# Patient Record
Sex: FEMALE | Race: WHITE | NOT HISPANIC OR LATINO | Employment: UNEMPLOYED | URBAN - METROPOLITAN AREA
[De-identification: names, ages, dates, MRNs, and addresses within clinical notes are randomized per-mention and may not be internally consistent; named-entity substitution may affect disease eponyms.]

---

## 2023-03-09 LAB
ALANINE AMINOTRANSFERASE (SGPT) (U/L) IN SER/PLAS: 6 U/L (ref 7–45)
ALBUMIN (G/DL) IN SER/PLAS: 3.2 G/DL (ref 3.4–5)
ALKALINE PHOSPHATASE (U/L) IN SER/PLAS: 89 U/L (ref 33–110)
AMYLASE (U/L) IN SER/PLAS: 34 U/L (ref 29–103)
ASPARTATE AMINOTRANSFERASE (SGOT) (U/L) IN SER/PLAS: 9 U/L (ref 9–39)
BILIRUBIN DIRECT (MG/DL) IN SER/PLAS: 0 MG/DL (ref 0–0.3)
BILIRUBIN TOTAL (MG/DL) IN SER/PLAS: 0.2 MG/DL (ref 0–1.2)
LIPASE (U/L) IN SER/PLAS: 26 U/L (ref 9–82)
PROTEIN TOTAL: 5.3 G/DL (ref 6.4–8.2)

## 2023-04-14 ENCOUNTER — OFFICE VISIT (OUTPATIENT)
Dept: PRIMARY CARE | Facility: CLINIC | Age: 53
End: 2023-04-14
Payer: MEDICAID

## 2023-04-14 VITALS
DIASTOLIC BLOOD PRESSURE: 86 MMHG | BODY MASS INDEX: 33.77 KG/M2 | SYSTOLIC BLOOD PRESSURE: 130 MMHG | OXYGEN SATURATION: 99 % | WEIGHT: 172 LBS | HEIGHT: 60 IN | HEART RATE: 93 BPM

## 2023-04-14 DIAGNOSIS — M54.16 LUMBAR RADICULOPATHY: ICD-10-CM

## 2023-04-14 DIAGNOSIS — Z12.31 ENCOUNTER FOR SCREENING MAMMOGRAM FOR MALIGNANT NEOPLASM OF BREAST: Primary | ICD-10-CM

## 2023-04-14 DIAGNOSIS — I10 HYPERTENSION, UNSPECIFIED TYPE: ICD-10-CM

## 2023-04-14 DIAGNOSIS — J30.9 ALLERGIC RHINITIS, UNSPECIFIED SEASONALITY, UNSPECIFIED TRIGGER: ICD-10-CM
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
## 2023-04-14 PROBLEM — G43.909 MIGRAINE, UNSPECIFIED, NOT INTRACTABLE, WITHOUT STATUS MIGRAINOSUS: Status: ACTIVE | Noted: 2023-04-14

## 2023-04-14 PROBLEM — M47.816 LUMBAR SPONDYLOSIS: Status: ACTIVE | Noted: 2023-04-14

## 2023-04-14 PROBLEM — R10.811 ABDOMINAL RIGHT UPPER QUADRANT TENDERNESS: Status: ACTIVE | Noted: 2023-04-14

## 2023-04-14 PROBLEM — R29.898 COMPLAINTS OF LEG WEAKNESS: Status: ACTIVE | Noted: 2023-04-14

## 2023-04-14 PROBLEM — M17.11 RIGHT KNEE DJD: Status: ACTIVE | Noted: 2023-04-14
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
## 2023-04-14 PROBLEM — G89.29 CHRONIC BACK PAIN: Status: ACTIVE | Noted: 2023-04-14

## 2023-04-14 PROBLEM — J30.2 SEASONAL ALLERGIC RHINITIS: Status: ACTIVE | Noted: 2023-04-14

## 2023-04-14 PROBLEM — L03.90 CELLULITIS: Status: ACTIVE | Noted: 2023-04-14

## 2023-04-14 PROBLEM — M17.12 LEFT KNEE DJD: Status: ACTIVE | Noted: 2023-04-14

## 2023-04-14 PROBLEM — F41.9 ANXIETY: Status: ACTIVE | Noted: 2023-04-14

## 2023-04-14 PROBLEM — R11.0 NAUSEA: Status: ACTIVE | Noted: 2023-04-14

## 2023-04-14 PROBLEM — G89.4 CHRONIC PAIN SYNDROME: Status: ACTIVE | Noted: 2023-04-14

## 2023-04-14 PROBLEM — E87.6 DIURETIC-INDUCED HYPOKALEMIA: Status: ACTIVE | Noted: 2023-04-14

## 2023-04-14 PROBLEM — R74.8 ELEVATED ALKALINE PHOSPHATASE LEVEL: Status: ACTIVE | Noted: 2023-04-14

## 2023-04-14 PROBLEM — G12.29: Status: ACTIVE | Noted: 2023-04-14

## 2023-04-14 PROBLEM — R60.0 LEG EDEMA: Status: ACTIVE | Noted: 2023-04-14

## 2023-04-14 PROBLEM — D68.61 ANTI-PHOSPHOLIPID SYNDROME (MULTI): Status: ACTIVE | Noted: 2023-04-14

## 2023-04-14 PROBLEM — M47.814 THORACIC SPONDYLOSIS: Status: ACTIVE | Noted: 2023-04-14

## 2023-04-14 PROBLEM — D50.9 MICROCYTIC ANEMIA: Status: ACTIVE | Noted: 2023-04-14

## 2023-04-14 PROBLEM — D64.9 ANEMIA: Status: ACTIVE | Noted: 2023-04-14

## 2023-04-14 PROBLEM — R10.10 PAIN OF UPPER ABDOMEN: Status: ACTIVE | Noted: 2023-04-14

## 2023-04-14 PROBLEM — R93.89 ABNORMAL MRI: Status: ACTIVE | Noted: 2023-04-14

## 2023-04-14 PROBLEM — I83.893 VARICOSE VEINS OF BILATERAL LOWER EXTREMITIES WITH OTHER COMPLICATIONS: Status: ACTIVE | Noted: 2023-04-14

## 2023-04-14 PROBLEM — S91.051A DOG BITE OF RIGHT ANKLE: Status: ACTIVE | Noted: 2023-04-14

## 2023-04-14 PROBLEM — G44.209 HEADACHE, TENSION-TYPE: Status: ACTIVE | Noted: 2023-04-14

## 2023-04-14 PROBLEM — M21.372 BILATERAL FOOT-DROP: Status: ACTIVE | Noted: 2023-04-14

## 2023-04-14 PROBLEM — N39.0 URINARY TRACT INFECTION: Status: ACTIVE | Noted: 2023-04-14

## 2023-04-14 PROBLEM — R16.0 HEPATOMEGALY: Status: ACTIVE | Noted: 2023-04-14

## 2023-04-14 PROBLEM — S60.00XA CONTUSION OF FINGER, LEFT: Status: ACTIVE | Noted: 2023-04-14

## 2023-04-14 PROBLEM — M54.9 CHRONIC BACK PAIN: Status: ACTIVE | Noted: 2023-04-14

## 2023-04-14 PROBLEM — E66.9 OBESITY: Status: ACTIVE | Noted: 2023-04-14

## 2023-04-14 PROBLEM — R30.0 DYSURIA: Status: ACTIVE | Noted: 2023-04-14

## 2023-04-14 PROBLEM — M21.371 BILATERAL FOOT-DROP: Status: ACTIVE | Noted: 2023-04-14

## 2023-04-14 PROBLEM — E78.5 HYPERLIPIDEMIA: Status: ACTIVE | Noted: 2023-04-14

## 2023-04-14 PROBLEM — R05.9 COUGH: Status: ACTIVE | Noted: 2023-04-14

## 2023-04-14 PROBLEM — K29.70 GASTRITIS: Status: ACTIVE | Noted: 2023-04-14

## 2023-04-14 PROBLEM — G44.221 CHRONIC TENSION-TYPE HEADACHE, INTRACTABLE: Status: ACTIVE | Noted: 2023-04-14

## 2023-04-14 PROBLEM — R40.0 DAYTIME SOMNOLENCE: Status: ACTIVE | Noted: 2023-04-14

## 2023-04-14 PROBLEM — D75.839 THROMBOCYTOSIS: Status: ACTIVE | Noted: 2023-04-14

## 2023-04-14 PROBLEM — M79.18 MYOFASCIAL PAIN: Status: ACTIVE | Noted: 2023-04-14

## 2023-04-14 PROBLEM — E55.9 VITAMIN D DEFICIENCY: Status: ACTIVE | Noted: 2023-04-14

## 2023-04-14 PROBLEM — L08.9 INFECTED DOG BITE: Status: ACTIVE | Noted: 2023-04-14

## 2023-04-14 PROBLEM — M79.89 SWELLING OF BOTH LOWER EXTREMITIES: Status: ACTIVE | Noted: 2023-04-14

## 2023-04-14 PROBLEM — E79.0 ELEVATED URIC ACID IN BLOOD: Status: ACTIVE | Noted: 2023-04-14

## 2023-04-14 PROBLEM — M25.562 LEFT KNEE PAIN: Status: ACTIVE | Noted: 2023-04-14

## 2023-04-14 PROBLEM — K76.0 FATTY LIVER: Status: ACTIVE | Noted: 2023-04-14

## 2023-04-14 PROBLEM — K85.90 PANCREATITIS, ACUTE (HHS-HCC): Status: ACTIVE | Noted: 2023-04-14

## 2023-04-14 PROBLEM — N64.89 BREAST ASYMMETRY: Status: ACTIVE | Noted: 2023-04-14

## 2023-04-14 PROBLEM — R80.9 PROTEINURIA: Status: ACTIVE | Noted: 2023-04-14

## 2023-04-14 PROBLEM — E03.9 HYPOTHYROIDISM: Status: ACTIVE | Noted: 2023-04-14

## 2023-04-14 PROBLEM — R19.7 DIARRHEA: Status: ACTIVE | Noted: 2023-04-14

## 2023-04-14 PROBLEM — G62.9 NEUROPATHY: Status: ACTIVE | Noted: 2023-04-14

## 2023-04-14 PROBLEM — I82.90 THROMBOSIS: Status: ACTIVE | Noted: 2023-04-14

## 2023-04-14 PROBLEM — T14.8XXA HEMATOMA: Status: ACTIVE | Noted: 2023-04-14

## 2023-04-14 PROBLEM — S01.81XA FACIAL LACERATION: Status: ACTIVE | Noted: 2023-04-14

## 2023-04-14 PROBLEM — M25.562 BILATERAL KNEE PAIN: Status: ACTIVE | Noted: 2023-04-14

## 2023-04-14 PROBLEM — D80.1 HYPOGAMMAGLOBULINEMIA (MULTI): Status: ACTIVE | Noted: 2023-04-14

## 2023-04-14 PROBLEM — N05.2 MEMBRANOUS GLOMERULONEPHRITIS: Status: ACTIVE | Noted: 2023-04-14

## 2023-04-14 PROBLEM — M25.561 KNEE PAIN, RIGHT: Status: ACTIVE | Noted: 2023-04-14

## 2023-04-14 PROBLEM — M25.561 BILATERAL KNEE PAIN: Status: ACTIVE | Noted: 2023-04-14

## 2023-04-14 PROBLEM — K21.9 GERD (GASTROESOPHAGEAL REFLUX DISEASE): Status: ACTIVE | Noted: 2023-04-14

## 2023-04-14 PROBLEM — T50.2X5A DIURETIC-INDUCED HYPOKALEMIA: Status: ACTIVE | Noted: 2023-04-14

## 2023-04-14 PROBLEM — R73.09 INCREASED GLUCOSE LEVEL WITHOUT DIABETES MELLITUS: Status: ACTIVE | Noted: 2023-04-14

## 2023-04-14 PROBLEM — N39.0 ACUTE UTI: Status: ACTIVE | Noted: 2023-04-14

## 2023-04-14 PROBLEM — R23.3 EASY BRUISING: Status: ACTIVE | Noted: 2023-04-14

## 2023-04-14 PROBLEM — I89.0 LYMPHEDEMA: Status: ACTIVE | Noted: 2023-04-14

## 2023-04-14 PROBLEM — J45.909 ASTHMA (HHS-HCC): Status: ACTIVE | Noted: 2023-04-14

## 2023-04-14 PROBLEM — R68.81 EARLY SATIETY: Status: ACTIVE | Noted: 2023-04-14

## 2023-04-14 PROCEDURE — 3079F DIAST BP 80-89 MM HG: CPT | Performed by: INTERNAL MEDICINE

## 2023-04-14 PROCEDURE — 3075F SYST BP GE 130 - 139MM HG: CPT | Performed by: INTERNAL MEDICINE

## 2023-04-14 PROCEDURE — 99214 OFFICE O/P EST MOD 30 MIN: CPT | Performed by: INTERNAL MEDICINE

## 2023-04-14 PROCEDURE — 1036F TOBACCO NON-USER: CPT | Performed by: INTERNAL MEDICINE

## 2023-04-14 RX ORDER — POTASSIUM CHLORIDE 20 MEQ/1
TABLET, EXTENDED RELEASE ORAL
COMMUNITY
End: 2023-05-01

## 2023-04-14 RX ORDER — ALBUTEROL SULFATE 90 UG/1
AEROSOL, METERED RESPIRATORY (INHALATION) EVERY 6 HOURS
COMMUNITY
End: 2023-08-17 | Stop reason: SDUPTHER

## 2023-04-14 RX ORDER — IMMUNE GLOBULIN INFUSION (HUMAN) 100 MG/ML
INJECTION, SOLUTION INTRAVENOUS; SUBCUTANEOUS
COMMUNITY
Start: 2022-12-30 | End: 2023-10-18 | Stop reason: ENTERED-IN-ERROR

## 2023-04-14 RX ORDER — TOPIRAMATE 100 MG/1
100 TABLET, FILM COATED ORAL 2 TIMES DAILY
COMMUNITY
End: 2023-06-15 | Stop reason: SDUPTHER

## 2023-04-14 RX ORDER — CHOLECALCIFEROL (VITAMIN D3) 25 MCG
1 TABLET ORAL DAILY
COMMUNITY
Start: 2021-04-19 | End: 2023-12-15 | Stop reason: SDUPTHER

## 2023-04-14 RX ORDER — HYDROCHLOROTHIAZIDE 25 MG/1
1 TABLET ORAL DAILY
COMMUNITY
Start: 2021-05-14

## 2023-04-14 RX ORDER — FLUTICASONE PROPIONATE 50 MCG
1 SPRAY, SUSPENSION (ML) NASAL DAILY
Qty: 16 G | Refills: 5 | Status: SHIPPED | OUTPATIENT
Start: 2023-04-14 | End: 2024-04-13

## 2023-04-14 RX ORDER — BUDESONIDE AND FORMOTEROL FUMARATE DIHYDRATE 160; 4.5 UG/1; UG/1
2 AEROSOL RESPIRATORY (INHALATION) 2 TIMES DAILY
COMMUNITY
Start: 2018-10-26 | End: 2023-05-01

## 2023-04-14 RX ORDER — SIMVASTATIN 40 MG/1
TABLET, FILM COATED ORAL
COMMUNITY
End: 2023-10-19 | Stop reason: ALTCHOICE

## 2023-04-14 RX ORDER — VITAMIN E MIXED 400 UNIT
CAPSULE ORAL
COMMUNITY
Start: 2023-01-27 | End: 2023-06-15 | Stop reason: SDUPTHER

## 2023-04-14 RX ORDER — ONDANSETRON 4 MG/1
TABLET, ORALLY DISINTEGRATING ORAL
COMMUNITY
Start: 2021-07-16

## 2023-04-14 RX ORDER — TOPIRAMATE 25 MG/1
TABLET ORAL
COMMUNITY
Start: 2016-02-26 | End: 2023-06-15 | Stop reason: SDUPTHER

## 2023-04-14 RX ORDER — FOLIC ACID 1 MG/1
1 TABLET ORAL DAILY
COMMUNITY
Start: 2021-11-26

## 2023-04-14 RX ORDER — METFORMIN HYDROCHLORIDE 500 MG/1
TABLET ORAL
COMMUNITY
Start: 2022-10-28 | End: 2023-08-17 | Stop reason: SDUPTHER

## 2023-04-14 RX ORDER — LEVOTHYROXINE SODIUM 25 UG/1
25 TABLET ORAL DAILY
COMMUNITY

## 2023-04-14 RX ORDER — DARBEPOETIN ALFA 10 UG/.4ML
10 INJECTION, SOLUTION INTRAVENOUS; SUBCUTANEOUS ONCE
COMMUNITY

## 2023-04-14 RX ORDER — MELATONIN 3 MG
CAPSULE ORAL
COMMUNITY
Start: 2021-11-11

## 2023-04-14 RX ORDER — GABAPENTIN 100 MG/1
100 CAPSULE ORAL NIGHTLY
COMMUNITY
End: 2023-05-01

## 2023-04-14 RX ORDER — WARFARIN SODIUM 2.5 MG/1
5 TABLET ORAL NIGHTLY
COMMUNITY
Start: 2021-11-11

## 2023-04-14 RX ORDER — LISINOPRIL 10 MG/1
10 TABLET ORAL DAILY
COMMUNITY
End: 2023-06-15 | Stop reason: SDUPTHER

## 2023-04-14 RX ORDER — HYDROCODONE BITARTRATE AND ACETAMINOPHEN 5; 325 MG/1; MG/1
TABLET ORAL
COMMUNITY
Start: 2022-04-28 | End: 2023-04-14 | Stop reason: SDUPTHER

## 2023-04-14 RX ORDER — PANTOPRAZOLE SODIUM 40 MG/1
TABLET, DELAYED RELEASE ORAL
COMMUNITY
Start: 2021-11-23

## 2023-04-14 RX ORDER — FUROSEMIDE 40 MG/1
1 TABLET ORAL DAILY
COMMUNITY
Start: 2021-08-30 | End: 2023-06-15 | Stop reason: SDUPTHER

## 2023-04-14 RX ORDER — FERROUS SULFATE 325(65) MG
1 TABLET ORAL DAILY
COMMUNITY
Start: 2016-04-28

## 2023-04-14 RX ORDER — AMITRIPTYLINE HYDROCHLORIDE 50 MG/1
TABLET, FILM COATED ORAL
COMMUNITY
Start: 2014-09-25 | End: 2023-06-15 | Stop reason: SDUPTHER

## 2023-04-14 RX ORDER — HYDROCODONE BITARTRATE AND ACETAMINOPHEN 5; 325 MG/1; MG/1
1 TABLET ORAL EVERY EVENING
Qty: 60 TABLET | Refills: 0 | Status: SHIPPED | OUTPATIENT
Start: 2023-04-14 | End: 2023-06-15 | Stop reason: SDUPTHER

## 2023-04-14 RX ORDER — PRAVASTATIN SODIUM 20 MG/1
1 TABLET ORAL NIGHTLY
COMMUNITY
Start: 2022-06-07 | End: 2023-06-15 | Stop reason: SDUPTHER

## 2023-04-14 RX ORDER — ALLOPURINOL 100 MG/1
1 TABLET ORAL DAILY
COMMUNITY
Start: 2020-01-15 | End: 2023-05-22

## 2023-04-14 NOTE — PROGRESS NOTES
Subjective   Patient ID: Janene Pinedo is a 53 y.o. female who presents for Follow-up and Med Refill.  HPIChief complaint       Glucose ranging 1 29-1 30 overall feels well no issues 6 pounds of intentional weight loss    Vitals:    04/14/23 1306   BP: 130/86   Pulse: 93   SpO2: 99%                 Health Maintenance:      Colonoscopy:      Mammogram:      Pelvic/Pap:      Low dose chest CT:      Aorta duplex:      Optho:      Podiatry:        Vaccines:      Prevnar 20:      Prevnar 13:      Pneumovax 23:      Tdap:      Shingrix:      COVID:      Influenza:        ROS:      General: 6 pounds intentional weight loss denies fever/chills/weight loss      Head: denies HA/trauma/masses/dizziness      Eyes: denies vision change/loss of vision/blurry vision/diplopia/eye pain      Ears: denies hearing loss/tinnitus/otalgia/otorrhea      Nose: denies nasal drainage/anosmia      Throat: denies dysphagia/odynophagia      Lymphatics: denies lymph node swelling      Cardiac: denies CP/palpitations/orthopnea/PND      Pulmonary: denies dyspnea/cough/wheezing      GI: denies abd pain/n/v/diarrhea/melena/hematochezia/hematemesis      : denies dysuria/hematuria/change frequency      Genital: denies genital discharge/lesions      Skin: denies rashes/lesions/masses      MSK: denies weakness/swelling/edema/gait imbalance/pain      Neuro: denies paresthesias/seizures/dysarthria      Psych: denies depression/anxiety/suicidal or homicidal ideations            Objective   Pulse 93   Ht 1.524 m (5')   Wt 78 kg (172 lb)   SpO2 99%   BMI 33.59 kg/m²      Physical Exam:     General: AO3, NAD     Head: atraumatic/NC     Eyes: EOMI/PERRLA. Negative APD     Ears: TM pearly gray, EAC clear. No lesions or erythema     Nose: symmetric nares, no discharge     Throat: trachea midline, uvula midline pink mucosa. No thyromegaly     Lymphatics: no cervical/supraclavicular/ant or posterior cervical adenopathy/axillary/inguinal adenopathy      Breast: not examined     Chest: no deformity or tenderness to palpation     Pulm: CTA b/l, no wheeze/rhonchi/rales. nonlabored     Cardiac: RRR +s1s2, no m/r/g.      GI: soft, NT/ND. Normoactive Bsx4. No rebound/guarding.     Rectal: no examined     MSK: 5/5 strength UE LE. No edema/clubbing/cyanosis     Skin: no rashes/lesions     Vascular: 2+ palp DP PT radials b/l. Negative carotid bruit     Neuro: CNII-XII intact. No focal deficits. Reflexes 2/4 brachioradialis bicep tricep patellar achilles. Finger to nose intact.     Psych: appropriate mood/affect                    No results found for: BMPR1A, CBCDIF      Assessment/Plan   Diagnoses and all orders for this visit:  Encounter for screening mammogram for malignant neoplasm of breast  -     BI mammo bilateral screening tomosynthesis; Future  Lumbar radiculopathy  -     HYDROcodone-acetaminophen (Norco) 5-325 mg tablet; Take 1 tablet by mouth once daily in the evening.  Allergic rhinitis, unspecified seasonality, unspecified trigger  -     fluticasone (Flonase) 50 mcg/actuation nasal spray; Administer 1 spray into each nostril once daily. Shake gently. Before first use, prime pump. After use, clean tip and replace cap.  Hypertension, unspecified type    Screening blood work due October 2023    Thank you for making appointment today Janene    Please follow-up in 2 months       Abigail Valle MA

## 2023-04-18 ENCOUNTER — TELEPHONE (OUTPATIENT)
Dept: PRIMARY CARE | Facility: CLINIC | Age: 53
End: 2023-04-18
Payer: MEDICAID

## 2023-04-29 DIAGNOSIS — E87.6 HYPOKALEMIA: Primary | ICD-10-CM

## 2023-04-29 DIAGNOSIS — G62.9 NEUROPATHY: ICD-10-CM

## 2023-04-29 DIAGNOSIS — J98.01 BRONCHOSPASM: ICD-10-CM

## 2023-05-01 RX ORDER — GABAPENTIN 100 MG/1
CAPSULE ORAL
Qty: 30 CAPSULE | Refills: 0 | Status: SHIPPED | OUTPATIENT
Start: 2023-05-01 | End: 2023-05-30

## 2023-05-01 RX ORDER — POTASSIUM CHLORIDE 20 MEQ/1
TABLET, EXTENDED RELEASE ORAL
Qty: 60 TABLET | Refills: 0 | Status: SHIPPED | OUTPATIENT
Start: 2023-05-01 | End: 2023-06-15 | Stop reason: SDUPTHER

## 2023-05-01 RX ORDER — BUDESONIDE AND FORMOTEROL FUMARATE DIHYDRATE 160; 4.5 UG/1; UG/1
AEROSOL RESPIRATORY (INHALATION)
Qty: 10.2 G | Refills: 0 | Status: SHIPPED | OUTPATIENT
Start: 2023-05-01 | End: 2023-05-22

## 2023-05-05 LAB
ALBUMIN (G/DL) IN SER/PLAS: 3.1 G/DL (ref 3.4–5)
ALBUMIN (MG/L) IN URINE: >2250 MG/L
ALBUMIN/CREATININE (UG/MG) IN URINE: NORMAL UG/MG CRT (ref 0–30)
ANION GAP IN SER/PLAS: 12 MMOL/L (ref 10–20)
APPEARANCE, URINE: CLEAR
BACTERIA, URINE: ABNORMAL /HPF
BILIRUBIN, URINE: NEGATIVE
BLOOD, URINE: NEGATIVE
CALCIDIOL (25 OH VITAMIN D3) (NG/ML) IN SER/PLAS: 21 NG/ML
CALCIUM (MG/DL) IN SER/PLAS: 8.7 MG/DL (ref 8.6–10.3)
CARBON DIOXIDE, TOTAL (MMOL/L) IN SER/PLAS: 18 MMOL/L (ref 21–32)
CHLORIDE (MMOL/L) IN SER/PLAS: 116 MMOL/L (ref 98–107)
COLOR, URINE: ABNORMAL
CREATININE (MG/DL) IN SER/PLAS: 2.96 MG/DL (ref 0.5–1.05)
CREATININE (MG/DL) IN URINE: 38.8 MG/DL (ref 20–320)
CREATININE (MG/DL) IN URINE: 38.8 MG/DL (ref 20–320)
ERYTHROCYTE DISTRIBUTION WIDTH (RATIO) BY AUTOMATED COUNT: 14.5 % (ref 11.5–14.5)
ERYTHROCYTE MEAN CORPUSCULAR HEMOGLOBIN CONCENTRATION (G/DL) BY AUTOMATED: 29.3 G/DL (ref 32–36)
ERYTHROCYTE MEAN CORPUSCULAR VOLUME (FL) BY AUTOMATED COUNT: 97 FL (ref 80–100)
ERYTHROCYTES (10*6/UL) IN BLOOD BY AUTOMATED COUNT: 3.45 X10E12/L (ref 4–5.2)
GFR FEMALE: 18 ML/MIN/1.73M2
GLUCOSE (MG/DL) IN SER/PLAS: 135 MG/DL (ref 74–99)
GLUCOSE, URINE: ABNORMAL MG/DL
HEMATOCRIT (%) IN BLOOD BY AUTOMATED COUNT: 33.4 % (ref 36–46)
HEMOGLOBIN (G/DL) IN BLOOD: 9.8 G/DL (ref 12–16)
IGA (MG/DL) IN SER/PLAS: 112 MG/DL (ref 70–400)
IGG (MG/DL) IN SER/PLAS: 331 MG/DL (ref 700–1600)
IGM (MG/DL) IN SER/PLAS: 53 MG/DL (ref 40–230)
KETONES, URINE: NEGATIVE MG/DL
LEUKOCYTE ESTERASE, URINE: NEGATIVE
LEUKOCYTES (10*3/UL) IN BLOOD BY AUTOMATED COUNT: 8.1 X10E9/L (ref 4.4–11.3)
NITRITE, URINE: NEGATIVE
NRBC (PER 100 WBCS) BY AUTOMATED COUNT: 0 /100 WBC (ref 0–0)
PARATHYRIN INTACT (PG/ML) IN SER/PLAS: 18.6 PG/ML (ref 18.5–88)
PH, URINE: 6 (ref 5–8)
PHOSPHATE (MG/DL) IN SER/PLAS: 5.3 MG/DL (ref 2.5–4.9)
PLATELETS (10*3/UL) IN BLOOD AUTOMATED COUNT: 181 X10E9/L (ref 150–450)
POTASSIUM (MMOL/L) IN SER/PLAS: 4.5 MMOL/L (ref 3.5–5.3)
PROTEIN (MG/DL) IN URINE: 478 MG/DL (ref 5–24)
PROTEIN, URINE: ABNORMAL MG/DL
PROTEIN/CREATININE (MG/MG) IN URINE: 12.32 MG/MG CREAT (ref 0–0.17)
RBC, URINE: 2 /HPF (ref 0–5)
SODIUM (MMOL/L) IN SER/PLAS: 141 MMOL/L (ref 136–145)
SPECIFIC GRAVITY, URINE: 1.01 (ref 1–1.03)
SQUAMOUS EPITHELIAL CELLS, URINE: 2 /HPF
TRANSITIONAL EPITHELIAL CELLS, URINE: <1 /HPF
UREA NITROGEN (MG/DL) IN SER/PLAS: 36 MG/DL (ref 6–23)
UROBILINOGEN, URINE: <2 MG/DL (ref 0–1.9)
WBC, URINE: 6 /HPF (ref 0–5)

## 2023-05-09 LAB
PHOSPHOLIPASE A2 RECEPTOR,IGG TITER: ABNORMAL
PHOSPHOLIPASE A2 RECEPTOR,IGG: DETECTED

## 2023-05-13 DIAGNOSIS — J98.01 BRONCHOSPASM: ICD-10-CM

## 2023-05-13 DIAGNOSIS — E79.0 ELEVATED URIC ACID IN BLOOD: Primary | ICD-10-CM

## 2023-05-22 RX ORDER — ALLOPURINOL 100 MG/1
TABLET ORAL
Qty: 30 TABLET | Refills: 0 | Status: SHIPPED | OUTPATIENT
Start: 2023-05-22 | End: 2023-05-30

## 2023-05-22 RX ORDER — BUDESONIDE AND FORMOTEROL FUMARATE DIHYDRATE 160; 4.5 UG/1; UG/1
AEROSOL RESPIRATORY (INHALATION)
Qty: 10.2 G | Refills: 0 | Status: SHIPPED | OUTPATIENT
Start: 2023-05-22 | End: 2023-05-30

## 2023-05-27 DIAGNOSIS — E79.0 ELEVATED URIC ACID IN BLOOD: ICD-10-CM

## 2023-05-27 DIAGNOSIS — J98.01 BRONCHOSPASM: ICD-10-CM

## 2023-05-27 DIAGNOSIS — G62.9 NEUROPATHY: ICD-10-CM

## 2023-05-30 RX ORDER — GABAPENTIN 100 MG/1
CAPSULE ORAL
Qty: 30 CAPSULE | Refills: 0 | Status: SHIPPED | OUTPATIENT
Start: 2023-05-30 | End: 2023-06-15 | Stop reason: SDUPTHER

## 2023-05-30 RX ORDER — BUDESONIDE AND FORMOTEROL FUMARATE DIHYDRATE 160; 4.5 UG/1; UG/1
AEROSOL RESPIRATORY (INHALATION)
Qty: 10.2 G | Refills: 0 | Status: SHIPPED | OUTPATIENT
Start: 2023-05-30 | End: 2023-07-03

## 2023-05-30 RX ORDER — ALLOPURINOL 100 MG/1
TABLET ORAL
Qty: 30 TABLET | Refills: 0 | Status: SHIPPED | OUTPATIENT
Start: 2023-05-30 | End: 2023-07-03

## 2023-06-14 PROBLEM — M25.569 CHRONIC KNEE PAIN: Status: ACTIVE | Noted: 2023-04-14

## 2023-06-14 PROBLEM — N13.30 BILATERAL HYDRONEPHROSIS: Status: ACTIVE | Noted: 2023-06-14

## 2023-06-14 PROBLEM — N18.4 STAGE 4 CHRONIC KIDNEY DISEASE (MULTI): Status: ACTIVE | Noted: 2023-06-14

## 2023-06-14 PROBLEM — G89.29 CHRONIC KNEE PAIN: Status: ACTIVE | Noted: 2023-04-14

## 2023-06-14 PROBLEM — K68.3 RETROPERITONEAL HEMATOMA: Status: ACTIVE | Noted: 2023-06-14

## 2023-06-14 PROBLEM — N26.1 RENAL ATROPHY, LEFT: Status: ACTIVE | Noted: 2023-06-14

## 2023-06-14 RX ORDER — SODIUM BICARBONATE 650 MG/1
1 TABLET ORAL 2 TIMES DAILY
COMMUNITY
Start: 2023-05-17 | End: 2023-10-18 | Stop reason: SDUPTHER

## 2023-06-15 ENCOUNTER — OFFICE VISIT (OUTPATIENT)
Dept: PRIMARY CARE | Facility: CLINIC | Age: 53
End: 2023-06-15
Payer: MEDICAID

## 2023-06-15 VITALS
HEIGHT: 60 IN | SYSTOLIC BLOOD PRESSURE: 136 MMHG | DIASTOLIC BLOOD PRESSURE: 90 MMHG | HEART RATE: 85 BPM | BODY MASS INDEX: 34.75 KG/M2 | WEIGHT: 177 LBS | OXYGEN SATURATION: 95 %

## 2023-06-15 DIAGNOSIS — Z00.00 HEALTHCARE MAINTENANCE: ICD-10-CM

## 2023-06-15 DIAGNOSIS — R60.0 LEG EDEMA: ICD-10-CM

## 2023-06-15 DIAGNOSIS — G89.29 CHRONIC PAIN OF LEFT KNEE: ICD-10-CM

## 2023-06-15 DIAGNOSIS — G44.229 CHRONIC TENSION-TYPE HEADACHE, NOT INTRACTABLE: ICD-10-CM

## 2023-06-15 DIAGNOSIS — M54.16 LUMBAR RADICULOPATHY: ICD-10-CM

## 2023-06-15 DIAGNOSIS — E87.6 HYPOKALEMIA: ICD-10-CM

## 2023-06-15 DIAGNOSIS — M25.562 CHRONIC PAIN OF LEFT KNEE: ICD-10-CM

## 2023-06-15 DIAGNOSIS — I10 HYPERTENSION, UNSPECIFIED TYPE: ICD-10-CM

## 2023-06-15 DIAGNOSIS — G62.9 NEUROPATHY: ICD-10-CM

## 2023-06-15 DIAGNOSIS — N18.5 CKD (CHRONIC KIDNEY DISEASE), STAGE V (MULTI): Primary | ICD-10-CM

## 2023-06-15 DIAGNOSIS — E78.5 HYPERLIPIDEMIA, UNSPECIFIED HYPERLIPIDEMIA TYPE: ICD-10-CM

## 2023-06-15 PROCEDURE — 99214 OFFICE O/P EST MOD 30 MIN: CPT | Performed by: INTERNAL MEDICINE

## 2023-06-15 PROCEDURE — 3075F SYST BP GE 130 - 139MM HG: CPT | Performed by: INTERNAL MEDICINE

## 2023-06-15 PROCEDURE — 3080F DIAST BP >= 90 MM HG: CPT | Performed by: INTERNAL MEDICINE

## 2023-06-15 PROCEDURE — 1036F TOBACCO NON-USER: CPT | Performed by: INTERNAL MEDICINE

## 2023-06-15 RX ORDER — AMITRIPTYLINE HYDROCHLORIDE 50 MG/1
50 TABLET, FILM COATED ORAL NIGHTLY
Qty: 90 TABLET | Refills: 1 | Status: SHIPPED | OUTPATIENT
Start: 2023-06-15

## 2023-06-15 RX ORDER — POTASSIUM CHLORIDE 20 MEQ/1
TABLET, EXTENDED RELEASE ORAL
Qty: 180 TABLET | Refills: 1 | Status: SHIPPED | OUTPATIENT
Start: 2023-06-15

## 2023-06-15 RX ORDER — LISINOPRIL 10 MG/1
10 TABLET ORAL DAILY
Qty: 90 TABLET | Refills: 1 | Status: SHIPPED | OUTPATIENT
Start: 2023-06-15 | End: 2023-10-18 | Stop reason: ENTERED-IN-ERROR

## 2023-06-15 RX ORDER — VITAMIN E MIXED 400 UNIT
CAPSULE ORAL
Qty: 90 CAPSULE | Refills: 1 | Status: SHIPPED | OUTPATIENT
Start: 2023-06-15

## 2023-06-15 RX ORDER — GABAPENTIN 100 MG/1
100 CAPSULE ORAL NIGHTLY
Qty: 90 CAPSULE | Refills: 1 | Status: SHIPPED | OUTPATIENT
Start: 2023-06-15 | End: 2023-08-17 | Stop reason: SDUPTHER

## 2023-06-15 RX ORDER — PRAVASTATIN SODIUM 20 MG/1
20 TABLET ORAL NIGHTLY
Qty: 90 TABLET | Refills: 1 | Status: SHIPPED | OUTPATIENT
Start: 2023-06-15 | End: 2023-12-15 | Stop reason: ALTCHOICE

## 2023-06-15 RX ORDER — HYDROCODONE BITARTRATE AND ACETAMINOPHEN 5; 325 MG/1; MG/1
1 TABLET ORAL EVERY EVENING
Qty: 60 TABLET | Refills: 0 | Status: SHIPPED | OUTPATIENT
Start: 2023-06-15 | End: 2023-08-17 | Stop reason: SDUPTHER

## 2023-06-15 RX ORDER — TOPIRAMATE 25 MG/1
25 TABLET ORAL DAILY
Qty: 90 TABLET | Refills: 1 | Status: SHIPPED | OUTPATIENT
Start: 2023-06-15

## 2023-06-15 RX ORDER — FUROSEMIDE 40 MG/1
40 TABLET ORAL DAILY
Qty: 90 TABLET | Refills: 1 | Status: SHIPPED | OUTPATIENT
Start: 2023-06-15

## 2023-06-15 RX ORDER — TOPIRAMATE 100 MG/1
100 TABLET, FILM COATED ORAL 2 TIMES DAILY
Qty: 180 TABLET | Refills: 1 | Status: SHIPPED | OUTPATIENT
Start: 2023-06-15

## 2023-06-15 NOTE — PROGRESS NOTES
Subjective   Patient ID: Janene Pinedo is a 53 y.o. female who presents for Follow-up.  HPI          Patient presents for follow-up overall doing okay the main issue seems to be she making preparations to go into assisted living facility history due to her advancing CKD now in stage V she went to be by her father and family that live in the facility as well the facility does have in-house dialysis she is following with the kidney doctor next month      Health Maintenance:      Colonoscopy:      Mammogram: 2023      Pelvic/Pap:      Low dose chest CT:      Aorta duplex:      Optho:      Podiatry:        Vaccines:      Prevnar 20:      Prevnar 13:      Pneumovax 23:      Tdap:      Shingrix:      COVID:      Influenza:        ROS:      General: denies fever/chills/weight loss      Head: denies HA/trauma/masses/dizziness      Eyes: denies vision change/loss of vision/blurry vision/diplopia/eye pain      Ears: denies hearing loss/tinnitus/otalgia/otorrhea      Nose: denies nasal drainage/anosmia      Throat: denies dysphagia/odynophagia      Lymphatics: denies lymph node swelling      Cardiac: denies CP/palpitations/orthopnea/PND      Pulmonary: denies dyspnea/cough/wheezing      GI: denies abd pain/n/v/diarrhea/melena/hematochezia/hematemesis      : denies dysuria/hematuria/change frequency      Genital: denies genital discharge/lesions      Skin: denies rashes/lesions/masses      MSK: Had a fall the other week she was not using her walker the left knee gave out which it does recurrently over the years sometimes has achy pain no residual pain from the fall or trauma denies weakness/swelling/edema/gait imbalance/pain      Neuro: denies paresthesias/seizures/dysarthria      Psych: denies depression/anxiety/suicidal or homicidal ideations            Objective   /90   Pulse 85   Ht 1.524 m (5')   Wt 80.3 kg (177 lb)   SpO2 95%   BMI 34.57 kg/m²      Physical Exam:     General: AO3, NAD     Head:  "atraumatic/NC     Eyes: EOMI/PERRLA. Negative APD     Ears: TM pearly gray, EAC clear. No lesions or erythema     Nose: symmetric nares, no discharge     Throat: trachea midline, uvula midline pink mucosa. No thyromegaly     Lymphatics: no cervical/supraclavicular/ant or posterior cervical adenopathy/axillary/inguinal adenopathy     Breast: not examined     Chest: no deformity or tenderness to palpation     Pulm: CTA b/l, no wheeze/rhonchi/rales. nonlabored     Cardiac: RRR +s1s2, no m/r/g.      GI: soft, NT/ND. Normoactive Bsx4. No rebound/guarding.     Rectal: no examined     MSK: antalgic gait with walker5/5 strength UE LE. No edema/clubbing/cyanosis     Skin: Multiple tattoos no rashes/lesions     Vascular: 2+ palp DP PT radials b/l. Negative carotid bruit     Neuro: CNII-XII intact. No focal deficits. Reflexes 2/4 brachioradialis bicep tricep patellar achilles. Finger to nose intact.     Psych: appropriate mood/affect                    No results found for: \"BMPR1A\", \"CBCDIF\"      Assessment/Plan   Diagnoses and all orders for this visit:  CKD (chronic kidney disease), stage V (CMS/[facility])  Lumbar radiculopathy  -     HYDROcodone-acetaminophen (Norco) 5-325 mg tablet; Take 1 tablet by mouth once daily in the evening.  Neuropathy  -     gabapentin (Neurontin) 100 mg capsule; Take 1 capsule (100 mg) by mouth once daily at bedtime.  Hypokalemia  -     potassium chloride CR (Klor-Con M20) 20 mEq ER tablet; TAKE ONE TABLET BY MOUTH TWO TIMES A DAY or as directed  Chronic tension-type headache, not intractable  -     topiramate (Topamax) 100 mg tablet; Take 1 tablet (100 mg) by mouth 2 times a day.  -     topiramate (Topamax) 25 mg tablet; Take 1 tablet (25 mg) by mouth once daily.  -     amitriptyline (Elavil) 50 mg tablet; Take 1 tablet (50 mg) by mouth once daily at bedtime.  Healthcare maintenance  -     vitamin E 180 mg (400 unit) capsule; 1 po every day.  Hyperlipidemia, unspecified hyperlipidemia type  -     " pravastatin (Pravachol) 20 mg tablet; Take 1 tablet (20 mg) by mouth once daily at bedtime.  Hypertension, unspecified type  -     lisinopril 10 mg tablet; Take 1 tablet (10 mg) by mouth once daily.  Leg edema  -     furosemide (Lasix) 40 mg tablet; Take 1 tablet (40 mg) by mouth once daily.  Chronic pain of left knee  -     XR knee left 1-2 views; Future  As we discussed please call follow-up with orthopedics as ordered  Ensure you are safe walking use the walker at all times  Printed out last progress note to assist with your admission to assisted living     Screening blood work due October 2023    Thank you for making a point today Janene    Please follow-up in 2 months    Abigail Valle MA

## 2023-07-01 DIAGNOSIS — J98.01 BRONCHOSPASM: ICD-10-CM

## 2023-07-01 DIAGNOSIS — E79.0 ELEVATED URIC ACID IN BLOOD: ICD-10-CM

## 2023-07-03 RX ORDER — ALLOPURINOL 100 MG/1
TABLET ORAL
Qty: 30 TABLET | Refills: 0 | Status: SHIPPED | OUTPATIENT
Start: 2023-07-03 | End: 2023-08-01

## 2023-07-03 RX ORDER — BUDESONIDE AND FORMOTEROL FUMARATE DIHYDRATE 160; 4.5 UG/1; UG/1
AEROSOL RESPIRATORY (INHALATION)
Qty: 10.2 G | Refills: 0 | Status: SHIPPED | OUTPATIENT
Start: 2023-07-03 | End: 2023-08-01

## 2023-07-20 LAB
ALBUMIN (G/DL) IN SER/PLAS: 3.1 G/DL (ref 3.4–5)
ALBUMIN (MG/L) IN URINE: 1775.3 MG/L
ALBUMIN/CREATININE (UG/MG) IN URINE: 7820.7 UG/MG CRT (ref 0–30)
ANION GAP IN SER/PLAS: 11 MMOL/L (ref 10–20)
APPEARANCE, URINE: CLEAR
BACTERIA, URINE: ABNORMAL /HPF
BILIRUBIN, URINE: NEGATIVE
BLOOD, URINE: NEGATIVE
CALCIUM (MG/DL) IN SER/PLAS: 8.9 MG/DL (ref 8.6–10.3)
CARBON DIOXIDE, TOTAL (MMOL/L) IN SER/PLAS: 21 MMOL/L (ref 21–32)
CHLORIDE (MMOL/L) IN SER/PLAS: 115 MMOL/L (ref 98–107)
COLOR, URINE: ABNORMAL
CREATININE (MG/DL) IN SER/PLAS: 2.79 MG/DL (ref 0.5–1.05)
CREATININE (MG/DL) IN URINE: 22.7 MG/DL (ref 20–320)
CREATININE (MG/DL) IN URINE: 22.7 MG/DL (ref 20–320)
ERYTHROCYTE DISTRIBUTION WIDTH (RATIO) BY AUTOMATED COUNT: 15 % (ref 11.5–14.5)
ERYTHROCYTE MEAN CORPUSCULAR HEMOGLOBIN CONCENTRATION (G/DL) BY AUTOMATED: 30.1 G/DL (ref 32–36)
ERYTHROCYTE MEAN CORPUSCULAR VOLUME (FL) BY AUTOMATED COUNT: 96 FL (ref 80–100)
ERYTHROCYTES (10*6/UL) IN BLOOD BY AUTOMATED COUNT: 3.5 X10E12/L (ref 4–5.2)
GFR FEMALE: 20 ML/MIN/1.73M2
GLUCOSE (MG/DL) IN SER/PLAS: 92 MG/DL (ref 74–99)
GLUCOSE, URINE: ABNORMAL MG/DL
HEMATOCRIT (%) IN BLOOD BY AUTOMATED COUNT: 33.5 % (ref 36–46)
HEMOGLOBIN (G/DL) IN BLOOD: 10.1 G/DL (ref 12–16)
HYALINE CASTS, URINE: ABNORMAL /LPF
KETONES, URINE: NEGATIVE MG/DL
LEUKOCYTE ESTERASE, URINE: ABNORMAL
LEUKOCYTES (10*3/UL) IN BLOOD BY AUTOMATED COUNT: 7.7 X10E9/L (ref 4.4–11.3)
MUCUS, URINE: ABNORMAL /LPF
NITRITE, URINE: NEGATIVE
NRBC (PER 100 WBCS) BY AUTOMATED COUNT: 0 /100 WBC (ref 0–0)
PH, URINE: 7 (ref 5–8)
PHOSPHATE (MG/DL) IN SER/PLAS: 4.3 MG/DL (ref 2.5–4.9)
PLATELETS (10*3/UL) IN BLOOD AUTOMATED COUNT: 172 X10E9/L (ref 150–450)
POCT INTERNATIONAL NORMALIZATION RATIO: 1.5 (ref 0.9–1.1)
POTASSIUM (MMOL/L) IN SER/PLAS: 4.8 MMOL/L (ref 3.5–5.3)
PROTEIN (MG/DL) IN URINE: 318 MG/DL (ref 5–24)
PROTEIN, URINE: ABNORMAL MG/DL
PROTEIN/CREATININE (MG/MG) IN URINE: 14.01 MG/MG CREAT (ref 0–0.17)
RBC, URINE: <1 /HPF (ref 0–5)
SODIUM (MMOL/L) IN SER/PLAS: 142 MMOL/L (ref 136–145)
SPECIFIC GRAVITY, URINE: 1.01 (ref 1–1.03)
SQUAMOUS EPITHELIAL CELLS, URINE: 1 /HPF
TRANSITIONAL EPITHELIAL CELLS, URINE: <1 /HPF
UREA NITROGEN (MG/DL) IN SER/PLAS: 34 MG/DL (ref 6–23)
UROBILINOGEN, URINE: <2 MG/DL (ref 0–1.9)
WBC, URINE: 5 /HPF (ref 0–5)

## 2023-08-01 DIAGNOSIS — J98.01 BRONCHOSPASM: ICD-10-CM

## 2023-08-01 DIAGNOSIS — E79.0 ELEVATED URIC ACID IN BLOOD: ICD-10-CM

## 2023-08-01 RX ORDER — ALLOPURINOL 100 MG/1
TABLET ORAL
Qty: 30 TABLET | Refills: 0 | Status: SHIPPED | OUTPATIENT
Start: 2023-08-01

## 2023-08-01 RX ORDER — BUDESONIDE AND FORMOTEROL FUMARATE DIHYDRATE 160; 4.5 UG/1; UG/1
AEROSOL RESPIRATORY (INHALATION)
Qty: 10.2 G | Refills: 0 | Status: SHIPPED | OUTPATIENT
Start: 2023-08-01

## 2023-08-10 LAB
ALBUMIN (G/DL) IN SER/PLAS: 2.9 G/DL (ref 3.4–5)
ANION GAP IN SER/PLAS: 10 MMOL/L (ref 10–20)
CALCIUM (MG/DL) IN SER/PLAS: 8.4 MG/DL (ref 8.6–10.3)
CARBON DIOXIDE, TOTAL (MMOL/L) IN SER/PLAS: 22 MMOL/L (ref 21–32)
CHLORIDE (MMOL/L) IN SER/PLAS: 112 MMOL/L (ref 98–107)
CREATININE (MG/DL) IN SER/PLAS: 2.54 MG/DL (ref 0.5–1.05)
GFR FEMALE: 22 ML/MIN/1.73M2
GLUCOSE (MG/DL) IN SER/PLAS: 125 MG/DL (ref 74–99)
PHOSPHATE (MG/DL) IN SER/PLAS: 4.7 MG/DL (ref 2.5–4.9)
POTASSIUM (MMOL/L) IN SER/PLAS: 4.2 MMOL/L (ref 3.5–5.3)
SODIUM (MMOL/L) IN SER/PLAS: 140 MMOL/L (ref 136–145)
UREA NITROGEN (MG/DL) IN SER/PLAS: 28 MG/DL (ref 6–23)

## 2023-08-16 RX ORDER — LISINOPRIL 20 MG/1
20 TABLET ORAL DAILY
COMMUNITY
Start: 2023-07-26

## 2023-08-17 ENCOUNTER — LAB (OUTPATIENT)
Dept: LAB | Facility: LAB | Age: 53
End: 2023-08-17
Payer: MEDICAID

## 2023-08-17 ENCOUNTER — TELEPHONE (OUTPATIENT)
Dept: PRIMARY CARE | Facility: CLINIC | Age: 53
End: 2023-08-17

## 2023-08-17 ENCOUNTER — OFFICE VISIT (OUTPATIENT)
Dept: PRIMARY CARE | Facility: CLINIC | Age: 53
End: 2023-08-17
Payer: MEDICAID

## 2023-08-17 VITALS
DIASTOLIC BLOOD PRESSURE: 86 MMHG | HEART RATE: 98 BPM | OXYGEN SATURATION: 97 % | BODY MASS INDEX: 33.18 KG/M2 | WEIGHT: 169 LBS | HEIGHT: 60 IN | SYSTOLIC BLOOD PRESSURE: 126 MMHG

## 2023-08-17 DIAGNOSIS — J98.01 BRONCHOSPASM: Primary | ICD-10-CM

## 2023-08-17 DIAGNOSIS — M54.16 LUMBAR RADICULOPATHY: ICD-10-CM

## 2023-08-17 DIAGNOSIS — M17.12 PRIMARY OSTEOARTHRITIS OF LEFT KNEE: ICD-10-CM

## 2023-08-17 DIAGNOSIS — Z79.899 LONG TERM USE OF DRUG: ICD-10-CM

## 2023-08-17 DIAGNOSIS — G62.9 NEUROPATHY: ICD-10-CM

## 2023-08-17 DIAGNOSIS — E11.9 TYPE 2 DIABETES MELLITUS WITHOUT COMPLICATION, UNSPECIFIED WHETHER LONG TERM INSULIN USE (MULTI): ICD-10-CM

## 2023-08-17 LAB
ESTIMATED AVERAGE GLUCOSE FOR HBA1C: 80 MG/DL
HEMOGLOBIN A1C/HEMOGLOBIN TOTAL IN BLOOD: 4.4 %

## 2023-08-17 PROCEDURE — 99214 OFFICE O/P EST MOD 30 MIN: CPT | Performed by: INTERNAL MEDICINE

## 2023-08-17 PROCEDURE — 80354 DRUG SCREENING FENTANYL: CPT

## 2023-08-17 PROCEDURE — 80365 DRUG SCREENING OXYCODONE: CPT

## 2023-08-17 PROCEDURE — 36415 COLL VENOUS BLD VENIPUNCTURE: CPT

## 2023-08-17 PROCEDURE — 80373 DRUG SCREENING TRAMADOL: CPT

## 2023-08-17 PROCEDURE — 3079F DIAST BP 80-89 MM HG: CPT | Performed by: INTERNAL MEDICINE

## 2023-08-17 PROCEDURE — 83036 HEMOGLOBIN GLYCOSYLATED A1C: CPT

## 2023-08-17 PROCEDURE — 3074F SYST BP LT 130 MM HG: CPT | Performed by: INTERNAL MEDICINE

## 2023-08-17 PROCEDURE — 80346 BENZODIAZEPINES1-12: CPT

## 2023-08-17 PROCEDURE — 80358 DRUG SCREENING METHADONE: CPT

## 2023-08-17 PROCEDURE — 1036F TOBACCO NON-USER: CPT | Performed by: INTERNAL MEDICINE

## 2023-08-17 PROCEDURE — 80361 OPIATES 1 OR MORE: CPT

## 2023-08-17 PROCEDURE — 80368 SEDATIVE HYPNOTICS: CPT

## 2023-08-17 PROCEDURE — 80307 DRUG TEST PRSMV CHEM ANLYZR: CPT

## 2023-08-17 PROCEDURE — 4010F ACE/ARB THERAPY RXD/TAKEN: CPT | Performed by: INTERNAL MEDICINE

## 2023-08-17 RX ORDER — HYDROCODONE BITARTRATE AND ACETAMINOPHEN 5; 325 MG/1; MG/1
1 TABLET ORAL EVERY EVENING
Qty: 30 TABLET | Refills: 0 | Status: SHIPPED | OUTPATIENT
Start: 2023-08-17 | End: 2023-09-16

## 2023-08-17 RX ORDER — METFORMIN HYDROCHLORIDE 500 MG/1
500 TABLET ORAL
Qty: 90 TABLET | Refills: 1 | Status: SHIPPED | OUTPATIENT
Start: 2023-08-17 | End: 2023-10-20 | Stop reason: ALTCHOICE

## 2023-08-17 RX ORDER — GABAPENTIN 100 MG/1
100 CAPSULE ORAL NIGHTLY
Qty: 90 CAPSULE | Refills: 1 | Status: SHIPPED | OUTPATIENT
Start: 2023-08-17

## 2023-08-17 RX ORDER — ALBUTEROL SULFATE 90 UG/1
2 AEROSOL, METERED RESPIRATORY (INHALATION) EVERY 6 HOURS PRN
Qty: 18 G | Refills: 5 | Status: SHIPPED | OUTPATIENT
Start: 2023-08-17

## 2023-08-17 RX ORDER — HYDROCODONE BITARTRATE AND ACETAMINOPHEN 5; 325 MG/1; MG/1
1 TABLET ORAL EVERY EVENING
Qty: 60 TABLET | Refills: 0 | Status: SHIPPED | OUTPATIENT
Start: 2023-08-17 | End: 2023-08-17 | Stop reason: SDUPTHER

## 2023-08-17 NOTE — PROGRESS NOTES
Subjective   Patient ID: Janene Pinedo is a 53 y.o. female who presents for Follow-up and Med Refill.  HPI        Patient follows up she is having chronic gait instability from left knee giving out for months to years grade 6 out of 10 states she had cortisone injection in the past with orthopedic did not really help her stability  Occasional left knee arthralgia with it  Denies joint swelling fever chills paralysis paresthesias        Health Maintenance:      Colonoscopy:      Mammogram: 2023      Pelvic/Pap:      Low dose chest CT:      Aorta duplex:      Optho:      Podiatry:        Vaccines:      Prevnar 20:      Prevnar 13:      Pneumovax 23:      Tdap:      Shingrix:      COVID:      Influenza:        ROS:      General: denies fever/chills/weight loss      Head: denies HA/trauma/masses/dizziness      Eyes: denies vision change/loss of vision/blurry vision/diplopia/eye pain      Ears: denies hearing loss/tinnitus/otalgia/otorrhea      Nose: denies nasal drainage/anosmia      Throat: denies dysphagia/odynophagia      Lymphatics: denies lymph node swelling      Cardiac: denies CP/palpitations/orthopnea/PND      Pulmonary: denies dyspnea/cough/wheezing      GI: denies abd pain/n/v/diarrhea/melena/hematochezia/hematemesis      : denies dysuria/hematuria/change frequency      Genital: denies genital discharge/lesions      Skin: denies rashes/lesions/masses      MSK: Gait instability following left knee giving out ache denies weakness/swelling/edema/gait imbalance/pain      Neuro: denies paresthesias/seizures/dysarthria      Psych: denies depression/anxiety/suicidal or homicidal ideations            Objective   /90   Pulse 98   Ht 1.524 m (5')   Wt 76.7 kg (169 lb)   SpO2 97%   BMI 33.01 kg/m²      Physical Exam:     General: AO3, NAD     Head: atraumatic/NC     Eyes: EOMI/PERRLA. Negative APD     Ears: TM pearly gray, EAC clear. No lesions or erythema     Nose: symmetric nares, no discharge      "Throat: trachea midline, uvula midline pink mucosa. No thyromegaly     Lymphatics: no cervical/supraclavicular/ant or posterior cervical adenopathy/axillary/inguinal adenopathy     Breast: not examined     Chest: no deformity or tenderness to palpation     Pulm: CTA b/l, no wheeze/rhonchi/rales. nonlabored     Cardiac: RRR +s1s2, no m/r/g.      GI: soft, NT/ND. Normoactive Bsx4. No rebound/guarding.     Rectal: no examined     MSK: Crepitance bilateral patella left greater than right unsteady slow gait with walker 5/5 strength UE LE. No edema/clubbing/cyanosis     Skin: no rashes/lesions     Vascular: 2+ palp DP PT radials b/l. Negative carotid bruit     Neuro: CNII-XII intact. No focal deficits. Reflexes 2/4 brachioradialis bicep tricep patellar achilles. Finger to nose intact.     Psych: appropriate mood/affect                    No results found for: \"BMPR1A\", \"CBCDIF\"      Assessment/Plan   Diagnoses and all orders for this visit:  Bronchospasm  -     albuterol 90 mcg/actuation inhaler; Inhale 2 puffs every 6 hours if needed for wheezing.  Neuropathy  -     gabapentin (Neurontin) 100 mg capsule; Take 1 capsule (100 mg) by mouth once daily at bedtime.  -     Hemoglobin A1C; Future  Lumbar radiculopathy  -     HYDROcodone-acetaminophen (Norco) 5-325 mg tablet; Take 1 tablet by mouth once daily in the evening.  Long term use of drug  -     Opiate/Opioid/Benzo Extended Prescription Compliance; Future  Type 2 diabetes mellitus without complication, unspecified whether long term insulin use (CMS/[facility])  -     metFORMIN (Glucophage) 500 mg tablet; Take 1 tablet (500 mg) by mouth once daily with a meal.  Primary osteoarthritis of left knee  Comments:  With secondary gait instability    Letter written for recommendation for [facility] services skilled nursing as he stated he had trouble with ambulation cooking and managing her pills      Recommend completing x-ray of the left knee as ordered and calling and " following up with orthopedics    Follow-up with nephrology as planned    Screening blood work due October 2023    Thank you for making appointment today Janene    Please follow-up in 2 months       Abigail Valle MA

## 2023-08-17 NOTE — LETTER
August 17, 2023     Patient: Janene Pinedo   YOB: 1970   Date of Visit: 8/17/2023       To Whom It May Concern:    Janene Pinedo was seen in my clinic on 8/17/2023 at 11:15 am. I recommend patient for skilled rehab placement at [facility] for medical reasons, chronic back pain leg weakness, CKD IV advancing.    If you have any questions or concerns, please don't hesitate to call.         Sincerely,         Scott Ramos DO        CC: No Recipients

## 2023-08-23 LAB
6-ACETYLMORPHINE: <25 NG/ML
7-AMINOCLONAZEPAM: <25 NG/ML
ALPHA-HYDROXYALPRAZOLAM: <25 NG/ML
ALPHA-HYDROXYMIDAZOLAM: <25 NG/ML
ALPRAZOLAM: <25 NG/ML
AMPHETAMINE (PRESENCE) IN URINE BY SCREEN METHOD: ABNORMAL
BARBITURATES PRESENCE IN URINE BY SCREEN METHOD: ABNORMAL
CANNABINOIDS IN URINE BY SCREEN METHOD: ABNORMAL
CHLORDIAZEPOXIDE: <25 NG/ML
CLONAZEPAM: <25 NG/ML
COCAINE (PRESENCE) IN URINE BY SCREEN METHOD: ABNORMAL
CODEINE: <50 NG/ML
CREATINE, URINE FOR DRUG: 21 MG/DL
DIAZEPAM: <25 NG/ML
DRUG SCREEN COMMENT URINE: ABNORMAL
EDDP: <25 NG/ML
FENTANYL CONFIRMATION, URINE: <2.5 NG/ML
HYDROCODONE: <25 NG/ML
HYDROMORPHONE: 27 NG/ML
LORAZEPAM: <25 NG/ML
METHADONE CONFIRMATION,URINE: <25 NG/ML
MIDAZOLAM: <25 NG/ML
MORPHINE URINE: <50 NG/ML
NORDIAZEPAM: <25 NG/ML
NORFENTANYL: <2.5 NG/ML
NORHYDROCODONE: 42 NG/ML
NOROXYCODONE: <25 NG/ML
O-DESMETHYLTRAMADOL: <50 NG/ML
OXAZEPAM: <25 NG/ML
OXYCODONE: <25 NG/ML
OXYMORPHONE: <25 NG/ML
PHENCYCLIDINE (PRESENCE) IN URINE BY SCREEN METHOD: ABNORMAL
TEMAZEPAM: <25 NG/ML
TRAMADOL: <50 NG/ML
ZOLPIDEM METABOLITE (ZCA): <25 NG/ML
ZOLPIDEM: <25 NG/ML

## 2023-08-31 LAB
ALANINE AMINOTRANSFERASE (SGPT) (U/L) IN SER/PLAS: 7 U/L (ref 7–45)
ALBUMIN (G/DL) IN SER/PLAS: 2.3 G/DL (ref 3.4–5)
ALKALINE PHOSPHATASE (U/L) IN SER/PLAS: 80 U/L (ref 33–110)
ANION GAP IN SER/PLAS: 14 MMOL/L (ref 10–20)
ASPARTATE AMINOTRANSFERASE (SGOT) (U/L) IN SER/PLAS: 13 U/L (ref 9–39)
BASOPHILS (10*3/UL) IN BLOOD BY AUTOMATED COUNT: 0.02 X10E9/L (ref 0–0.1)
BASOPHILS/100 LEUKOCYTES IN BLOOD BY AUTOMATED COUNT: 0.2 % (ref 0–2)
BILIRUBIN TOTAL (MG/DL) IN SER/PLAS: 0.2 MG/DL (ref 0–1.2)
CALCIDIOL (25 OH VITAMIN D3) (NG/ML) IN SER/PLAS: 34 NG/ML
CALCIUM (MG/DL) IN SER/PLAS: 7.5 MG/DL (ref 8.6–10.3)
CARBON DIOXIDE, TOTAL (MMOL/L) IN SER/PLAS: 17 MMOL/L (ref 21–32)
CHLORIDE (MMOL/L) IN SER/PLAS: 115 MMOL/L (ref 98–107)
COBALAMIN (VITAMIN B12) (PG/ML) IN SER/PLAS: 1765 PG/ML (ref 211–911)
CREATININE (MG/DL) IN SER/PLAS: 2.65 MG/DL (ref 0.5–1.05)
EOSINOPHILS (10*3/UL) IN BLOOD BY AUTOMATED COUNT: 0.17 X10E9/L (ref 0–0.7)
EOSINOPHILS/100 LEUKOCYTES IN BLOOD BY AUTOMATED COUNT: 1.8 % (ref 0–6)
ERYTHROCYTE DISTRIBUTION WIDTH (RATIO) BY AUTOMATED COUNT: 15.7 % (ref 11.5–14.5)
ERYTHROCYTE MEAN CORPUSCULAR HEMOGLOBIN CONCENTRATION (G/DL) BY AUTOMATED: 30.7 G/DL (ref 32–36)
ERYTHROCYTE MEAN CORPUSCULAR VOLUME (FL) BY AUTOMATED COUNT: 98 FL (ref 80–100)
ERYTHROCYTES (10*6/UL) IN BLOOD BY AUTOMATED COUNT: 2.96 X10E12/L (ref 4–5.2)
FOLATE (NG/ML) IN SER/PLAS: 22.2 NG/ML
GFR FEMALE: 21 ML/MIN/1.73M2
GLUCOSE (MG/DL) IN SER/PLAS: 99 MG/DL (ref 74–99)
HEMATOCRIT (%) IN BLOOD BY AUTOMATED COUNT: 29 % (ref 36–46)
HEMOGLOBIN (G/DL) IN BLOOD: 8.9 G/DL (ref 12–16)
IMMATURE GRANULOCYTES/100 LEUKOCYTES IN BLOOD BY AUTOMATED COUNT: 1 % (ref 0–0.9)
INR IN PPP BY COAGULATION ASSAY: 4.7 (ref 0.9–1.1)
LEUKOCYTES (10*3/UL) IN BLOOD BY AUTOMATED COUNT: 9.7 X10E9/L (ref 4.4–11.3)
LYMPHOCYTES (10*3/UL) IN BLOOD BY AUTOMATED COUNT: 1.33 X10E9/L (ref 1.2–4.8)
LYMPHOCYTES/100 LEUKOCYTES IN BLOOD BY AUTOMATED COUNT: 13.8 % (ref 13–44)
MAGNESIUM (MG/DL) IN SER/PLAS: 2.01 MG/DL (ref 1.6–2.4)
MONOCYTES (10*3/UL) IN BLOOD BY AUTOMATED COUNT: 1 X10E9/L (ref 0.1–1)
MONOCYTES/100 LEUKOCYTES IN BLOOD BY AUTOMATED COUNT: 10.3 % (ref 2–10)
NEUTROPHILS (10*3/UL) IN BLOOD BY AUTOMATED COUNT: 7.05 X10E9/L (ref 1.2–7.7)
NEUTROPHILS/100 LEUKOCYTES IN BLOOD BY AUTOMATED COUNT: 72.9 % (ref 40–80)
NRBC (PER 100 WBCS) BY AUTOMATED COUNT: 0.2 /100 WBC (ref 0–0)
PLATELETS (10*3/UL) IN BLOOD AUTOMATED COUNT: 198 X10E9/L (ref 150–450)
POTASSIUM (MMOL/L) IN SER/PLAS: 4.1 MMOL/L (ref 3.5–5.3)
PROTEIN TOTAL: 4 G/DL (ref 6.4–8.2)
PROTHROMBIN TIME (PT) IN PPP BY COAGULATION ASSAY: 54.4 SEC (ref 9.8–12.8)
SODIUM (MMOL/L) IN SER/PLAS: 142 MMOL/L (ref 136–145)
THYROTROPIN (MIU/L) IN SER/PLAS BY DETECTION LIMIT <= 0.05 MIU/L: 0.47 MIU/L (ref 0.44–3.98)
THYROXINE (T4) (UG/DL) IN SER/PLAS: 6.8 UG/DL (ref 4.5–11.1)
UREA NITROGEN (MG/DL) IN SER/PLAS: 36 MG/DL (ref 6–23)

## 2023-09-03 LAB
INR IN PPP BY COAGULATION ASSAY: 1.7 (ref 0.9–1.1)
PROTHROMBIN TIME (PT) IN PPP BY COAGULATION ASSAY: 18.9 SEC (ref 9.8–12.8)

## 2023-09-05 LAB
INR IN PPP BY COAGULATION ASSAY: 1.1 (ref 0.9–1.1)
PROTHROMBIN TIME (PT) IN PPP BY COAGULATION ASSAY: 12.2 SEC (ref 9.8–12.8)

## 2023-09-07 LAB
INR IN PPP BY COAGULATION ASSAY: 1 (ref 0.9–1.1)
PROTHROMBIN TIME (PT) IN PPP BY COAGULATION ASSAY: 11.8 SEC (ref 9.8–12.8)

## 2023-09-09 LAB
INR IN PPP BY COAGULATION ASSAY: 1 (ref 0.9–1.1)
PROTHROMBIN TIME (PT) IN PPP BY COAGULATION ASSAY: 11.5 SEC (ref 9.8–12.8)

## 2023-09-11 ENCOUNTER — NURSING HOME VISIT (OUTPATIENT)
Dept: POST ACUTE CARE | Facility: EXTERNAL LOCATION | Age: 53
End: 2023-09-11
Payer: MEDICAID

## 2023-09-11 DIAGNOSIS — M25.569 CHRONIC KNEE PAIN, UNSPECIFIED LATERALITY: ICD-10-CM

## 2023-09-11 DIAGNOSIS — J45.40 MODERATE PERSISTENT ASTHMA, UNSPECIFIED WHETHER COMPLICATED (HHS-HCC): ICD-10-CM

## 2023-09-11 DIAGNOSIS — M21.371 BILATERAL FOOT-DROP: ICD-10-CM

## 2023-09-11 DIAGNOSIS — D68.61 ANTI-PHOSPHOLIPID SYNDROME (MULTI): Primary | ICD-10-CM

## 2023-09-11 DIAGNOSIS — G89.29 CHRONIC KNEE PAIN, UNSPECIFIED LATERALITY: ICD-10-CM

## 2023-09-11 DIAGNOSIS — M21.372 BILATERAL FOOT-DROP: ICD-10-CM

## 2023-09-11 DIAGNOSIS — D50.8 OTHER IRON DEFICIENCY ANEMIA: ICD-10-CM

## 2023-09-11 LAB
ACTIVATED PARTIAL THROMBOPLASTIN TIME IN PPP BY COAGULATION ASSAY: 35 SEC (ref 27–38)
ALANINE AMINOTRANSFERASE (SGPT) (U/L) IN SER/PLAS: 5 U/L (ref 7–45)
ALBUMIN (G/DL) IN SER/PLAS: 2.9 G/DL (ref 3.4–5)
ALKALINE PHOSPHATASE (U/L) IN SER/PLAS: 76 U/L (ref 33–110)
ANION GAP IN SER/PLAS: 10 MMOL/L (ref 10–20)
ASPARTATE AMINOTRANSFERASE (SGOT) (U/L) IN SER/PLAS: 7 U/L (ref 9–39)
BILIRUBIN TOTAL (MG/DL) IN SER/PLAS: 0.2 MG/DL (ref 0–1.2)
CALCIDIOL (25 OH VITAMIN D3) (NG/ML) IN SER/PLAS: 24 NG/ML
CALCIUM (MG/DL) IN SER/PLAS: 8.5 MG/DL (ref 8.6–10.3)
CARBON DIOXIDE, TOTAL (MMOL/L) IN SER/PLAS: 20 MMOL/L (ref 21–32)
CHLORIDE (MMOL/L) IN SER/PLAS: 118 MMOL/L (ref 98–107)
COBALAMIN (VITAMIN B12) (PG/ML) IN SER/PLAS: 461 PG/ML (ref 211–911)
CREATININE (MG/DL) IN SER/PLAS: 2.42 MG/DL (ref 0.5–1.05)
ERYTHROCYTE DISTRIBUTION WIDTH (RATIO) BY AUTOMATED COUNT: 14.7 % (ref 11.5–14.5)
ERYTHROCYTE MEAN CORPUSCULAR HEMOGLOBIN CONCENTRATION (G/DL) BY AUTOMATED: 29.1 G/DL (ref 32–36)
ERYTHROCYTE MEAN CORPUSCULAR VOLUME (FL) BY AUTOMATED COUNT: 98 FL (ref 80–100)
ERYTHROCYTES (10*6/UL) IN BLOOD BY AUTOMATED COUNT: 3.05 X10E12/L (ref 4–5.2)
ESTIMATED AVERAGE GLUCOSE FOR HBA1C: 97 MG/DL
FOLATE (NG/ML) IN SER/PLAS: 12.9 NG/ML
GFR FEMALE: 23 ML/MIN/1.73M2
GLUCOSE (MG/DL) IN SER/PLAS: 99 MG/DL (ref 74–99)
HEMATOCRIT (%) IN BLOOD BY AUTOMATED COUNT: 29.9 % (ref 36–46)
HEMOGLOBIN (G/DL) IN BLOOD: 8.7 G/DL (ref 12–16)
HEMOGLOBIN A1C/HEMOGLOBIN TOTAL IN BLOOD: 5 %
INR IN PPP BY COAGULATION ASSAY: 1 (ref 0.9–1.1)
LEUKOCYTES (10*3/UL) IN BLOOD BY AUTOMATED COUNT: 7.2 X10E9/L (ref 4.4–11.3)
NRBC (PER 100 WBCS) BY AUTOMATED COUNT: 0 /100 WBC (ref 0–0)
PLATELETS (10*3/UL) IN BLOOD AUTOMATED COUNT: 225 X10E9/L (ref 150–450)
POTASSIUM (MMOL/L) IN SER/PLAS: 4.2 MMOL/L (ref 3.5–5.3)
PROTEIN TOTAL: 4.6 G/DL (ref 6.4–8.2)
PROTHROMBIN TIME (PT) IN PPP BY COAGULATION ASSAY: 11.4 SEC (ref 9.8–12.8)
SODIUM (MMOL/L) IN SER/PLAS: 144 MMOL/L (ref 136–145)
THYROTROPIN (MIU/L) IN SER/PLAS BY DETECTION LIMIT <= 0.05 MIU/L: 0.93 MIU/L (ref 0.44–3.98)
UREA NITROGEN (MG/DL) IN SER/PLAS: 37 MG/DL (ref 6–23)

## 2023-09-11 PROCEDURE — 99308 SBSQ NF CARE LOW MDM 20: CPT | Performed by: FAMILY MEDICINE

## 2023-09-11 NOTE — PROGRESS NOTES
Subjective   Patient ID: Janene Pinedo is a 53 y.o. female who is long term resident being seen and evaluated for multiple medical problems.    Chronic knee djd, anti phospholipid syndrome. Foot drop, anemia, asthma.         Review of Systems   Reason unable to perform ROS: debility.       Objective   There were no vitals taken for this visit.    Physical Exam  Vitals and nursing note reviewed.   Constitutional:       Appearance: She is ill-appearing.   HENT:      Head: Normocephalic and atraumatic.      Nose: Nose normal.      Mouth/Throat:      Mouth: Mucous membranes are moist.      Pharynx: Oropharynx is clear.   Eyes:      Extraocular Movements: Extraocular movements intact.      Conjunctiva/sclera: Conjunctivae normal.      Pupils: Pupils are equal, round, and reactive to light.   Cardiovascular:      Rate and Rhythm: Normal rate and regular rhythm.      Pulses: Normal pulses.   Pulmonary:      Effort: Pulmonary effort is normal.      Breath sounds: Normal breath sounds.   Abdominal:      General: Bowel sounds are normal.      Palpations: Abdomen is soft.   Musculoskeletal:         General: Tenderness present. Normal range of motion.      Cervical back: Normal range of motion and neck supple.   Skin:     General: Skin is warm and dry.      Capillary Refill: Capillary refill takes less than 2 seconds.      Coloration: Skin is pale.   Neurological:      Mental Status: She is alert. Mental status is at baseline.      Comments: Bilateral foot drop         Assessment/Plan   Problem List Items Addressed This Visit       Anemia    Anti-phospholipid syndrome (CMS/HCC) - Primary    Asthma    Bilateral foot-drop    Chronic knee pain        Goals    None

## 2023-09-11 NOTE — LETTER
Patient: Janene Pinedo  : 1970    Encounter Date: 2023    Subjective  Patient ID: Janene Pinedo is a 53 y.o. female who is long term resident being seen and evaluated for multiple medical problems.    Chronic knee djd, anti phospholipid syndrome. Foot drop, anemia, asthma.         Review of Systems   Reason unable to perform ROS: debility.       Objective  There were no vitals taken for this visit.    Physical Exam  Vitals and nursing note reviewed.   Constitutional:       Appearance: She is ill-appearing.   HENT:      Head: Normocephalic and atraumatic.      Nose: Nose normal.      Mouth/Throat:      Mouth: Mucous membranes are moist.      Pharynx: Oropharynx is clear.   Eyes:      Extraocular Movements: Extraocular movements intact.      Conjunctiva/sclera: Conjunctivae normal.      Pupils: Pupils are equal, round, and reactive to light.   Cardiovascular:      Rate and Rhythm: Normal rate and regular rhythm.      Pulses: Normal pulses.   Pulmonary:      Effort: Pulmonary effort is normal.      Breath sounds: Normal breath sounds.   Abdominal:      General: Bowel sounds are normal.      Palpations: Abdomen is soft.   Musculoskeletal:         General: Tenderness present. Normal range of motion.      Cervical back: Normal range of motion and neck supple.   Skin:     General: Skin is warm and dry.      Capillary Refill: Capillary refill takes less than 2 seconds.      Coloration: Skin is pale.   Neurological:      Mental Status: She is alert. Mental status is at baseline.      Comments: Bilateral foot drop         Assessment/Plan  Problem List Items Addressed This Visit       Anemia    Anti-phospholipid syndrome (CMS/HCC) - Primary    Asthma    Bilateral foot-drop    Chronic knee pain        Goals    None           Electronically Signed By: Kyra Rivera MD   23  2:46 PM

## 2023-09-14 LAB
INR IN PPP BY COAGULATION ASSAY: 1.1 (ref 0.9–1.1)
PROTHROMBIN TIME (PT) IN PPP BY COAGULATION ASSAY: 12.6 SEC (ref 9.8–12.8)

## 2023-09-17 PROBLEM — N39.0 ACUTE UTI: Status: RESOLVED | Noted: 2023-04-14 | Resolved: 2023-09-17

## 2023-09-17 PROBLEM — L08.9 INFECTED DOG BITE: Status: RESOLVED | Noted: 2023-04-14 | Resolved: 2023-09-17

## 2023-09-17 PROBLEM — R11.0 NAUSEA: Status: RESOLVED | Noted: 2023-04-14 | Resolved: 2023-09-17
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
## 2023-09-17 PROBLEM — M25.561 KNEE PAIN, RIGHT: Status: RESOLVED | Noted: 2023-04-14 | Resolved: 2023-09-17

## 2023-09-17 PROBLEM — K85.90 PANCREATITIS, ACUTE (HHS-HCC): Status: RESOLVED | Noted: 2023-04-14 | Resolved: 2023-09-17

## 2023-09-17 PROBLEM — Z79.899 LONG TERM USE OF DRUG: Status: ACTIVE | Noted: 2023-09-17

## 2023-09-17 PROBLEM — M25.562 LEFT KNEE PAIN: Status: RESOLVED | Noted: 2023-04-14 | Resolved: 2023-09-17

## 2023-09-17 PROBLEM — Z79.01 ANTICOAGULATION ADEQUATE: Status: ACTIVE | Noted: 2023-09-17

## 2023-09-17 PROBLEM — R19.7 DIARRHEA: Status: RESOLVED | Noted: 2023-04-14 | Resolved: 2023-09-17

## 2023-09-17 PROBLEM — N39.0 URINARY TRACT INFECTION: Status: RESOLVED | Noted: 2023-04-14 | Resolved: 2023-09-17

## 2023-09-17 RX ORDER — LEG BRACE
EACH MISCELLANEOUS
COMMUNITY

## 2023-09-17 RX ORDER — LUTEIN 6 MG
TABLET ORAL
COMMUNITY
End: 2023-10-18 | Stop reason: SDUPTHER

## 2023-09-17 RX ORDER — ALBUTEROL SULFATE 0.83 MG/ML
2.5 SOLUTION RESPIRATORY (INHALATION) EVERY 6 HOURS PRN
COMMUNITY

## 2023-09-18 LAB
INR IN PPP BY COAGULATION ASSAY: 1.4 (ref 0.9–1.1)
PROTHROMBIN TIME (PT) IN PPP BY COAGULATION ASSAY: 16.2 SEC (ref 9.8–12.8)

## 2023-09-21 LAB
INR IN PPP BY COAGULATION ASSAY: 1.5 (ref 0.9–1.1)
PROTHROMBIN TIME (PT) IN PPP BY COAGULATION ASSAY: 16.5 SEC (ref 9.8–12.8)

## 2023-09-28 LAB
INR IN PPP BY COAGULATION ASSAY: 1.3 (ref 0.9–1.1)
PROTHROMBIN TIME (PT) IN PPP BY COAGULATION ASSAY: 14.1 SEC (ref 9.8–12.8)

## 2023-09-29 ENCOUNTER — NURSING HOME VISIT (OUTPATIENT)
Dept: POST ACUTE CARE | Facility: EXTERNAL LOCATION | Age: 53
End: 2023-09-29
Payer: MEDICAID

## 2023-09-29 DIAGNOSIS — J45.40 MODERATE PERSISTENT ASTHMA, UNSPECIFIED WHETHER COMPLICATED (HHS-HCC): ICD-10-CM

## 2023-09-29 DIAGNOSIS — D50.8 OTHER IRON DEFICIENCY ANEMIA: ICD-10-CM

## 2023-09-29 DIAGNOSIS — D68.61 ANTI-PHOSPHOLIPID SYNDROME (MULTI): Primary | ICD-10-CM

## 2023-09-29 DIAGNOSIS — M21.371 BILATERAL FOOT-DROP: ICD-10-CM

## 2023-09-29 DIAGNOSIS — M21.372 BILATERAL FOOT-DROP: ICD-10-CM

## 2023-09-29 PROCEDURE — 99308 SBSQ NF CARE LOW MDM 20: CPT | Performed by: FAMILY MEDICINE

## 2023-09-29 NOTE — PROGRESS NOTES
Subjective   Patient ID: Janene Pinedo is a 53 y.o. female who is long term resident being seen and evaluated for multiple medical problems.    Debility, foot drop bilateral, anemia, anti phospholipid syndrome, asthma         Review of Systems   Reason unable to perform ROS: debility.       Objective   There were no vitals taken for this visit.    Physical Exam  Vitals and nursing note reviewed.   Constitutional:       Appearance: She is ill-appearing.   HENT:      Head: Normocephalic and atraumatic.      Nose: Nose normal.      Mouth/Throat:      Mouth: Mucous membranes are moist.      Pharynx: Oropharynx is clear.   Eyes:      Extraocular Movements: Extraocular movements intact.      Conjunctiva/sclera: Conjunctivae normal.      Pupils: Pupils are equal, round, and reactive to light.   Cardiovascular:      Rate and Rhythm: Normal rate and regular rhythm.      Pulses: Normal pulses.   Pulmonary:      Effort: Pulmonary effort is normal.      Breath sounds: Wheezing present.   Abdominal:      General: Bowel sounds are normal.      Palpations: Abdomen is soft.   Musculoskeletal:         General: Tenderness present. Normal range of motion.      Cervical back: Normal range of motion and neck supple.   Skin:     General: Skin is warm and dry.      Capillary Refill: Capillary refill takes less than 2 seconds.      Coloration: Skin is pale.   Neurological:      Mental Status: She is alert. Mental status is at baseline.      Motor: Weakness present.      Gait: Gait abnormal.      Comments: Foot drop, bilateral   Psychiatric:         Mood and Affect: Mood normal. Flat affect: flat.         Speech: Speech normal.         Behavior: Behavior normal.         Thought Content: Thought content normal.         Judgment: Judgment normal.         Assessment/Plan   Problem List Items Addressed This Visit             ICD-10-CM    Anemia D64.9    Anti-phospholipid syndrome (CMS/HCC) - Primary D68.61    Asthma J45.909    Bilateral  foot-drop M21.371, M21.372        Goals    None

## 2023-09-29 NOTE — LETTER
Patient: Janene Pinedo  : 1970    Encounter Date: 2023    Subjective  Patient ID: Janene Pinedo is a 53 y.o. female who is long term resident being seen and evaluated for multiple medical problems.    Debility, foot drop bilateral, anemia, anti phospholipid syndrome, asthma         Review of Systems   Reason unable to perform ROS: debility.       Objective  There were no vitals taken for this visit.    Physical Exam  Vitals and nursing note reviewed.   Constitutional:       Appearance: She is ill-appearing.   HENT:      Head: Normocephalic and atraumatic.      Nose: Nose normal.      Mouth/Throat:      Mouth: Mucous membranes are moist.      Pharynx: Oropharynx is clear.   Eyes:      Extraocular Movements: Extraocular movements intact.      Conjunctiva/sclera: Conjunctivae normal.      Pupils: Pupils are equal, round, and reactive to light.   Cardiovascular:      Rate and Rhythm: Normal rate and regular rhythm.      Pulses: Normal pulses.   Pulmonary:      Effort: Pulmonary effort is normal.      Breath sounds: Wheezing present.   Abdominal:      General: Bowel sounds are normal.      Palpations: Abdomen is soft.   Musculoskeletal:         General: Tenderness present. Normal range of motion.      Cervical back: Normal range of motion and neck supple.   Skin:     General: Skin is warm and dry.      Capillary Refill: Capillary refill takes less than 2 seconds.      Coloration: Skin is pale.   Neurological:      Mental Status: She is alert. Mental status is at baseline.      Motor: Weakness present.      Gait: Gait abnormal.   Psychiatric:         Mood and Affect: Mood normal. Flat affect: flat.         Speech: Speech normal.         Behavior: Behavior normal.         Thought Content: Thought content normal.         Judgment: Judgment normal.         Assessment/Plan  Problem List Items Addressed This Visit             ICD-10-CM    Anemia D64.9    Anti-phospholipid syndrome (CMS/HCC) - Primary D68.61     Asthma J45.909    Bilateral foot-drop M21.371, M21.372        Goals    None           Electronically Signed By: Kyra Rivera MD   9/29/23  8:40 AM

## 2023-10-05 ENCOUNTER — LAB (OUTPATIENT)
Dept: LAB | Facility: LAB | Age: 53
End: 2023-10-05
Payer: MEDICAID

## 2023-10-05 DIAGNOSIS — N18.4 CHRONIC KIDNEY DISEASE, STAGE 4 (SEVERE) (MULTI): Primary | ICD-10-CM

## 2023-10-05 LAB
ALBUMIN SERPL BCP-MCNC: 3.4 G/DL (ref 3.4–5)
ANION GAP SERPL CALC-SCNC: 13 MMOL/L (ref 10–20)
APPEARANCE UR: CLEAR
BILIRUB UR STRIP.AUTO-MCNC: NEGATIVE MG/DL
BUN SERPL-MCNC: 30 MG/DL (ref 6–23)
CALCIUM SERPL-MCNC: 8.3 MG/DL (ref 8.6–10.3)
CHLORIDE SERPL-SCNC: 115 MMOL/L (ref 98–107)
CO2 SERPL-SCNC: 17 MMOL/L (ref 21–32)
COLOR UR: YELLOW
CREAT SERPL-MCNC: 2.26 MG/DL (ref 0.5–1.05)
CREAT UR-MCNC: 64.7 MG/DL (ref 20–320)
CREAT UR-MCNC: 64.7 MG/DL (ref 20–320)
ERYTHROCYTE [DISTWIDTH] IN BLOOD BY AUTOMATED COUNT: 15.1 % (ref 11.5–14.5)
GFR SERPL CREATININE-BSD FRML MDRD: 26 ML/MIN/1.73M*2
GLUCOSE SERPL-MCNC: 106 MG/DL (ref 74–99)
GLUCOSE UR STRIP.AUTO-MCNC: ABNORMAL MG/DL
HCT VFR BLD AUTO: 25.9 % (ref 36–46)
HGB BLD-MCNC: 7.7 G/DL (ref 12–16)
KETONES UR STRIP.AUTO-MCNC: NEGATIVE MG/DL
LEUKOCYTE ESTERASE UR QL STRIP.AUTO: ABNORMAL
MCH RBC QN AUTO: 29.6 PG (ref 26–34)
MCHC RBC AUTO-ENTMCNC: 29.7 G/DL (ref 32–36)
MCV RBC AUTO: 100 FL (ref 80–100)
MICROALBUMIN UR-MCNC: 1061.6 MG/L
MICROALBUMIN/CREAT UR: 1640.8 UG/MG CREAT
MUCOUS THREADS #/AREA URNS AUTO: ABNORMAL /LPF
NITRITE UR QL STRIP.AUTO: NEGATIVE
NRBC BLD-RTO: 0 /100 WBCS (ref 0–0)
PH UR STRIP.AUTO: 6 [PH]
PHOSPHATE SERPL-MCNC: 4 MG/DL (ref 2.5–4.9)
PLATELET # BLD AUTO: 197 X10*3/UL (ref 150–450)
PMV BLD AUTO: 11.4 FL (ref 7.5–11.5)
POTASSIUM SERPL-SCNC: 5 MMOL/L (ref 3.5–5.3)
PROT UR STRIP.AUTO-MCNC: ABNORMAL MG/DL
PROT UR-ACNC: 198 MG/DL (ref 5–24)
PROT/CREAT UR: 3.06 MG/MG CREAT (ref 0–0.17)
RBC # BLD AUTO: 2.6 X10*6/UL (ref 4–5.2)
RBC # UR STRIP.AUTO: NEGATIVE /UL
RBC #/AREA URNS AUTO: ABNORMAL /HPF
SODIUM SERPL-SCNC: 140 MMOL/L (ref 136–145)
SP GR UR STRIP.AUTO: 1.01
SQUAMOUS #/AREA URNS AUTO: ABNORMAL /HPF
UROBILINOGEN UR STRIP.AUTO-MCNC: <2 MG/DL
WBC # BLD AUTO: 7.1 X10*3/UL (ref 4.4–11.3)
WBC #/AREA URNS AUTO: ABNORMAL /HPF

## 2023-10-05 PROCEDURE — 85027 COMPLETE CBC AUTOMATED: CPT

## 2023-10-05 PROCEDURE — 82570 ASSAY OF URINE CREATININE: CPT

## 2023-10-05 PROCEDURE — 81001 URINALYSIS AUTO W/SCOPE: CPT

## 2023-10-05 PROCEDURE — 84156 ASSAY OF PROTEIN URINE: CPT

## 2023-10-05 PROCEDURE — 80069 RENAL FUNCTION PANEL: CPT

## 2023-10-05 PROCEDURE — 82043 UR ALBUMIN QUANTITATIVE: CPT

## 2023-10-05 PROCEDURE — 82306 VITAMIN D 25 HYDROXY: CPT

## 2023-10-05 PROCEDURE — 36415 COLL VENOUS BLD VENIPUNCTURE: CPT

## 2023-10-05 PROCEDURE — 83970 ASSAY OF PARATHORMONE: CPT

## 2023-10-06 LAB
25(OH)D3 SERPL-MCNC: 30 NG/ML (ref 30–100)
PTH-INTACT SERPL-MCNC: 62 PG/ML (ref 18.5–88)

## 2023-10-15 DIAGNOSIS — N18.4 CHRONIC KIDNEY DISEASE, STAGE 4 (SEVERE) (MULTI): Primary | ICD-10-CM

## 2023-10-15 DIAGNOSIS — D63.1 ANEMIA DUE TO STAGE 4 CHRONIC KIDNEY DISEASE (MULTI): ICD-10-CM

## 2023-10-15 DIAGNOSIS — N18.4 ANEMIA DUE TO STAGE 4 CHRONIC KIDNEY DISEASE (MULTI): ICD-10-CM

## 2023-10-15 RX ORDER — FAMOTIDINE 10 MG/ML
20 INJECTION INTRAVENOUS ONCE AS NEEDED
Status: CANCELLED | OUTPATIENT
Start: 2023-10-15

## 2023-10-15 RX ORDER — ALBUTEROL SULFATE 0.83 MG/ML
3 SOLUTION RESPIRATORY (INHALATION) AS NEEDED
Status: CANCELLED | OUTPATIENT
Start: 2023-10-15

## 2023-10-15 RX ORDER — EPINEPHRINE 0.3 MG/.3ML
0.3 INJECTION SUBCUTANEOUS EVERY 5 MIN PRN
Status: CANCELLED | OUTPATIENT
Start: 2023-10-15

## 2023-10-18 ENCOUNTER — OFFICE VISIT (OUTPATIENT)
Dept: NEPHROLOGY | Facility: CLINIC | Age: 53
End: 2023-10-18
Payer: MEDICAID

## 2023-10-18 VITALS
RESPIRATION RATE: 18 BRPM | DIASTOLIC BLOOD PRESSURE: 74 MMHG | SYSTOLIC BLOOD PRESSURE: 120 MMHG | HEART RATE: 103 BPM | BODY MASS INDEX: 32.44 KG/M2 | TEMPERATURE: 97.7 F | WEIGHT: 171.8 LBS | HEIGHT: 61 IN

## 2023-10-18 DIAGNOSIS — D84.9 IMMUNOSUPPRESSED STATUS (MULTI): ICD-10-CM

## 2023-10-18 DIAGNOSIS — I12.9 HYPERTENSIVE KIDNEY DISEASE WITH STAGE 4 CHRONIC KIDNEY DISEASE (MULTI): Primary | ICD-10-CM

## 2023-10-18 DIAGNOSIS — D63.1 ANEMIA DUE TO STAGE 4 CHRONIC KIDNEY DISEASE (MULTI): ICD-10-CM

## 2023-10-18 DIAGNOSIS — N18.4 ANEMIA DUE TO STAGE 4 CHRONIC KIDNEY DISEASE (MULTI): ICD-10-CM

## 2023-10-18 DIAGNOSIS — N18.4 HYPERTENSIVE KIDNEY DISEASE WITH STAGE 4 CHRONIC KIDNEY DISEASE (MULTI): Primary | ICD-10-CM

## 2023-10-18 DIAGNOSIS — E11.22 TYPE 2 DIABETES MELLITUS WITH DIABETIC CHRONIC KIDNEY DISEASE, UNSPECIFIED CKD STAGE, UNSPECIFIED WHETHER LONG TERM INSULIN USE (MULTI): ICD-10-CM

## 2023-10-18 DIAGNOSIS — N05.2 MEMBRANOUS GLOMERULONEPHRITIS: ICD-10-CM

## 2023-10-18 PROCEDURE — 3044F HG A1C LEVEL LT 7.0%: CPT | Performed by: INTERNAL MEDICINE

## 2023-10-18 PROCEDURE — 4010F ACE/ARB THERAPY RXD/TAKEN: CPT | Performed by: INTERNAL MEDICINE

## 2023-10-18 PROCEDURE — 3078F DIAST BP <80 MM HG: CPT | Performed by: INTERNAL MEDICINE

## 2023-10-18 PROCEDURE — 1036F TOBACCO NON-USER: CPT | Performed by: INTERNAL MEDICINE

## 2023-10-18 PROCEDURE — 3062F POS MACROALBUMINURIA REV: CPT | Performed by: INTERNAL MEDICINE

## 2023-10-18 PROCEDURE — 3066F NEPHROPATHY DOC TX: CPT | Performed by: INTERNAL MEDICINE

## 2023-10-18 PROCEDURE — 3074F SYST BP LT 130 MM HG: CPT | Performed by: INTERNAL MEDICINE

## 2023-10-18 PROCEDURE — 99213 OFFICE O/P EST LOW 20 MIN: CPT | Performed by: INTERNAL MEDICINE

## 2023-10-18 RX ORDER — SODIUM BICARBONATE 650 MG/1
1300 TABLET ORAL 2 TIMES DAILY
Qty: 360 TABLET | Refills: 3 | Status: SHIPPED | OUTPATIENT
Start: 2023-10-18 | End: 2024-10-17

## 2023-10-18 RX ORDER — SODIUM BICARBONATE 650 MG/1
1300 TABLET ORAL 2 TIMES DAILY
Qty: 360 TABLET | Refills: 3 | Status: SHIPPED | OUTPATIENT
Start: 2023-10-18 | End: 2023-10-18 | Stop reason: SDUPTHER

## 2023-10-18 NOTE — PROGRESS NOTES
Notes pain in B kidneys x 1.5 months,  No hematuria/fever  Eating well  Denies nausea, vomiting, chest pain, dyspnea, diarrhea  No urinary symptoms    NAD  Sclera AI s inj  MMM, no sores  Deferred secondary to COVID  Chronic LE edema, wearing chan hose  No tremor  No rash, ecchymosis on belly with subcutaneous heparin injections    CKD stage 4 stable  HTN well controlled  Proteinuria improved  Acidemia  Unclear etiology of flank pain, ? Recurrent hydro  Anemia req ROB    Discuss metformin with PCP; patient reluctant to stop given c/f hyperglycemia  Renal US  Increase bicarb to 1300 mg twice daily  Labs and follow up next month  RTC for ROB   Follow up with me in 3 months  Labs next month

## 2023-10-18 NOTE — LETTER
October 18, 2023     Scott Ramos DO  [address]  [facility], [address]  [facility] [address]    Patient: Janene Pinedo   YOB: 1970   Date of Visit: 10/18/2023       Dear Dr. Scott Ramos DO:    Thank you for referring Janene Pinedo to me for evaluation. Below are my notes for this consultation.  If you have questions, please do not hesitate to call me. I look forward to following your patient along with you.       Sincerely,     Kay Sanders MD      CC: No Recipients  ______________________________________________________________________________________    Notes pain in B kidneys x 1.5 months,  No hematuria/fever  Eating well  Denies nausea, vomiting, chest pain, dyspnea, diarrhea  No urinary symptoms    NAD  Sclera AI s inj  MMM, no sores  Deferred secondary to COVID  Chronic LE edema, wearing chan hose  No tremor  No rash, ecchymosis on belly with subcutaneous heparin injections    CKD stage 4 stable  HTN well controlled  Proteinuria improved  Acidemia  Unclear etiology of flank pain, ? Recurrent hydro  Anemia req ROB    Discuss metformin with PCP; patient reluctant to stop given c/f hyperglycemia  Renal US  Increase bicarb to 1300 mg twice daily  Labs and follow up next month  RTC for ROB   Follow up with me in 3 months  Labs next month

## 2023-10-18 NOTE — LETTER
October 18, 2023     Scott Ramos DO  [address]  [facility], [address]  [facility] [address]    Patient: Janene Pinedo   YOB: 1970   Date of Visit: 10/18/2023       Dear Dr. Scott Ramos DO:    Thank you for referring Janene Pinedo to me for evaluation. Below are my notes for this consultation.  If you have questions, please do not hesitate to call me. I look forward to following your patient along with you.       Sincerely,     Kay Sanders MD      CC: No Recipients  ______________________________________________________________________________________    Notes pain in B kidneys x 1.5 months,  No hematuria/fever  Eating well  Denies nausea, vomiting, chest pain, dyspnea, diarrhea  No urinary symptoms    NAD  Sclera AI s inj  MMM, no sores  Deferred secondary to COVID  Chronic LE edema, wearing chan hose  No tremor  No rash, ecchymosis on belly with subcutaneous heparin injections    CKD stage 4 stable  HTN well controlled  Proteinuria improved  Acidemia  Unclear etiology of flank pain, ? Recurrent hydro  Anemia req ROB    Discuss metformin with PCP; patient reluctant to stop given c/f hyperglycemia  Renal US  Increase bicarb to 1300 mg twice daily  Labs and follow up next month  RTC for ROB   Follow up with me in 3 months  Labs next month

## 2023-10-19 ENCOUNTER — APPOINTMENT (OUTPATIENT)
Dept: RADIOLOGY | Facility: HOSPITAL | Age: 53
End: 2023-10-19
Payer: MEDICAID

## 2023-10-19 ENCOUNTER — APPOINTMENT (OUTPATIENT)
Dept: PRIMARY CARE | Facility: CLINIC | Age: 53
End: 2023-10-19
Payer: MEDICAID

## 2023-10-19 ENCOUNTER — HOSPITAL ENCOUNTER (EMERGENCY)
Facility: HOSPITAL | Age: 53
Discharge: HOME | End: 2023-10-20
Attending: EMERGENCY MEDICINE
Payer: MEDICAID

## 2023-10-19 ENCOUNTER — HOSPITAL ENCOUNTER (OUTPATIENT)
Dept: DIALYSIS | Facility: HOSPITAL | Age: 53
Setting detail: DIALYSIS SERIES
Discharge: STILL ADMITTED OR ADMINISTRATIVE | End: 2023-10-19
Payer: MEDICAID

## 2023-10-19 VITALS
HEART RATE: 95 BPM | SYSTOLIC BLOOD PRESSURE: 103 MMHG | WEIGHT: 172.84 LBS | DIASTOLIC BLOOD PRESSURE: 70 MMHG | BODY MASS INDEX: 32.66 KG/M2

## 2023-10-19 DIAGNOSIS — N18.4 CHRONIC KIDNEY DISEASE, STAGE 4 (SEVERE) (MULTI): Primary | ICD-10-CM

## 2023-10-19 DIAGNOSIS — D63.1 ANEMIA DUE TO STAGE 4 CHRONIC KIDNEY DISEASE (MULTI): ICD-10-CM

## 2023-10-19 DIAGNOSIS — D64.9 ANEMIA, UNSPECIFIED TYPE: Primary | ICD-10-CM

## 2023-10-19 DIAGNOSIS — N18.4 ANEMIA DUE TO STAGE 4 CHRONIC KIDNEY DISEASE (MULTI): ICD-10-CM

## 2023-10-19 LAB
ABO GROUP (TYPE) IN BLOOD: NORMAL
ALBUMIN SERPL BCP-MCNC: 3.7 G/DL (ref 3.4–5)
ALBUMIN SERPL BCP-MCNC: 3.9 G/DL (ref 3.4–5)
ALP SERPL-CCNC: 92 U/L (ref 33–110)
ALT SERPL W P-5'-P-CCNC: 9 U/L (ref 7–45)
ANION GAP SERPL CALC-SCNC: 13 MMOL/L (ref 10–20)
ANION GAP SERPL CALC-SCNC: 14 MMOL/L (ref 10–20)
ANTIBODY SCREEN: NORMAL
APTT PPP: 52 SECONDS (ref 27–38)
AST SERPL W P-5'-P-CCNC: 11 U/L (ref 9–39)
BASOPHILS # BLD AUTO: 0.03 X10*3/UL (ref 0–0.1)
BASOPHILS NFR BLD AUTO: 0.5 %
BILIRUB SERPL-MCNC: 0.2 MG/DL (ref 0–1.2)
BUN SERPL-MCNC: 32 MG/DL (ref 6–23)
BUN SERPL-MCNC: 33 MG/DL (ref 6–23)
CALCIUM SERPL-MCNC: 8.7 MG/DL (ref 8.6–10.6)
CALCIUM SERPL-MCNC: 8.9 MG/DL (ref 8.6–10.3)
CHLORIDE SERPL-SCNC: 110 MMOL/L (ref 98–107)
CHLORIDE SERPL-SCNC: 113 MMOL/L (ref 98–107)
CO2 SERPL-SCNC: 19 MMOL/L (ref 21–32)
CO2 SERPL-SCNC: 21 MMOL/L (ref 21–32)
CREAT SERPL-MCNC: 2.3 MG/DL (ref 0.5–1.05)
CREAT SERPL-MCNC: 2.43 MG/DL (ref 0.5–1.05)
EOSINOPHIL # BLD AUTO: 0.31 X10*3/UL (ref 0–0.7)
EOSINOPHIL NFR BLD AUTO: 5.2 %
ERYTHROCYTE [DISTWIDTH] IN BLOOD BY AUTOMATED COUNT: 15.9 % (ref 11.5–14.5)
FERRITIN SERPL-MCNC: 449 NG/ML (ref 8–150)
GFR SERPL CREATININE-BSD FRML MDRD: 23 ML/MIN/1.73M*2
GFR SERPL CREATININE-BSD FRML MDRD: 25 ML/MIN/1.73M*2
GLUCOSE SERPL-MCNC: 103 MG/DL (ref 74–99)
GLUCOSE SERPL-MCNC: 79 MG/DL (ref 74–99)
HCT VFR BLD AUTO: 24 % (ref 36–46)
HGB BLD-MCNC: 7.2 G/DL (ref 12–16)
HOLD SPECIMEN: NORMAL
IMM GRANULOCYTES # BLD AUTO: 0.19 X10*3/UL (ref 0–0.7)
IMM GRANULOCYTES NFR BLD AUTO: 3.2 % (ref 0–0.9)
INR PPP: 1.6 (ref 0.9–1.1)
IRON SATN MFR SERPL: 28 % (ref 25–45)
IRON SERPL-MCNC: 71 UG/DL (ref 35–150)
LYMPHOCYTES # BLD AUTO: 1.33 X10*3/UL (ref 1.2–4.8)
LYMPHOCYTES NFR BLD AUTO: 22.5 %
MAGNESIUM SERPL-MCNC: 2.25 MG/DL (ref 1.6–2.4)
MCH RBC QN AUTO: 30.4 PG (ref 26–34)
MCHC RBC AUTO-ENTMCNC: 30 G/DL (ref 32–36)
MCV RBC AUTO: 101 FL (ref 80–100)
MONOCYTES # BLD AUTO: 0.35 X10*3/UL (ref 0.1–1)
MONOCYTES NFR BLD AUTO: 5.9 %
NEUTROPHILS # BLD AUTO: 3.71 X10*3/UL (ref 1.2–7.7)
NEUTROPHILS NFR BLD AUTO: 62.7 %
NRBC BLD-RTO: 0 /100 WBCS (ref 0–0)
PHOSPHATE SERPL-MCNC: 5 MG/DL (ref 2.5–4.9)
PLATELET # BLD AUTO: 163 X10*3/UL (ref 150–450)
PMV BLD AUTO: 11.2 FL (ref 7.5–11.5)
POTASSIUM SERPL-SCNC: 5.1 MMOL/L (ref 3.5–5.3)
POTASSIUM SERPL-SCNC: 5.5 MMOL/L (ref 3.5–5.3)
PROT SERPL-MCNC: 6.2 G/DL (ref 6.4–8.2)
PROTHROMBIN TIME: 18.3 SECONDS (ref 9.8–12.8)
RBC # BLD AUTO: 2.37 X10*6/UL (ref 4–5.2)
RH FACTOR (ANTIGEN D): NORMAL
SODIUM SERPL-SCNC: 139 MMOL/L (ref 136–145)
SODIUM SERPL-SCNC: 140 MMOL/L (ref 136–145)
TIBC SERPL-MCNC: 252 UG/DL (ref 240–445)
UIBC SERPL-MCNC: 181 UG/DL (ref 110–370)
WBC # BLD AUTO: 5.9 X10*3/UL (ref 4.4–11.3)

## 2023-10-19 PROCEDURE — 99284 EMERGENCY DEPT VISIT MOD MDM: CPT | Performed by: EMERGENCY MEDICINE

## 2023-10-19 PROCEDURE — 96374 THER/PROPH/DIAG INJ IV PUSH: CPT

## 2023-10-19 PROCEDURE — 85610 PROTHROMBIN TIME: CPT | Performed by: EMERGENCY MEDICINE

## 2023-10-19 PROCEDURE — 83735 ASSAY OF MAGNESIUM: CPT | Performed by: EMERGENCY MEDICINE

## 2023-10-19 PROCEDURE — 2500000004 HC RX 250 GENERAL PHARMACY W/ HCPCS (ALT 636 FOR OP/ED): Mod: JZ,SE

## 2023-10-19 PROCEDURE — 74176 CT ABD & PELVIS W/O CONTRAST: CPT | Mod: FR

## 2023-10-19 PROCEDURE — 36415 COLL VENOUS BLD VENIPUNCTURE: CPT | Performed by: INTERNAL MEDICINE

## 2023-10-19 PROCEDURE — 85730 THROMBOPLASTIN TIME PARTIAL: CPT | Performed by: EMERGENCY MEDICINE

## 2023-10-19 PROCEDURE — 83550 IRON BINDING TEST: CPT | Performed by: INTERNAL MEDICINE

## 2023-10-19 PROCEDURE — 82668 ASSAY OF ERYTHROPOIETIN: CPT | Performed by: INTERNAL MEDICINE

## 2023-10-19 PROCEDURE — 80053 COMPREHEN METABOLIC PANEL: CPT | Mod: CMCLAB | Performed by: INTERNAL MEDICINE

## 2023-10-19 PROCEDURE — 85025 COMPLETE CBC W/AUTO DIFF WBC: CPT | Performed by: EMERGENCY MEDICINE

## 2023-10-19 PROCEDURE — 36415 COLL VENOUS BLD VENIPUNCTURE: CPT

## 2023-10-19 PROCEDURE — 74176 CT ABD & PELVIS W/O CONTRAST: CPT | Mod: FOREIGN READ | Performed by: RADIOLOGY

## 2023-10-19 PROCEDURE — 82728 ASSAY OF FERRITIN: CPT | Mod: CMCLAB | Performed by: INTERNAL MEDICINE

## 2023-10-19 PROCEDURE — 84075 ASSAY ALKALINE PHOSPHATASE: CPT | Performed by: EMERGENCY MEDICINE

## 2023-10-19 PROCEDURE — 86900 BLOOD TYPING SEROLOGIC ABO: CPT | Performed by: EMERGENCY MEDICINE

## 2023-10-19 PROCEDURE — 36415 COLL VENOUS BLD VENIPUNCTURE: CPT | Performed by: EMERGENCY MEDICINE

## 2023-10-19 RX ORDER — EPINEPHRINE 0.3 MG/.3ML
0.3 INJECTION SUBCUTANEOUS EVERY 5 MIN PRN
Status: CANCELLED | OUTPATIENT
Start: 2023-11-16

## 2023-10-19 RX ORDER — FAMOTIDINE 10 MG/ML
20 INJECTION INTRAVENOUS ONCE AS NEEDED
Status: CANCELLED | OUTPATIENT
Start: 2023-11-16

## 2023-10-19 RX ORDER — ALBUTEROL SULFATE 0.83 MG/ML
3 SOLUTION RESPIRATORY (INHALATION) AS NEEDED
Status: CANCELLED | OUTPATIENT
Start: 2023-11-16

## 2023-10-19 RX ADMIN — DARBEPOETIN ALFA 150 MCG: 150 INJECTION, SOLUTION INTRAVENOUS; SUBCUTANEOUS at 11:23

## 2023-10-19 ASSESSMENT — PAIN SCALES - GENERAL
PAINLEVEL_OUTOF10: 0 - NO PAIN
PAINLEVEL_OUTOF10: 6

## 2023-10-19 ASSESSMENT — PAIN - FUNCTIONAL ASSESSMENT
PAIN_FUNCTIONAL_ASSESSMENT: 0-10
PAIN_FUNCTIONAL_ASSESSMENT: 0-10

## 2023-10-19 ASSESSMENT — COLUMBIA-SUICIDE SEVERITY RATING SCALE - C-SSRS
1. IN THE PAST MONTH, HAVE YOU WISHED YOU WERE DEAD OR WISHED YOU COULD GO TO SLEEP AND NOT WAKE UP?: NO
6. HAVE YOU EVER DONE ANYTHING, STARTED TO DO ANYTHING, OR PREPARED TO DO ANYTHING TO END YOUR LIFE?: NO
2. HAVE YOU ACTUALLY HAD ANY THOUGHTS OF KILLING YOURSELF?: NO

## 2023-10-19 NOTE — PROGRESS NOTES
"Patient states her nursing home vonda blood this morning, and reports hbg under 7. Dr. Sanders notified via EPIC messaging. Patient feels well, no complaints of SOB, dizziness, fainting. Energy level reported as \"so-so\". States she does take naps during the day. No N/V. Appetite \"fair\", states she eats lunch, and a very small salad for dinner. Does report food does not taste good, and this has been occurring for a couple months. Patient does drink Glucerna once daily.     Vitals:    10/19/23 1109   BP: 103/70   Pulse: 95      Vitals:    10/19/23 1105   Weight: 78.4 kg (172 lb 13.5 oz)      "

## 2023-10-19 NOTE — ADDENDUM NOTE
Encounter addended by: Zenobia Walls RN on: 10/19/2023 4:47 PM   Actions taken: Charge Capture section accepted

## 2023-10-20 ENCOUNTER — DOCUMENTATION (OUTPATIENT)
Dept: PRIMARY CARE | Facility: CLINIC | Age: 53
End: 2023-10-20
Payer: MEDICAID

## 2023-10-20 VITALS
HEIGHT: 64 IN | SYSTOLIC BLOOD PRESSURE: 123 MMHG | BODY MASS INDEX: 29.02 KG/M2 | HEART RATE: 61 BPM | RESPIRATION RATE: 18 BRPM | OXYGEN SATURATION: 97 % | WEIGHT: 170 LBS | DIASTOLIC BLOOD PRESSURE: 88 MMHG | TEMPERATURE: 98.1 F

## 2023-10-20 DIAGNOSIS — I12.9 HYPERTENSIVE KIDNEY DISEASE WITH STAGE 4 CHRONIC KIDNEY DISEASE (MULTI): Primary | ICD-10-CM

## 2023-10-20 DIAGNOSIS — N18.4 HYPERTENSIVE KIDNEY DISEASE WITH STAGE 4 CHRONIC KIDNEY DISEASE (MULTI): Primary | ICD-10-CM

## 2023-10-20 DIAGNOSIS — E11.9 TYPE 2 DIABETES MELLITUS WITHOUT COMPLICATION, UNSPECIFIED WHETHER LONG TERM INSULIN USE (MULTI): Primary | ICD-10-CM

## 2023-10-20 NOTE — PROGRESS NOTES
Dr villalobos message me advising concern about metformin given kidney function CKD stage IV I agree risk of lactic acidosis discontinue metformin change over to Januvia 25 mg daily she agreed with this plan I notified patient and the nursing home [facility] of this change thank you

## 2023-10-20 NOTE — ED PROVIDER NOTES
HPI   Chief Complaint   Patient presents with    Abnormal Labs       Patient is a 53-year-old female, medical history significant for chronic kidney disease, history of DVT and pulmonary embolus who is currently on warfarin with bridging Lovenox, presents to the emergency department abnormal labs.  Patient states she had labs done which showed that she was anemic in the sixes.  She states that the last time she had something like this, was because she had hemorrhage around her kidney.  In review of the records, patient did have a perinephric hematoma status post renal biopsy.  She denies any vitor abdominal pain.  She has noticed some bruising at the area of her Lovenox shots.  She denies any blood loss per rectum.  She is not currently on dialysis.                          No data recorded                Patient History   Past Medical History:   Diagnosis Date    Body mass index (BMI) 32.0-32.9, adult     BMI 32.0-32.9,adult    Body mass index (BMI) 32.0-32.9, adult     BMI 32.0-32.9,adult    Body mass index (BMI) 35.0-35.9, adult     BMI 35.0-35.9,adult    Migraine, unspecified, not intractable, without status migrainosus 07/31/2020    Migraine, unspecified, not intractable, without status migrainosus    Nausea 03/03/2015    Nausea    Other specified health status     No pertinent past medical history    Other specified health status     No pertinent past surgical history    Unspecified asthma, uncomplicated 11/09/2022    Asthma     Past Surgical History:   Procedure Laterality Date    IR EMBOLIZATION LYMPH NODE Bilateral 9/28/2021    IR EMBOLIZATION LYMPH NODE 9/28/2021 [facility]    TONSILLECTOMY  02/26/2016    Tonsillectomy     Family History   Problem Relation Name Age of Onset    Arthritis Mother      Diabetes Mother      Hypertension Mother      Cancer Mother      Breast cancer Mother      Migraines Mother      Arthritis Father      Hypertension Father      Cancer Maternal Grandmother      Breast  cancer Maternal Grandmother      Other (primary cervical cancer) Maternal Grandmother      Alzheimer's disease Paternal Grandmother      Stroke Paternal Grandmother       Social History     Tobacco Use    Smoking status: Former     Types: Cigarettes     Quit date:      Years since quittin.8    Smokeless tobacco: Never   Substance Use Topics    Alcohol use: Not Currently    Drug use: Never       Physical Exam   ED Triage Vitals [10/19/23 2055]   Temp Heart Rate Resp BP   36.7 °C (98.1 °F) 98 18 140/68      SpO2 Temp src Heart Rate Source Patient Position   97 % -- -- --      BP Location FiO2 (%)     -- --       Physical Exam  Vitals and nursing note reviewed.   Constitutional:       General: She is not in acute distress.     Appearance: She is well-developed.   HENT:      Head: Normocephalic and atraumatic.   Eyes:      Conjunctiva/sclera: Conjunctivae normal.   Cardiovascular:      Rate and Rhythm: Normal rate and regular rhythm.      Heart sounds: No murmur heard.  Pulmonary:      Effort: Pulmonary effort is normal. No respiratory distress.      Breath sounds: Normal breath sounds.   Abdominal:      General: Abdomen is flat.      Palpations: Abdomen is soft.      Tenderness: There is no abdominal tenderness.      Comments: Multiple areas of small ecchymosis   Musculoskeletal:         General: No swelling.      Cervical back: Normal range of motion and neck supple.   Skin:     General: Skin is warm and dry.      Capillary Refill: Capillary refill takes less than 2 seconds.   Neurological:      General: No focal deficit present.      Mental Status: She is alert and oriented to person, place, and time.   Psychiatric:         Mood and Affect: Mood normal.         ED Course & MDM   ED Course as of 10/19/23 2240   Thu Oct 19, 2023   2156 EKG demonstrates sinus rhythm.  Rate of 88.  Normal axis intervals.  No acute ischemia.  No STEMI. [MK]   2225 CT shows no evidence of retroperitoneal bleeding.  There were  small subcutaneous nodules which I feel are likely hematomas from her injections. [MK]      ED Course User Index  [MK] Abilio Conklin MD         Diagnoses as of 10/19/23 2240   Anemia, unspecified type       Medical Decision Making  Medical Decision Making: Patient presents to the emergency department due to concern for abnormal labs.  Patient was hospitalized with retroperitoneal hemorrhage after kidney biopsy.  She is currently on anticoagulation getting Lovenox and bridged to Coumadin.  Her abdomen is soft.  She does have some small areas of hematoma likely secondary from her shots.  She has not had any gross hematuria.  She denies any other blood loss symptoms.  Labs are obtained.  Her hemoglobin is stable at 7.2.  Her CT does not show any acute hemorrhage.  It does confirm the small hematomas.  I do feel that that is likely some of her blood loss.  She is not at the level of transfusion.  She is not actively bleeding.  At this point, do feel that she is safe for discharge back to her skilled nursing facility.  Differential Diagnoses Considered: Anemia, blood loss, hemorrhage    Chronic Medical Conditions Significantly Affecting Care: History of prior retroperitoneal hemorrhage    External Records Reviewed: I reviewed recent and relevant outside records including: Medication reconciliation  Independent Interpretation of Studies:  I independently interpreted: CT obtained and reviewed    Escalation of Care:  Appropriate for continued management under skilled facility  Social Determinants of Health Significantly Affecting Care:  No social determinants    Prescription Drug Consideration: No medications              Procedure  Procedures     Abilio Conklin MD  10/19/23 2240

## 2023-10-20 NOTE — ED TRIAGE NOTES
Pt presents to ED via EMS with c/o low hemoglobin 6.7 per nursing home. Pt states she has needed transfusion in the past, also states she has bruising to her abdomen due to daily lovenox shots. Pt denies any complaints.

## 2023-10-24 ENCOUNTER — APPOINTMENT (OUTPATIENT)
Dept: RADIOLOGY | Facility: HOSPITAL | Age: 53
End: 2023-10-24
Payer: MEDICAID

## 2023-10-24 ENCOUNTER — HOSPITAL ENCOUNTER (EMERGENCY)
Facility: HOSPITAL | Age: 53
Discharge: NURSING, REHAB OR LONG-TERM CARE | End: 2023-10-24
Attending: EMERGENCY MEDICINE
Payer: MEDICAID

## 2023-10-24 VITALS
DIASTOLIC BLOOD PRESSURE: 60 MMHG | WEIGHT: 165 LBS | HEART RATE: 86 BPM | HEIGHT: 60 IN | OXYGEN SATURATION: 99 % | BODY MASS INDEX: 32.39 KG/M2 | RESPIRATION RATE: 18 BRPM | TEMPERATURE: 98.2 F | SYSTOLIC BLOOD PRESSURE: 124 MMHG

## 2023-10-24 DIAGNOSIS — D64.9 ANEMIA, UNSPECIFIED TYPE: Primary | ICD-10-CM

## 2023-10-24 LAB
ABO GROUP (TYPE) IN BLOOD: NORMAL
ABO GROUP (TYPE) IN BLOOD: NORMAL
ALBUMIN SERPL BCP-MCNC: 3.7 G/DL (ref 3.4–5)
ALP SERPL-CCNC: 96 U/L (ref 33–110)
ALT SERPL W P-5'-P-CCNC: 7 U/L (ref 7–45)
ANION GAP SERPL CALC-SCNC: 11 MMOL/L (ref 10–20)
ANTIBODY SCREEN: NORMAL
AST SERPL W P-5'-P-CCNC: 9 U/L (ref 9–39)
BASOPHILS # BLD MANUAL: 0.06 X10*3/UL (ref 0–0.1)
BASOPHILS NFR BLD MANUAL: 1 %
BILIRUB SERPL-MCNC: 0.2 MG/DL (ref 0–1.2)
BLOOD EXPIRATION DATE: NORMAL
BUN SERPL-MCNC: 32 MG/DL (ref 6–23)
CALCIUM SERPL-MCNC: 8.7 MG/DL (ref 8.6–10.3)
CHLORIDE SERPL-SCNC: 113 MMOL/L (ref 98–107)
CO2 SERPL-SCNC: 21 MMOL/L (ref 21–32)
CREAT SERPL-MCNC: 2.45 MG/DL (ref 0.5–1.05)
DACRYOCYTES BLD QL SMEAR: ABNORMAL
DISPENSE STATUS: NORMAL
EOSINOPHIL # BLD MANUAL: 0.12 X10*3/UL (ref 0–0.7)
EOSINOPHIL NFR BLD MANUAL: 2 %
ERYTHROCYTE [DISTWIDTH] IN BLOOD BY AUTOMATED COUNT: 16.7 % (ref 11.5–14.5)
GFR SERPL CREATININE-BSD FRML MDRD: 23 ML/MIN/1.73M*2
GIANT PLATELETS BLD QL SMEAR: ABNORMAL
GLUCOSE SERPL-MCNC: 108 MG/DL (ref 74–99)
HCT VFR BLD AUTO: 23.1 % (ref 36–46)
HGB BLD-MCNC: 6.8 G/DL (ref 12–16)
IMM GRANULOCYTES # BLD AUTO: 0.42 X10*3/UL (ref 0–0.7)
IMM GRANULOCYTES NFR BLD AUTO: 6.8 % (ref 0–0.9)
INR PPP: 1.8 (ref 0.9–1.1)
LYMPHOCYTES # BLD MANUAL: 1.05 X10*3/UL (ref 1.2–4.8)
LYMPHOCYTES NFR BLD MANUAL: 17 %
MCH RBC QN AUTO: 30.4 PG (ref 26–34)
MCHC RBC AUTO-ENTMCNC: 29.4 G/DL (ref 32–36)
MCV RBC AUTO: 103 FL (ref 80–100)
METAMYELOCYTES # BLD MANUAL: 0.06 X10*3/UL
METAMYELOCYTES NFR BLD MANUAL: 1 %
MONOCYTES # BLD MANUAL: 0.37 X10*3/UL (ref 0.1–1)
MONOCYTES NFR BLD MANUAL: 6 %
MYELOCYTES # BLD MANUAL: 0.12 X10*3/UL
MYELOCYTES NFR BLD MANUAL: 2 %
NEUTROPHILS # BLD MANUAL: 4.41 X10*3/UL (ref 1.2–7.7)
NEUTS BAND # BLD MANUAL: 0.19 X10*3/UL (ref 0–0.7)
NEUTS BAND NFR BLD MANUAL: 3 %
NEUTS SEG # BLD MANUAL: 4.22 X10*3/UL (ref 1.2–7)
NEUTS SEG NFR BLD MANUAL: 68 %
NRBC BLD-RTO: 1.9 /100 WBCS (ref 0–0)
OVALOCYTES BLD QL SMEAR: ABNORMAL
PLATELET # BLD AUTO: 177 X10*3/UL (ref 150–450)
PMV BLD AUTO: 10.6 FL (ref 7.5–11.5)
POLYCHROMASIA BLD QL SMEAR: ABNORMAL
POTASSIUM SERPL-SCNC: 4.3 MMOL/L (ref 3.5–5.3)
PRODUCT BLOOD TYPE: 5100
PRODUCT CODE: NORMAL
PROT SERPL-MCNC: 5.8 G/DL (ref 6.4–8.2)
PROTHROMBIN TIME: 20.1 SECONDS (ref 9.8–12.8)
RBC # BLD AUTO: 2.24 X10*6/UL (ref 4–5.2)
RBC MORPH BLD: ABNORMAL
RH FACTOR (ANTIGEN D): NORMAL
RH FACTOR (ANTIGEN D): NORMAL
SODIUM SERPL-SCNC: 141 MMOL/L (ref 136–145)
TOTAL CELLS COUNTED BLD: 100
UNIT ABO: NORMAL
UNIT NUMBER: NORMAL
UNIT RH: NORMAL
UNIT VOLUME: 350
WBC # BLD AUTO: 6.2 X10*3/UL (ref 4.4–11.3)
XM INTEP: NORMAL

## 2023-10-24 PROCEDURE — 85610 PROTHROMBIN TIME: CPT | Performed by: NURSE PRACTITIONER

## 2023-10-24 PROCEDURE — 85027 COMPLETE CBC AUTOMATED: CPT | Performed by: NURSE PRACTITIONER

## 2023-10-24 PROCEDURE — 99284 EMERGENCY DEPT VISIT MOD MDM: CPT

## 2023-10-24 PROCEDURE — 86850 RBC ANTIBODY SCREEN: CPT | Performed by: NURSE PRACTITIONER

## 2023-10-24 PROCEDURE — 85007 BL SMEAR W/DIFF WBC COUNT: CPT | Performed by: NURSE PRACTITIONER

## 2023-10-24 PROCEDURE — 71045 X-RAY EXAM CHEST 1 VIEW: CPT | Performed by: RADIOLOGY

## 2023-10-24 PROCEDURE — 80053 COMPREHEN METABOLIC PANEL: CPT | Performed by: NURSE PRACTITIONER

## 2023-10-24 PROCEDURE — 71045 X-RAY EXAM CHEST 1 VIEW: CPT | Mod: FY

## 2023-10-24 PROCEDURE — 36415 COLL VENOUS BLD VENIPUNCTURE: CPT | Performed by: NURSE PRACTITIONER

## 2023-10-24 PROCEDURE — 36430 TRANSFUSION BLD/BLD COMPNT: CPT

## 2023-10-24 PROCEDURE — P9016 RBC LEUKOCYTES REDUCED: HCPCS

## 2023-10-24 PROCEDURE — 86920 COMPATIBILITY TEST SPIN: CPT

## 2023-10-24 ASSESSMENT — COLUMBIA-SUICIDE SEVERITY RATING SCALE - C-SSRS
2. HAVE YOU ACTUALLY HAD ANY THOUGHTS OF KILLING YOURSELF?: NO
6. HAVE YOU EVER DONE ANYTHING, STARTED TO DO ANYTHING, OR PREPARED TO DO ANYTHING TO END YOUR LIFE?: NO
1. IN THE PAST MONTH, HAVE YOU WISHED YOU WERE DEAD OR WISHED YOU COULD GO TO SLEEP AND NOT WAKE UP?: NO

## 2023-10-24 ASSESSMENT — LIFESTYLE VARIABLES
EVER FELT BAD OR GUILTY ABOUT YOUR DRINKING: NO
REASON UNABLE TO ASSESS: NO
HAVE YOU EVER FELT YOU SHOULD CUT DOWN ON YOUR DRINKING: NO
HAVE PEOPLE ANNOYED YOU BY CRITICIZING YOUR DRINKING: NO
EVER HAD A DRINK FIRST THING IN THE MORNING TO STEADY YOUR NERVES TO GET RID OF A HANGOVER: NO

## 2023-10-24 NOTE — ED PROVIDER NOTES
Limitations to History: None     HPI:      Janene Pinedo is a 53 y.o. is a  female with significant past medical history for hypertension, CKD stage V, polyneuropathy, GERD, hyperlipidemia and PE on Coumadin who was a full code presenting to ED today from [facility] via EMS for low hemoglobin.  Hemoglobin is typically around 7-8, blood work yesterday revealed a hemoglobin of 6.5 platelets within normal limits.  Patient has no complaints, denies fever/chills, cough/cold symptoms, weakness, chest pain, shortness of breath, nausea/vomiting, abdominal pain, urinary symptoms, change in bowel habits or any other complaints.  No smoking, EtOH or drug use.  PCP is Dr. Rivera.     Additional History Obtained from: [facility] reports that hemoglobin is dropping, infusion center was unable to perform iron infusion today, iron and Epogen not working.    ------------------------------------------------------------------------------------------------------------------------------------------    VS: As documented in the triage note and EMR flowsheet from this visit were reviewed.    Physical Exam:  Gen: 53-year-old  female, nontoxic looking.  Pleasant and interactive.  Alert and oriented x3.  Well-nourished and hydrated.  Head/Neck: NCAT, neck w/ FROM  Eyes: EOMI, PERRL, anicteric sclerae, conjunctiva slightly pale.  Ears: TMs clear b/l without sign of infection  Nose: Nares patent w/o rhinorrhea  Mouth:  MMM, no OP lesions noted  Heart: RRR no MRG  Lungs: CTA b/l no RRW, no increased work of breathing  Abdomen: soft, NT, ND, no HSM, no palpable masses  Musculoskeletal: Movement of extremities x4.  MSPs intact.  Skin intact.  No warmth, erythema, ecchymosis or deformity.  Pulses full and equal.  Neurologic: Alert, symmetrical facies, phonates clearly, moves all extremities equally, responsive to touch, ambulates normally   Skin: Slightly pale, warm and  dry.        ------------------------------------------------------------------------------------------------------------------------------------------    Medical Decision Making:     ED Course as of 10/24/23 1700   Tue Oct 24, 2023   1536 53 y.o. is a  female with significant past medical history for hypertension, CKD stage V, polyneuropathy, GERD, hyperlipidemia and PE on Coumadin who was a full code pleasant Holland Andre is evaluated at the bedside for low hemoglobin of 6.5 on yesterday's lab work.  Denies black or bloody stools, on Coumadin.  Vital signs within normal limits.  Afebrile.  Lungs clear, abdomen soft nontender, skin and conjunctiva slightly pale, no complaints.  IV established, basic labs with type and screen, EKG and chest x-ray will be performed. [SB]   1620 Chest x-ray no acute cardiopulmonary process.  EKG sinus rhythm as below. [SB]   1630 Laboratory studies reviewed, no leukocytosis, hemoglobin is 6.8, patient was transfused with 1 unit and discharged back to the facility.  INR slightly subtherapeutic at 1.8.  CMP still pending.  Dr. Conklin will follow patient and discharged back to SNF. [SB]      ED Course User Index  [SB] Beba Benoit, APRN-CNP         Diagnoses as of 10/24/23 1700   Anemia, unspecified type       EKG interpreted by  Dr. Conklin, 1541, sinus rhythm at a rate of 83 with PVCs.  Borderline T wave abnormalities anterior leads.  No ST segment depression or elevation consistent with ischemia or infarction.    Chronic Medical Conditions Significantly Affecting Care: None    External Records Reviewed: I reviewed recent and relevant outside records including: SNF paperwork    Discussion of Management with Other Providers: Seen and evaluated ED attending physician, Dr. Weir, he agrees with treatment plan, disposition of the patient.    I discussed the patient/results with: [admitting team, consultant, radiologist, social work, EPAT, case management, PT/OT, RT, PCP,  etc.]      Addendum by Carmella: Patient is mildly anemic at 6.8.  At this point, given her slowly decreasing hemogram, I do feel that she would benefit from transfusion.  Patient is agreeable and was consented.  I do feel that she is safe for discharge back to her nursing facility.     Abilio Conklin MD  10/24/23 1830

## 2023-11-01 ENCOUNTER — HOSPITAL ENCOUNTER (OUTPATIENT)
Dept: CARDIOLOGY | Facility: HOSPITAL | Age: 53
Discharge: HOME | End: 2023-11-01
Payer: MEDICAID

## 2023-11-01 LAB
ATRIAL RATE: 84 BPM
P AXIS: 51 DEGREES
PR INTERVAL: 138 MS
Q ONSET: 255 MS
QRS COUNT: 14 BEATS
QRS DURATION: 90 MS
QT INTERVAL: 362 MS
QTC CALCULATION(BAZETT): 426 MS
QTC FREDERICIA: 403 MS
R AXIS: 16 DEGREES
T AXIS: 33 DEGREES
T OFFSET: 436 MS
VENTRICULAR RATE: 83 BPM

## 2023-11-01 PROCEDURE — 93005 ELECTROCARDIOGRAM TRACING: CPT

## 2023-11-10 ENCOUNTER — NURSING HOME VISIT (OUTPATIENT)
Dept: POST ACUTE CARE | Facility: EXTERNAL LOCATION | Age: 53
End: 2023-11-10
Payer: MEDICAID

## 2023-11-10 DIAGNOSIS — D50.8 OTHER IRON DEFICIENCY ANEMIA: ICD-10-CM

## 2023-11-10 DIAGNOSIS — G12.29: ICD-10-CM

## 2023-11-10 DIAGNOSIS — R54 AGE-RELATED PHYSICAL DEBILITY: ICD-10-CM

## 2023-11-10 DIAGNOSIS — N18.5 CKD (CHRONIC KIDNEY DISEASE), STAGE V (MULTI): Primary | ICD-10-CM

## 2023-11-10 DIAGNOSIS — J45.40 MODERATE PERSISTENT ASTHMA, UNSPECIFIED WHETHER COMPLICATED (HHS-HCC): ICD-10-CM

## 2023-11-10 PROCEDURE — 99308 SBSQ NF CARE LOW MDM 20: CPT | Performed by: FAMILY MEDICINE

## 2023-11-10 NOTE — LETTER
Patient: Janene Pinedo  : 1970    Encounter Date: 11/10/2023    Subjective  Patient ID: Janene Pinedo is a 53 y.o. female who is long term resident being seen and evaluated for multiple medical problems.    Anti phospholipid syndrome, anemia, asthma, ckd, upper motor esion          Review of Systems   Reason unable to perform ROS: debility.       Objective  There were no vitals taken for this visit.    Physical Exam  Vitals and nursing note reviewed.   Constitutional:       Appearance: She is ill-appearing.   HENT:      Head: Normocephalic and atraumatic.      Nose: Nose normal.      Mouth/Throat:      Mouth: Mucous membranes are moist.      Pharynx: Oropharynx is clear.   Eyes:      Extraocular Movements: Extraocular movements intact.      Conjunctiva/sclera: Conjunctivae normal.      Pupils: Pupils are equal, round, and reactive to light.   Cardiovascular:      Rate and Rhythm: Normal rate and regular rhythm.      Pulses: Normal pulses.   Pulmonary:      Effort: Pulmonary effort is normal.      Breath sounds: Normal breath sounds.   Abdominal:      General: Bowel sounds are normal.      Palpations: Abdomen is soft.   Musculoskeletal:         General: Tenderness present. Normal range of motion.      Cervical back: Normal range of motion and neck supple.   Skin:     General: Skin is warm and dry.      Capillary Refill: Capillary refill takes less than 2 seconds.      Coloration: Skin is pale.   Neurological:      Mental Status: She is alert. Mental status is at baseline.      Motor: Weakness present.      Gait: Gait abnormal.   Psychiatric:         Mood and Affect: Labile: flat.         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

## 2023-11-10 NOTE — PROGRESS NOTES
Subjective   Patient ID: Janene Pinedo is a 53 y.o. female who is long term resident being seen and evaluated for multiple medical problems.    Anti phospholipid syndrome, anemia, asthma, ckd, upper motor esion          Review of Systems   Reason unable to perform ROS: debility.       Objective   There were no vitals taken for this visit.    Physical Exam  Vitals and nursing note reviewed.   Constitutional:       Appearance: She is ill-appearing.   HENT:      Head: Normocephalic and atraumatic.      Nose: Nose normal.      Mouth/Throat:      Mouth: Mucous membranes are moist.      Pharynx: Oropharynx is clear.   Eyes:      Extraocular Movements: Extraocular movements intact.      Conjunctiva/sclera: Conjunctivae normal.      Pupils: Pupils are equal, round, and reactive to light.   Cardiovascular:      Rate and Rhythm: Normal rate and regular rhythm.      Pulses: Normal pulses.   Pulmonary:      Effort: Pulmonary effort is normal.      Breath sounds: Normal breath sounds.   Abdominal:      General: Bowel sounds are normal.      Palpations: Abdomen is soft.   Musculoskeletal:         General: Tenderness present. Normal range of motion.      Cervical back: Normal range of motion and neck supple.   Skin:     General: Skin is warm and dry.      Capillary Refill: Capillary refill takes less than 2 seconds.      Coloration: Skin is pale.   Neurological:      Mental Status: She is alert. Mental status is at baseline.      Motor: Weakness present.      Gait: Gait abnormal.   Psychiatric:         Mood and Affect: Labile: flat.         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

## 2023-11-14 ENCOUNTER — DOCUMENTATION (OUTPATIENT)
Dept: NEPHROLOGY | Facility: HOSPITAL | Age: 53
End: 2023-11-14
Payer: MEDICAID

## 2023-11-14 ENCOUNTER — HOSPITAL ENCOUNTER (OUTPATIENT)
Dept: DIALYSIS | Facility: HOSPITAL | Age: 53
Setting detail: DIALYSIS SERIES
Discharge: HOME | End: 2023-11-14
Payer: MEDICAID

## 2023-11-14 VITALS
HEART RATE: 96 BPM | WEIGHT: 177.91 LBS | BODY MASS INDEX: 34.75 KG/M2 | DIASTOLIC BLOOD PRESSURE: 71 MMHG | SYSTOLIC BLOOD PRESSURE: 109 MMHG

## 2023-11-14 DIAGNOSIS — N18.4 ANEMIA DUE TO STAGE 4 CHRONIC KIDNEY DISEASE (MULTI): Primary | ICD-10-CM

## 2023-11-14 DIAGNOSIS — N18.4 CHRONIC KIDNEY DISEASE, STAGE 4 (SEVERE) (MULTI): Primary | ICD-10-CM

## 2023-11-14 DIAGNOSIS — I15.8 OTHER SECONDARY HYPERTENSION: ICD-10-CM

## 2023-11-14 DIAGNOSIS — D63.1 ANEMIA DUE TO STAGE 4 CHRONIC KIDNEY DISEASE (MULTI): Primary | ICD-10-CM

## 2023-11-14 DIAGNOSIS — D63.1 ANEMIA DUE TO STAGE 4 CHRONIC KIDNEY DISEASE (MULTI): ICD-10-CM

## 2023-11-14 DIAGNOSIS — N18.4 ANEMIA DUE TO STAGE 4 CHRONIC KIDNEY DISEASE (MULTI): ICD-10-CM

## 2023-11-14 DIAGNOSIS — N18.4 CHRONIC KIDNEY DISEASE, STAGE 4 (SEVERE) (MULTI): ICD-10-CM

## 2023-11-14 LAB
ALBUMIN SERPL BCP-MCNC: 3.5 G/DL (ref 3.4–5)
ANION GAP SERPL CALC-SCNC: 12 MMOL/L (ref 10–20)
BUN SERPL-MCNC: 49 MG/DL (ref 6–23)
CALCIUM SERPL-MCNC: 8.7 MG/DL (ref 8.6–10.6)
CHLORIDE SERPL-SCNC: 113 MMOL/L (ref 98–107)
CO2 SERPL-SCNC: 21 MMOL/L (ref 21–32)
CREAT SERPL-MCNC: 2.74 MG/DL (ref 0.5–1.05)
ERYTHROCYTE [DISTWIDTH] IN BLOOD BY AUTOMATED COUNT: 14.4 % (ref 11.5–14.5)
GFR SERPL CREATININE-BSD FRML MDRD: 20 ML/MIN/1.73M*2
GLUCOSE SERPL-MCNC: 115 MG/DL (ref 74–99)
HCT VFR BLD AUTO: 26.9 % (ref 36–46)
HGB BLD-MCNC: 9 G/DL (ref 12–16)
MCH RBC QN AUTO: 31.5 PG (ref 26–34)
MCHC RBC AUTO-ENTMCNC: 33.5 G/DL (ref 32–36)
MCV RBC AUTO: 94 FL (ref 80–100)
NRBC BLD-RTO: 0 /100 WBCS (ref 0–0)
PHOSPHATE SERPL-MCNC: 5.6 MG/DL (ref 2.5–4.9)
PLATELET # BLD AUTO: 167 X10*3/UL (ref 150–450)
POTASSIUM SERPL-SCNC: 4.4 MMOL/L (ref 3.5–5.3)
RBC # BLD AUTO: 2.86 X10*6/UL (ref 4–5.2)
SODIUM SERPL-SCNC: 142 MMOL/L (ref 136–145)
WBC # BLD AUTO: 6.3 X10*3/UL (ref 4.4–11.3)

## 2023-11-14 PROCEDURE — 99214 OFFICE O/P EST MOD 30 MIN: CPT | Performed by: NURSE PRACTITIONER

## 2023-11-14 PROCEDURE — 80069 RENAL FUNCTION PANEL: CPT | Performed by: NURSE PRACTITIONER

## 2023-11-14 PROCEDURE — 36415 COLL VENOUS BLD VENIPUNCTURE: CPT | Performed by: INTERNAL MEDICINE

## 2023-11-14 PROCEDURE — 36415 COLL VENOUS BLD VENIPUNCTURE: CPT

## 2023-11-14 PROCEDURE — 2500000004 HC RX 250 GENERAL PHARMACY W/ HCPCS (ALT 636 FOR OP/ED): Mod: JZ,SE

## 2023-11-14 PROCEDURE — 96375 TX/PRO/DX INJ NEW DRUG ADDON: CPT

## 2023-11-14 PROCEDURE — 36415 COLL VENOUS BLD VENIPUNCTURE: CPT | Performed by: NURSE PRACTITIONER

## 2023-11-14 PROCEDURE — 96372 THER/PROPH/DIAG INJ SC/IM: CPT

## 2023-11-14 PROCEDURE — 85027 COMPLETE CBC AUTOMATED: CPT | Performed by: INTERNAL MEDICINE

## 2023-11-14 RX ORDER — ALBUTEROL SULFATE 0.83 MG/ML
3 SOLUTION RESPIRATORY (INHALATION) AS NEEDED
Status: CANCELLED | OUTPATIENT
Start: 2023-11-16

## 2023-11-14 RX ORDER — EPINEPHRINE 0.3 MG/.3ML
0.3 INJECTION SUBCUTANEOUS EVERY 5 MIN PRN
Status: CANCELLED | OUTPATIENT
Start: 2023-11-16

## 2023-11-14 RX ORDER — FAMOTIDINE 10 MG/ML
20 INJECTION INTRAVENOUS ONCE AS NEEDED
Status: CANCELLED | OUTPATIENT
Start: 2023-11-16

## 2023-11-14 RX ADMIN — DARBEPOETIN ALFA 150 MCG: 150 INJECTION, SOLUTION INTRAVENOUS; SUBCUTANEOUS at 14:47

## 2023-11-14 ASSESSMENT — ENCOUNTER SYMPTOMS
ROS SKIN COMMENTS: DRY SKIN
HEMATURIA: 0
DYSURIA: 0
VOMITING: 0
SHORTNESS OF BREATH: 0
CHEST TIGHTNESS: 0
PALPITATIONS: 0
NAUSEA: 0
DIARRHEA: 0
FLANK PAIN: 1
CONSTIPATION: 0
DIZZINESS: 0
ABDOMINAL PAIN: 0
LIGHT-HEADEDNESS: 0
DIFFICULTY URINATING: 0

## 2023-11-14 NOTE — PROGRESS NOTES
Subjective   Patient ID: Janene Pinedo is a 53 y.o. female who presents for Chronic Kidney Disease.  Chronic Renal Failure  Associated Symptoms:     Positive: flank pain      Negative: no abdominal pain, no chest pain, no diarrhea, no dizziness, no dysuria, no hematuria, no nausea, no rash, no shortness of breath and no vomiting      Pt with CKD stage 4 d/t membranous GN last seen by Dr Sanders in Oct. Went to ED for blood transfusion to ED for Hgb 6.5. did not feel poorly before and felt no differently after the transfusion. No new symptoms or c/o. Ongoing bilateral flank pain for ~1.5months that is no better or worse. Feels like her mid back is being pulled from both sides. Feels like deep muscle pain but did not do any activity to cause it. Other than walking around nursing home does not do any activity. No PT there as she was told she does not need it. Uses walker due to legs giving out without it. Also c/o chronic fatigue for several months. Tries not to nap during day but will fall asleep easily in chair. Appetite is not good but has improved recently   Last labs done 10/24 with Cr 2.45 and eGFR 23ml/min. Overall unchanged over past year with some fluctuations/SHANE (?) with relative return to baseline. CBC with hgb below 7 on 10/24. To ED for transfusion   Review of Systems   Constitutional:         Always tired for several months  Not much appetite for several months.  Eating a bit better lately plus glucerna daily  Everything tastes bland to her   Respiratory:  Negative for chest tightness and shortness of breath.    Cardiovascular:  Negative for chest pain and palpitations.        Had leg swelling but resolved with support stockings   Gastrointestinal:  Negative for abdominal pain, constipation, diarrhea, nausea and vomiting.   Genitourinary:  Positive for flank pain. Negative for difficulty urinating, dysuria and hematuria.        +nocturia several times but not new   Skin:  Negative for rash.        Dry  skin   Neurological:  Negative for dizziness and light-headedness.       Objective   Physical Exam  Constitutional:       Appearance: Normal appearance.   HENT:      Mouth/Throat:      Mouth: Mucous membranes are moist.   Cardiovascular:      Rate and Rhythm: Normal rate and regular rhythm.      Heart sounds: Normal heart sounds.   Pulmonary:      Effort: Pulmonary effort is normal.      Breath sounds: Normal breath sounds.   Abdominal:      Palpations: Abdomen is soft.   Musculoskeletal:      Right lower leg: No edema.      Left lower leg: No edema.   Skin:     General: Skin is warm.   Neurological:      General: No focal deficit present.      Mental Status: She is alert and oriented to person, place, and time.   Psychiatric:         Mood and Affect: Mood normal.         Behavior: Behavior normal.           Assessment/Plan      CKD stage 4 d/t MGN with labs relatively stable over past year with some fluctuation. Recent labs with Cr 2.45 and dGFR 23ml/min. Has ongoing symptoms of fatigue, poor appetite and flank pain that are not likely to be uremic in nature. Had ESRD options education last Dec when pt [facility] when indicated. Will see Dr aSnders for appt in Feb. Will return to [facility] in one month  Repeat labs with Cr 2.74 and eGFR 20ml/min. K+ and bicarb wnl; Ca+wnl, phos 5.6    HTN: BP well controlled today, lower than previous clinic readings though asymptomatic  No med changes at this time   Appears euvolemic on exam     Anemia: Receiving monthly aranesp. Transfused in ED in Oct for low hgb   Repeat labs today with hgb 9.   Continue monthly [facility] visits

## 2023-11-14 NOTE — PROGRESS NOTES
..How is your energy?  Energy so/so still sleeping   How is your appetite? Been eating beter than normal  Any nausea and vomiting after eating?everything is bland tasting/ metal taste, drinking Ensure for lunch denies nausea after eating  Any diarrhea or constipation? NO  Any SOB? No  Any Edema in lower extremities? NO  Any questions regarding your Kidney disease? NO  DO you have a Dialysis accesses? NO     Received blood transfusion last month

## 2023-11-16 ENCOUNTER — APPOINTMENT (OUTPATIENT)
Dept: PRIMARY CARE | Facility: CLINIC | Age: 53
End: 2023-11-16
Payer: MEDICAID

## 2023-12-07 ENCOUNTER — OFFICE VISIT (OUTPATIENT)
Dept: PRIMARY CARE | Facility: CLINIC | Age: 53
End: 2023-12-07
Payer: MEDICAID

## 2023-12-07 ENCOUNTER — LAB (OUTPATIENT)
Dept: LAB | Facility: LAB | Age: 53
End: 2023-12-07
Payer: MEDICAID

## 2023-12-07 VITALS
BODY MASS INDEX: 35.34 KG/M2 | DIASTOLIC BLOOD PRESSURE: 84 MMHG | HEART RATE: 88 BPM | WEIGHT: 180 LBS | OXYGEN SATURATION: 98 % | SYSTOLIC BLOOD PRESSURE: 124 MMHG | HEIGHT: 60 IN

## 2023-12-07 DIAGNOSIS — M17.12 PRIMARY OSTEOARTHRITIS OF LEFT KNEE: ICD-10-CM

## 2023-12-07 DIAGNOSIS — Z00.00 HEALTHCARE MAINTENANCE: Primary | ICD-10-CM

## 2023-12-07 DIAGNOSIS — Z00.00 HEALTHCARE MAINTENANCE: ICD-10-CM

## 2023-12-07 DIAGNOSIS — D63.1 ANEMIA DUE TO STAGE 4 CHRONIC KIDNEY DISEASE (MULTI): ICD-10-CM

## 2023-12-07 DIAGNOSIS — N18.4 ANEMIA DUE TO STAGE 4 CHRONIC KIDNEY DISEASE (MULTI): ICD-10-CM

## 2023-12-07 LAB
25(OH)D3 SERPL-MCNC: 18 NG/ML (ref 30–100)
ALBUMIN SERPL BCP-MCNC: 3.8 G/DL (ref 3.4–5)
ALP SERPL-CCNC: 110 U/L (ref 33–110)
ALT SERPL W P-5'-P-CCNC: 7 U/L (ref 7–45)
ANION GAP SERPL CALC-SCNC: 18 MMOL/L (ref 10–20)
AST SERPL W P-5'-P-CCNC: 11 U/L (ref 9–39)
BASOPHILS # BLD AUTO: 0.05 X10*3/UL (ref 0–0.1)
BASOPHILS NFR BLD AUTO: 0.7 %
BILIRUB SERPL-MCNC: 0.2 MG/DL (ref 0–1.2)
BUN SERPL-MCNC: 41 MG/DL (ref 6–23)
CALCIUM SERPL-MCNC: 8.9 MG/DL (ref 8.6–10.6)
CHLORIDE SERPL-SCNC: 111 MMOL/L (ref 98–107)
CHOLEST SERPL-MCNC: 303 MG/DL (ref 0–199)
CHOLESTEROL/HDL RATIO: 6.9
CO2 SERPL-SCNC: 19 MMOL/L (ref 21–32)
CREAT SERPL-MCNC: 2.19 MG/DL (ref 0.5–1.05)
EOSINOPHIL # BLD AUTO: 0.18 X10*3/UL (ref 0–0.7)
EOSINOPHIL NFR BLD AUTO: 2.6 %
ERYTHROCYTE [DISTWIDTH] IN BLOOD BY AUTOMATED COUNT: 14.3 % (ref 11.5–14.5)
EST. AVERAGE GLUCOSE BLD GHB EST-MCNC: 91 MG/DL
GFR SERPL CREATININE-BSD FRML MDRD: 27 ML/MIN/1.73M*2
GLUCOSE SERPL-MCNC: 103 MG/DL (ref 74–99)
HBA1C MFR BLD: 4.8 %
HCT VFR BLD AUTO: 32.4 % (ref 36–46)
HDLC SERPL-MCNC: 44.2 MG/DL
HGB BLD-MCNC: 10.4 G/DL (ref 12–16)
IMM GRANULOCYTES # BLD AUTO: 0.05 X10*3/UL (ref 0–0.7)
IMM GRANULOCYTES NFR BLD AUTO: 0.7 % (ref 0–0.9)
LDLC SERPL CALC-MCNC: ABNORMAL MG/DL
LYMPHOCYTES # BLD AUTO: 1.22 X10*3/UL (ref 1.2–4.8)
LYMPHOCYTES NFR BLD AUTO: 17.6 %
MCH RBC QN AUTO: 31.3 PG (ref 26–34)
MCHC RBC AUTO-ENTMCNC: 32.1 G/DL (ref 32–36)
MCV RBC AUTO: 98 FL (ref 80–100)
MONOCYTES # BLD AUTO: 0.45 X10*3/UL (ref 0.1–1)
MONOCYTES NFR BLD AUTO: 6.5 %
NEUTROPHILS # BLD AUTO: 4.98 X10*3/UL (ref 1.2–7.7)
NEUTROPHILS NFR BLD AUTO: 71.9 %
NON HDL CHOLESTEROL: 259 MG/DL (ref 0–149)
NRBC BLD-RTO: 0 /100 WBCS (ref 0–0)
PLATELET # BLD AUTO: 191 X10*3/UL (ref 150–450)
POTASSIUM SERPL-SCNC: 4.4 MMOL/L (ref 3.5–5.3)
PROT SERPL-MCNC: 6.1 G/DL (ref 6.4–8.2)
RBC # BLD AUTO: 3.32 X10*6/UL (ref 4–5.2)
SODIUM SERPL-SCNC: 144 MMOL/L (ref 136–145)
T4 FREE SERPL-MCNC: 0.8 NG/DL (ref 0.78–1.48)
TRIGL SERPL-MCNC: 595 MG/DL (ref 0–149)
TSH SERPL-ACNC: 1.42 MIU/L (ref 0.44–3.98)
VLDL: ABNORMAL
WBC # BLD AUTO: 6.9 X10*3/UL (ref 4.4–11.3)

## 2023-12-07 PROCEDURE — 3079F DIAST BP 80-89 MM HG: CPT | Performed by: INTERNAL MEDICINE

## 2023-12-07 PROCEDURE — 80053 COMPREHEN METABOLIC PANEL: CPT

## 2023-12-07 PROCEDURE — 84443 ASSAY THYROID STIM HORMONE: CPT

## 2023-12-07 PROCEDURE — 1036F TOBACCO NON-USER: CPT | Performed by: INTERNAL MEDICINE

## 2023-12-07 PROCEDURE — 84439 ASSAY OF FREE THYROXINE: CPT

## 2023-12-07 PROCEDURE — 82306 VITAMIN D 25 HYDROXY: CPT

## 2023-12-07 PROCEDURE — 3074F SYST BP LT 130 MM HG: CPT | Performed by: INTERNAL MEDICINE

## 2023-12-07 PROCEDURE — 83036 HEMOGLOBIN GLYCOSYLATED A1C: CPT

## 2023-12-07 PROCEDURE — 36415 COLL VENOUS BLD VENIPUNCTURE: CPT

## 2023-12-07 PROCEDURE — 99214 OFFICE O/P EST MOD 30 MIN: CPT | Performed by: INTERNAL MEDICINE

## 2023-12-07 PROCEDURE — 80061 LIPID PANEL: CPT

## 2023-12-07 PROCEDURE — 85025 COMPLETE CBC W/AUTO DIFF WBC: CPT

## 2023-12-07 ASSESSMENT — PATIENT HEALTH QUESTIONNAIRE - PHQ9
1. LITTLE INTEREST OR PLEASURE IN DOING THINGS: NOT AT ALL
SUM OF ALL RESPONSES TO PHQ9 QUESTIONS 1 AND 2: 0
2. FEELING DOWN, DEPRESSED OR HOPELESS: NOT AT ALL

## 2023-12-07 NOTE — ADDENDUM NOTE
Encounter addended by: Ashley Banuelos RN on: 12/7/2023 2:41 PM   Actions taken: Clinical Note Signed

## 2023-12-07 NOTE — PROGRESS NOTES
Subjective   Patient ID: Janene Pinedo is a 53 y.o. female who presents for Hospital Follow-up (Blood tranfusion).  HPI        female with a PMH of migraine headaches, mild intermittent asthma, GERD, HTN, hyperlipidemia, asthma, status post bilateral cataract removal, severe nephrotic syndrome, venous insufficiency, and COVID.  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  ER presentation December 26, 2021 nausea vomiting unclear cause CT abdomen negative given Zofran  ER presentation January 5, 2022 status post fall positive COVID hypotension possibly from Covid versus other CT head negative potassium low at 2.2 likely from diarrhea  ER presentation October 2023 hemoglobin 6.5-1 PRBC transfusion    Patient overall feels okay aside from chronic left knee pain arthralgias severe grade 8 out of 10 worse with walking pain gets better with Norco she states the skilled nursing facility would not give her the prescription without a paper prescription from her primary doctor she has had a lot of pain cannot sleep from the pain;  She also has a lot of instability of the left knee with walking it gives out on her sometimes still has not been into orthopedic  Denies paralysis paresthesias joint redness swelling        Health Maintenance:      Colonoscopy: 2022 Cologuard due 2025       Mammogram: 2023      Pelvic/Pap:      Low dose chest CT:      Aorta duplex:      Optho:      Podiatry:        Vaccines:      Prevnar 20:      Prevnar 13:      Pneumovax 23:      Tdap:      Shingrix:      COVID:      Influenza:        ROS:      General: denies fever/chills/weight loss      Head: denies HA/trauma/masses/dizziness      Eyes: denies vision change/loss of vision/blurry vision/diplopia/eye pain      Ears: denies hearing loss/tinnitus/otalgia/otorrhea      Nose: denies nasal drainage/anosmia      Throat: denies dysphagia/odynophagia      Lymphatics: denies lymph node swelling      Cardiac: denies CP/palpitations/orthopnea/PND      Pulmonary: denies dyspnea/cough/wheezing      GI: denies abd pain/n/v/diarrhea/melena/hematochezia/hematemesis      : denies dysuria/hematuria/change frequency      Genital: denies genital discharge/lesions      Skin: denies rashes/lesions/masses      MSK: Chronic severe left knee pain with instability denies weakness/swelling/edema/gait imbalance/pain      Neuro: denies paresthesias/seizures/dysarthria      Psych: denies depression/anxiety/suicidal or homicidal ideations            Objective   /84   Pulse 88   Ht 1.524 m (5')   Wt 81.6 kg (180 lb)   SpO2 98%   BMI 35.15 kg/m²      Physical Exam:     General: AO3, NAD     Head: atraumatic/NC     Eyes: EOMI/PERRLA. Negative APD     Ears: TM pearly gray, EAC clear. No lesions or erythema     Nose: symmetric nares, no discharge     Throat: trachea midline, uvula midline pink mucosa. No thyromegaly     Lymphatics: no cervical/supraclavicular/ant or posterior cervical adenopathy/axillary/inguinal adenopathy     Breast: not examined     Chest: no deformity or tenderness to palpation     Pulm: CTA b/l, no wheeze/rhonchi/rales. nonlabored     Cardiac: RRR +s1s2, no m/r/g.      GI: soft, NT/ND. Normoactive Bsx4. No rebound/guarding.     Rectal: no examined     MSK: Antalgic gait 5/5 strength UE LE. No  "edema/clubbing/cyanosis     Skin: Tattoos no rashes/lesions     Vascular: 2+ palp DP PT radials b/l. Negative carotid bruit     Neuro: CNII-XII intact. No focal deficits. Reflexes 2/4 brachioradialis bicep tricep patellar achilles. Finger to nose intact.     Psych: appropriate mood/affect                    No results found for: \"BMPR1A\", \"CBCDIF\"      Assessment/Plan   Diagnoses and all orders for this visit:  Healthcare maintenance  -     CBC and Auto Differential; Future  -     Comprehensive Metabolic Panel; Future  -     Hemoglobin A1C; Future  -     Lipid Panel; Future  -     Thyroxine, Free; Future  -     TSH with reflex to Free T4 if abnormal; Future  -     Vitamin D 25 hydroxy; Future  Primary osteoarthritis of left knee  Anemia due to stage 4 chronic kidney disease (CMS/HCC)    As we discussed please call follow-up with orthopedics    Paper prescription given for Norco 5/325mg one PO daily     Nephrology recommendations appreciated Kiran BRISCOE follow-up in 2 months    Thank you for making appointment today Janene    Please follow-up 2 months       Abigail Valle MA  "

## 2023-12-08 ENCOUNTER — NURSING HOME VISIT (OUTPATIENT)
Dept: POST ACUTE CARE | Facility: EXTERNAL LOCATION | Age: 53
End: 2023-12-08
Payer: MEDICAID

## 2023-12-08 DIAGNOSIS — R54 AGE-RELATED PHYSICAL DEBILITY: ICD-10-CM

## 2023-12-08 DIAGNOSIS — M21.372 BILATERAL FOOT-DROP: Primary | ICD-10-CM

## 2023-12-08 DIAGNOSIS — D63.1 ANEMIA DUE TO STAGE 4 CHRONIC KIDNEY DISEASE (MULTI): ICD-10-CM

## 2023-12-08 DIAGNOSIS — N18.4 ANEMIA DUE TO STAGE 4 CHRONIC KIDNEY DISEASE (MULTI): ICD-10-CM

## 2023-12-08 DIAGNOSIS — M21.371 BILATERAL FOOT-DROP: Primary | ICD-10-CM

## 2023-12-08 DIAGNOSIS — N18.5 CKD (CHRONIC KIDNEY DISEASE), STAGE V (MULTI): ICD-10-CM

## 2023-12-08 DIAGNOSIS — D68.61 ANTI-PHOSPHOLIPID SYNDROME (MULTI): ICD-10-CM

## 2023-12-08 DIAGNOSIS — J45.40 MODERATE PERSISTENT ASTHMA, UNSPECIFIED WHETHER COMPLICATED (HHS-HCC): ICD-10-CM

## 2023-12-08 PROCEDURE — 99308 SBSQ NF CARE LOW MDM 20: CPT | Performed by: FAMILY MEDICINE

## 2023-12-08 NOTE — PROGRESS NOTES
Subjective   Patient ID: Janene Pinedo is a 53 y.o. female who is long term resident being seen and evaluated for multiple medical problems.    Anti phospholipid syndrome, asthma, debility, ckd, anemia, bilateral foot drop         Review of Systems   Reason unable to perform ROS: debility.       Objective   There were no vitals taken for this visit.    Physical Exam  Vitals and nursing note reviewed.   Constitutional:       Appearance: She is ill-appearing.   HENT:      Head: Normocephalic and atraumatic.      Nose: Nose normal.      Mouth/Throat:      Mouth: Mucous membranes are moist.      Pharynx: Oropharynx is clear.   Eyes:      Extraocular Movements: Extraocular movements intact.      Conjunctiva/sclera: Conjunctivae normal.      Pupils: Pupils are equal, round, and reactive to light.   Cardiovascular:      Rate and Rhythm: Normal rate and regular rhythm.      Pulses: Normal pulses.   Pulmonary:      Effort: Pulmonary effort is normal.      Breath sounds: Normal breath sounds.   Abdominal:      General: Bowel sounds are normal.      Palpations: Abdomen is soft.   Musculoskeletal:         General: Tenderness present. Normal range of motion.      Cervical back: Normal range of motion and neck supple.   Skin:     General: Skin is warm and dry.      Capillary Refill: Capillary refill takes less than 2 seconds.      Coloration: Skin is pale.   Neurological:      Mental Status: She is alert. Mental status is at baseline.      Comments: Bilateral foot drop   Psychiatric:         Mood and Affect: Affect is flat.         Assessment/Plan   Problem List Items Addressed This Visit             ICD-10-CM    Anemia D64.9    Anti-phospholipid syndrome (CMS/HCC) D68.61    Asthma J45.909    Bilateral foot-drop - Primary M21.371, M21.372    CKD (chronic kidney disease), stage V (CMS/HCC) N18.5     Other Visit Diagnoses         Codes    Age-related physical debility     R54             Goals    None

## 2023-12-08 NOTE — LETTER
Patient: Janene Pinedo  : 1970    Encounter Date: 2023    Subjective  Patient ID: Janene Pinedo is a 53 y.o. female who is long term resident being seen and evaluated for multiple medical problems.    Anti phospholipid syndrome, asthma, debility, ckd, anemia, bilateral foot drop         Review of Systems   Reason unable to perform ROS: debility.       Objective  There were no vitals taken for this visit.    Physical Exam  Vitals and nursing note reviewed.   Constitutional:       Appearance: She is ill-appearing.   HENT:      Head: Normocephalic and atraumatic.      Nose: Nose normal.      Mouth/Throat:      Mouth: Mucous membranes are moist.      Pharynx: Oropharynx is clear.   Eyes:      Extraocular Movements: Extraocular movements intact.      Conjunctiva/sclera: Conjunctivae normal.      Pupils: Pupils are equal, round, and reactive to light.   Cardiovascular:      Rate and Rhythm: Normal rate and regular rhythm.      Pulses: Normal pulses.   Pulmonary:      Effort: Pulmonary effort is normal.      Breath sounds: Normal breath sounds.   Abdominal:      General: Bowel sounds are normal.      Palpations: Abdomen is soft.   Musculoskeletal:         General: Tenderness present. Normal range of motion.      Cervical back: Normal range of motion and neck supple.   Skin:     General: Skin is warm and dry.      Capillary Refill: Capillary refill takes less than 2 seconds.      Coloration: Skin is pale.   Neurological:      Mental Status: She is alert. Mental status is at baseline.      Comments: Bilateral foot drop   Psychiatric:         Mood and Affect: Affect is flat.         Assessment/Plan  Problem List Items Addressed This Visit             ICD-10-CM    Anemia D64.9    Anti-phospholipid syndrome (CMS/HCC) D68.61    Asthma J45.909    Bilateral foot-drop - Primary M21.371, M21.372    CKD (chronic kidney disease), stage V (CMS/HCC) N18.5     Other Visit Diagnoses         Codes    Age-related physical  debility     R54             Goals    None           Electronically Signed By: Kyra Rivera MD   12/8/23 11:43 AM

## 2023-12-12 ENCOUNTER — HOSPITAL ENCOUNTER (OUTPATIENT)
Dept: DIALYSIS | Facility: HOSPITAL | Age: 53
Setting detail: DIALYSIS SERIES
Discharge: HOME | End: 2023-12-12
Payer: MEDICAID

## 2023-12-12 VITALS
HEART RATE: 99 BPM | WEIGHT: 184.97 LBS | BODY MASS INDEX: 36.12 KG/M2 | SYSTOLIC BLOOD PRESSURE: 123 MMHG | DIASTOLIC BLOOD PRESSURE: 79 MMHG

## 2023-12-12 DIAGNOSIS — N18.4 CHRONIC KIDNEY DISEASE, STAGE 4 (SEVERE) (MULTI): ICD-10-CM

## 2023-12-12 DIAGNOSIS — D63.1 ANEMIA DUE TO STAGE 4 CHRONIC KIDNEY DISEASE (MULTI): Primary | ICD-10-CM

## 2023-12-12 DIAGNOSIS — N18.4 ANEMIA DUE TO STAGE 4 CHRONIC KIDNEY DISEASE (MULTI): Primary | ICD-10-CM

## 2023-12-12 LAB
ALBUMIN SERPL BCP-MCNC: 3.7 G/DL (ref 3.4–5)
ANION GAP SERPL CALC-SCNC: 15 MMOL/L (ref 10–20)
BUN SERPL-MCNC: 34 MG/DL (ref 6–23)
CALCIUM SERPL-MCNC: 8.7 MG/DL (ref 8.6–10.6)
CHLORIDE SERPL-SCNC: 113 MMOL/L (ref 98–107)
CO2 SERPL-SCNC: 19 MMOL/L (ref 21–32)
CREAT SERPL-MCNC: 2.12 MG/DL (ref 0.5–1.05)
ERYTHROCYTE [DISTWIDTH] IN BLOOD BY AUTOMATED COUNT: 14.2 % (ref 11.5–14.5)
GFR SERPL CREATININE-BSD FRML MDRD: 28 ML/MIN/1.73M*2
GLUCOSE SERPL-MCNC: 114 MG/DL (ref 74–99)
HCT VFR BLD AUTO: 33.3 % (ref 36–46)
HGB BLD-MCNC: 10.7 G/DL (ref 12–16)
MCH RBC QN AUTO: 32.4 PG (ref 26–34)
MCHC RBC AUTO-ENTMCNC: 32.1 G/DL (ref 32–36)
MCV RBC AUTO: 101 FL (ref 80–100)
NRBC BLD-RTO: 0 /100 WBCS (ref 0–0)
PHOSPHATE SERPL-MCNC: 5.1 MG/DL (ref 2.5–4.9)
PLATELET # BLD AUTO: 173 X10*3/UL (ref 150–450)
POTASSIUM SERPL-SCNC: 4.2 MMOL/L (ref 3.5–5.3)
RBC # BLD AUTO: 3.3 X10*6/UL (ref 4–5.2)
SODIUM SERPL-SCNC: 143 MMOL/L (ref 136–145)
WBC # BLD AUTO: 6.6 X10*3/UL (ref 4.4–11.3)

## 2023-12-12 PROCEDURE — 85027 COMPLETE CBC AUTOMATED: CPT | Performed by: NURSE PRACTITIONER

## 2023-12-12 PROCEDURE — 36415 COLL VENOUS BLD VENIPUNCTURE: CPT | Performed by: NURSE PRACTITIONER

## 2023-12-12 PROCEDURE — 96374 THER/PROPH/DIAG INJ IV PUSH: CPT

## 2023-12-12 PROCEDURE — 36415 COLL VENOUS BLD VENIPUNCTURE: CPT

## 2023-12-12 PROCEDURE — 84100 ASSAY OF PHOSPHORUS: CPT | Performed by: NURSE PRACTITIONER

## 2023-12-12 PROCEDURE — 2500000004 HC RX 250 GENERAL PHARMACY W/ HCPCS (ALT 636 FOR OP/ED): Mod: JZ,SE

## 2023-12-12 RX ORDER — EPINEPHRINE 0.3 MG/.3ML
0.3 INJECTION SUBCUTANEOUS EVERY 5 MIN PRN
Status: CANCELLED | OUTPATIENT
Start: 2023-12-14

## 2023-12-12 RX ORDER — ALBUTEROL SULFATE 0.83 MG/ML
3 SOLUTION RESPIRATORY (INHALATION) AS NEEDED
Status: CANCELLED | OUTPATIENT
Start: 2023-12-14

## 2023-12-12 RX ORDER — FAMOTIDINE 10 MG/ML
20 INJECTION INTRAVENOUS ONCE AS NEEDED
Status: CANCELLED | OUTPATIENT
Start: 2023-12-14

## 2023-12-12 RX ADMIN — DARBEPOETIN ALFA 150 MCG: 150 INJECTION, SOLUTION INTRAVENOUS; SUBCUTANEOUS at 14:42

## 2023-12-12 NOTE — PROGRESS NOTES
"How is your energy?\"Fair\". Patient takes naps during the day.  How is your appetite? Appetite is good, even though has metallic taste in mouth.   Any nausea and vomiting after eating? No  Any diarrhea or constipation? No  Any SOB?No   Any Edema in lower extremities? No    Patient states no changes from last visit. Feels well, besides fatigue. Eating well. Weight is up. Patient states metallic taste in mouth started about 1 month ago. Will inform MD.    Vitals:    12/12/23 1428   Weight: 83.9 kg (184 lb 15.5 oz)      Vitals:    12/12/23 1433   BP: 123/79   Pulse: 99      "

## 2023-12-15 DIAGNOSIS — E78.5 HYPERLIPIDEMIA, UNSPECIFIED HYPERLIPIDEMIA TYPE: ICD-10-CM

## 2023-12-15 DIAGNOSIS — E55.9 VITAMIN D DEFICIENCY: Primary | ICD-10-CM

## 2023-12-15 RX ORDER — CHOLECALCIFEROL (VITAMIN D3) 25 MCG
2000 TABLET ORAL DAILY
Qty: 180 TABLET | Refills: 1 | Status: SHIPPED | OUTPATIENT
Start: 2023-12-15 | End: 2024-03-14

## 2023-12-15 RX ORDER — PRAVASTATIN SODIUM 40 MG/1
40 TABLET ORAL DAILY
Qty: 90 TABLET | Refills: 1 | Status: SHIPPED | OUTPATIENT
Start: 2023-12-15 | End: 2024-03-14

## 2023-12-19 ENCOUNTER — HOSPITAL ENCOUNTER (OUTPATIENT)
Dept: CARDIOLOGY | Facility: HOSPITAL | Age: 53
Discharge: HOME | End: 2023-12-19
Payer: MEDICAID

## 2023-12-19 LAB
ATRIAL RATE: 88 BPM
P AXIS: 48 DEGREES
PR INTERVAL: 145 MS
Q ONSET: 259 MS
QRS COUNT: 14 BEATS
QRS DURATION: 81 MS
QT INTERVAL: 365 MS
QTC CALCULATION(BAZETT): 442 MS
QTC FREDERICIA: 414 MS
R AXIS: 27 DEGREES
T AXIS: 36 DEGREES
T OFFSET: 442 MS
VENTRICULAR RATE: 88 BPM

## 2023-12-19 PROCEDURE — 93005 ELECTROCARDIOGRAM TRACING: CPT

## 2023-12-21 ENCOUNTER — TELEPHONE (OUTPATIENT)
Dept: PRIMARY CARE | Facility: CLINIC | Age: 53
End: 2023-12-21
Payer: MEDICAID

## 2023-12-21 NOTE — TELEPHONE ENCOUNTER
Spoke with patient and the nurse at the facility she is at - they were given the recommendations and new scripts for her to take.----- Message from Scott Ramos DO sent at 12/15/2023  8:20 PM EST -----  Abigail  Please notify high cholesterol uncontrolled at 303 increase pravastatin to 40 mg a day  CKD stage IV overall stable creatinine improving at 2.19 follow-up with nephrology  Vitamin D low at 18 increased to 2000 units a day vitamin D  Hemoglobin improving at 10.4  Otherwise currently available lab work stable normal  If any further questions or would like an in office result review please schedule follow-up in the next 2 to 4 weeks thank you

## 2024-01-10 ENCOUNTER — APPOINTMENT (OUTPATIENT)
Dept: DIALYSIS | Facility: HOSPITAL | Age: 54
End: 2024-01-10
Payer: MEDICAID

## 2024-01-10 ENCOUNTER — HOSPITAL ENCOUNTER (OUTPATIENT)
Dept: DIALYSIS | Facility: HOSPITAL | Age: 54
Setting detail: DIALYSIS SERIES
Discharge: HOME | End: 2024-01-10
Payer: MEDICAID

## 2024-01-10 VITALS
DIASTOLIC BLOOD PRESSURE: 76 MMHG | SYSTOLIC BLOOD PRESSURE: 113 MMHG | WEIGHT: 191.14 LBS | BODY MASS INDEX: 37.33 KG/M2 | HEART RATE: 99 BPM

## 2024-01-10 DIAGNOSIS — N18.4 ANEMIA DUE TO STAGE 4 CHRONIC KIDNEY DISEASE (MULTI): ICD-10-CM

## 2024-01-10 DIAGNOSIS — N18.4 CHRONIC KIDNEY DISEASE, STAGE 4 (SEVERE) (MULTI): Primary | ICD-10-CM

## 2024-01-10 DIAGNOSIS — D63.1 ANEMIA DUE TO STAGE 4 CHRONIC KIDNEY DISEASE (MULTI): ICD-10-CM

## 2024-01-10 LAB
ALBUMIN SERPL BCP-MCNC: 3.7 G/DL (ref 3.4–5)
ANION GAP SERPL CALC-SCNC: 14 MMOL/L (ref 10–20)
BUN SERPL-MCNC: 31 MG/DL (ref 6–23)
CALCIUM SERPL-MCNC: 8.8 MG/DL (ref 8.6–10.6)
CHLORIDE SERPL-SCNC: 111 MMOL/L (ref 98–107)
CO2 SERPL-SCNC: 20 MMOL/L (ref 21–32)
CREAT SERPL-MCNC: 2.03 MG/DL (ref 0.5–1.05)
EGFRCR SERPLBLD CKD-EPI 2021: 29 ML/MIN/1.73M*2
ERYTHROCYTE [DISTWIDTH] IN BLOOD BY AUTOMATED COUNT: 14.6 % (ref 11.5–14.5)
GLUCOSE SERPL-MCNC: 87 MG/DL (ref 74–99)
HCT VFR BLD AUTO: 33.5 % (ref 36–46)
HGB BLD-MCNC: 10.6 G/DL (ref 12–16)
MCH RBC QN AUTO: 31.2 PG (ref 26–34)
MCHC RBC AUTO-ENTMCNC: 31.6 G/DL (ref 32–36)
MCV RBC AUTO: 99 FL (ref 80–100)
NRBC BLD-RTO: 0 /100 WBCS (ref 0–0)
PHOSPHATE SERPL-MCNC: 4.4 MG/DL (ref 2.5–4.9)
PLATELET # BLD AUTO: 150 X10*3/UL (ref 150–450)
POTASSIUM SERPL-SCNC: 4 MMOL/L (ref 3.5–5.3)
RBC # BLD AUTO: 3.4 X10*6/UL (ref 4–5.2)
SODIUM SERPL-SCNC: 141 MMOL/L (ref 136–145)
WBC # BLD AUTO: 7.6 X10*3/UL (ref 4.4–11.3)

## 2024-01-10 PROCEDURE — 85027 COMPLETE CBC AUTOMATED: CPT | Performed by: NURSE PRACTITIONER

## 2024-01-10 PROCEDURE — 96375 TX/PRO/DX INJ NEW DRUG ADDON: CPT

## 2024-01-10 PROCEDURE — 84520 ASSAY OF UREA NITROGEN: CPT | Performed by: NURSE PRACTITIONER

## 2024-01-10 PROCEDURE — 36415 COLL VENOUS BLD VENIPUNCTURE: CPT

## 2024-01-10 PROCEDURE — 2500000004 HC RX 250 GENERAL PHARMACY W/ HCPCS (ALT 636 FOR OP/ED): Mod: JZ,SE

## 2024-01-10 PROCEDURE — 36415 COLL VENOUS BLD VENIPUNCTURE: CPT | Performed by: NURSE PRACTITIONER

## 2024-01-10 PROCEDURE — 96372 THER/PROPH/DIAG INJ SC/IM: CPT

## 2024-01-10 RX ORDER — ALBUTEROL SULFATE 0.83 MG/ML
3 SOLUTION RESPIRATORY (INHALATION) AS NEEDED
Status: CANCELLED | OUTPATIENT
Start: 2024-02-06

## 2024-01-10 RX ORDER — FAMOTIDINE 10 MG/ML
20 INJECTION INTRAVENOUS ONCE AS NEEDED
Status: CANCELLED | OUTPATIENT
Start: 2024-02-06

## 2024-01-10 RX ORDER — EPINEPHRINE 0.3 MG/.3ML
0.3 INJECTION SUBCUTANEOUS EVERY 5 MIN PRN
Status: CANCELLED | OUTPATIENT
Start: 2024-02-06

## 2024-01-10 RX ADMIN — DARBEPOETIN ALFA 150 MCG: 150 INJECTION, SOLUTION INTRAVENOUS; SUBCUTANEOUS at 10:26

## 2024-01-10 NOTE — PROGRESS NOTES
..How is your energy? Low sleeping all day, been like that for a while   How is your appetite? Good, eating a lot better, things taste like metal about a month ago  Any nausea and vomiting after eating?no  Any diarrhea or constipation?NO  Any SOB? No  Any Edema in lower extremities?NO   Any questions regarding your Kidney disease? No  DO you have a Dialysis accesses? NO,

## 2024-01-31 ENCOUNTER — NURSING HOME VISIT (OUTPATIENT)
Dept: POST ACUTE CARE | Facility: EXTERNAL LOCATION | Age: 54
End: 2024-01-31
Payer: MEDICAID

## 2024-01-31 DIAGNOSIS — R54 AGE-RELATED PHYSICAL DEBILITY: ICD-10-CM

## 2024-01-31 DIAGNOSIS — D68.61 ANTI-PHOSPHOLIPID SYNDROME (MULTI): ICD-10-CM

## 2024-01-31 DIAGNOSIS — G12.29: ICD-10-CM

## 2024-01-31 DIAGNOSIS — M21.372 BILATERAL FOOT-DROP: ICD-10-CM

## 2024-01-31 DIAGNOSIS — J45.40 MODERATE PERSISTENT ASTHMA, UNSPECIFIED WHETHER COMPLICATED (HHS-HCC): ICD-10-CM

## 2024-01-31 DIAGNOSIS — D63.1 ANEMIA DUE TO STAGE 4 CHRONIC KIDNEY DISEASE (MULTI): ICD-10-CM

## 2024-01-31 DIAGNOSIS — D84.9 IMMUNOSUPPRESSED STATUS (MULTI): ICD-10-CM

## 2024-01-31 DIAGNOSIS — M21.371 BILATERAL FOOT-DROP: ICD-10-CM

## 2024-01-31 DIAGNOSIS — E11.22 TYPE 2 DIABETES MELLITUS WITH DIABETIC CHRONIC KIDNEY DISEASE, UNSPECIFIED CKD STAGE, UNSPECIFIED WHETHER LONG TERM INSULIN USE (MULTI): Primary | ICD-10-CM

## 2024-01-31 DIAGNOSIS — N18.4 ANEMIA DUE TO STAGE 4 CHRONIC KIDNEY DISEASE (MULTI): ICD-10-CM

## 2024-01-31 DIAGNOSIS — N18.5 CKD (CHRONIC KIDNEY DISEASE), STAGE V (MULTI): ICD-10-CM

## 2024-01-31 PROCEDURE — 99308 SBSQ NF CARE LOW MDM 20: CPT | Performed by: FAMILY MEDICINE

## 2024-01-31 NOTE — PROGRESS NOTES
Subjective   Patient ID: Janene Pinedo is a 53 y.o. female who is long term resident being seen and evaluated for multiple medical problems.    Anti phospholipid syndrome, anemia, asthma, bilateral foot drop, debility, asthma, anemia, ckd, DM2          Review of Systems   Reason unable to perform ROS: debility.       Objective   There were no vitals taken for this visit.    Physical Exam  Vitals and nursing note reviewed.   Constitutional:       Appearance: She is ill-appearing.   HENT:      Head: Normocephalic and atraumatic.      Nose: Nose normal.      Mouth/Throat:      Mouth: Mucous membranes are moist.      Pharynx: Oropharynx is clear.   Eyes:      Extraocular Movements: Extraocular movements intact.      Conjunctiva/sclera: Conjunctivae normal.      Pupils: Pupils are equal, round, and reactive to light.   Cardiovascular:      Rate and Rhythm: Normal rate and regular rhythm.      Pulses: Normal pulses.   Pulmonary:      Effort: Pulmonary effort is normal.      Breath sounds: Normal breath sounds.   Abdominal:      General: Bowel sounds are normal.      Palpations: Abdomen is soft.   Musculoskeletal:         General: Tenderness present. Normal range of motion.      Cervical back: Normal range of motion and neck supple.   Skin:     General: Skin is warm and dry.      Capillary Refill: Capillary refill takes less than 2 seconds.      Coloration: Skin is pale.   Neurological:      Mental Status: Mental status is at baseline.      Motor: Weakness present.      Gait: Gait abnormal.   Psychiatric:         Attention and Perception: She is inattentive.         Mood and Affect: Affect is flat.         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

## 2024-01-31 NOTE — LETTER
Patient: Janene Pinedo  : 1970    Encounter Date: 2024    Subjective  Patient ID: Janene Pinedo is a 53 y.o. female who is long term resident being seen and evaluated for multiple medical problems.    Anti phospholipid syndrome, anemia, asthma, bilateral foot drop, debility, asthma, anemia, ckd, DM2          Review of Systems   Reason unable to perform ROS: debility.       Objective  There were no vitals taken for this visit.    Physical Exam  Vitals and nursing note reviewed.   Constitutional:       Appearance: She is ill-appearing.   HENT:      Head: Normocephalic and atraumatic.      Nose: Nose normal.      Mouth/Throat:      Mouth: Mucous membranes are moist.      Pharynx: Oropharynx is clear.   Eyes:      Extraocular Movements: Extraocular movements intact.      Conjunctiva/sclera: Conjunctivae normal.      Pupils: Pupils are equal, round, and reactive to light.   Cardiovascular:      Rate and Rhythm: Normal rate and regular rhythm.      Pulses: Normal pulses.   Pulmonary:      Effort: Pulmonary effort is normal.      Breath sounds: Normal breath sounds.   Abdominal:      General: Bowel sounds are normal.      Palpations: Abdomen is soft.   Musculoskeletal:         General: Tenderness present. Normal range of motion.      Cervical back: Normal range of motion and neck supple.   Skin:     General: Skin is warm and dry.      Capillary Refill: Capillary refill takes less than 2 seconds.      Coloration: Skin is pale.   Neurological:      Mental Status: Mental status is at baseline.      Motor: Weakness present.      Gait: Gait abnormal.   Psychiatric:         Attention and Perception: She is inattentive.         Mood and Affect: Affect is flat.         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

## 2024-02-01 ENCOUNTER — APPOINTMENT (OUTPATIENT)
Dept: LAB | Facility: LAB | Age: 54
End: 2024-02-01
Payer: MEDICARE

## 2024-02-01 PROCEDURE — 36415 COLL VENOUS BLD VENIPUNCTURE: CPT | Performed by: INTERNAL MEDICINE

## 2024-02-01 PROCEDURE — 82668 ASSAY OF ERYTHROPOIETIN: CPT | Performed by: INTERNAL MEDICINE

## 2024-02-03 DIAGNOSIS — N18.4 STAGE 4 CHRONIC KIDNEY DISEASE (MULTI): Primary | ICD-10-CM

## 2024-02-03 LAB — EPO SERPL-ACNC: 10 MU/ML (ref 4–27)

## 2024-02-07 ENCOUNTER — OFFICE VISIT (OUTPATIENT)
Dept: NEPHROLOGY | Facility: CLINIC | Age: 54
End: 2024-02-07
Payer: MEDICARE

## 2024-02-07 VITALS
HEART RATE: 98 BPM | TEMPERATURE: 97.8 F | BODY MASS INDEX: 36.91 KG/M2 | DIASTOLIC BLOOD PRESSURE: 79 MMHG | SYSTOLIC BLOOD PRESSURE: 145 MMHG | WEIGHT: 189 LBS

## 2024-02-07 DIAGNOSIS — N18.4 STAGE 4 CHRONIC KIDNEY DISEASE (MULTI): Primary | ICD-10-CM

## 2024-02-07 PROCEDURE — 1036F TOBACCO NON-USER: CPT | Performed by: INTERNAL MEDICINE

## 2024-02-07 PROCEDURE — 99213 OFFICE O/P EST LOW 20 MIN: CPT | Performed by: INTERNAL MEDICINE

## 2024-02-07 PROCEDURE — 4010F ACE/ARB THERAPY RXD/TAKEN: CPT | Performed by: INTERNAL MEDICINE

## 2024-02-07 PROCEDURE — 3078F DIAST BP <80 MM HG: CPT | Performed by: INTERNAL MEDICINE

## 2024-02-07 PROCEDURE — 3077F SYST BP >= 140 MM HG: CPT | Performed by: INTERNAL MEDICINE

## 2024-02-07 PROCEDURE — 3060F POS MICROALBUMINURIA REV: CPT | Performed by: INTERNAL MEDICINE

## 2024-02-07 NOTE — PROGRESS NOTES
Chief Complaint: Follow up CKD    No specific complaints  Denies nausea, vomiting, chest pain, dyspnea  No urinary symptoms    NAD  Sclera AI s inj  MMM, no sores  Deferred secondary to COVID  No edema  No tremor  No rash    CKD stage 4  HTN in clinic today, BP usually well controlled  Proteinuria not recently assessed    Labs this week  Follow home bps and call them in after 5 readings  Labs and follow up in 3 months

## 2024-02-08 ENCOUNTER — NURSING HOME VISIT (OUTPATIENT)
Dept: POST ACUTE CARE | Facility: EXTERNAL LOCATION | Age: 54
End: 2024-02-08
Payer: MEDICARE

## 2024-02-08 DIAGNOSIS — E11.22 TYPE 2 DIABETES MELLITUS WITH DIABETIC CHRONIC KIDNEY DISEASE, UNSPECIFIED CKD STAGE, UNSPECIFIED WHETHER LONG TERM INSULIN USE (MULTI): ICD-10-CM

## 2024-02-08 DIAGNOSIS — M21.371 BILATERAL FOOT-DROP: ICD-10-CM

## 2024-02-08 DIAGNOSIS — N18.4 ANEMIA DUE TO STAGE 4 CHRONIC KIDNEY DISEASE (MULTI): ICD-10-CM

## 2024-02-08 DIAGNOSIS — M21.372 BILATERAL FOOT-DROP: ICD-10-CM

## 2024-02-08 DIAGNOSIS — D63.1 ANEMIA DUE TO STAGE 4 CHRONIC KIDNEY DISEASE (MULTI): ICD-10-CM

## 2024-02-08 DIAGNOSIS — R54 AGE-RELATED PHYSICAL DEBILITY: ICD-10-CM

## 2024-02-08 DIAGNOSIS — D68.61 ANTI-PHOSPHOLIPID SYNDROME (MULTI): Primary | ICD-10-CM

## 2024-02-08 PROCEDURE — 99308 SBSQ NF CARE LOW MDM 20: CPT | Performed by: FAMILY MEDICINE

## 2024-02-08 NOTE — LETTER
Patient: Janene Pinedo  : 1970    Encounter Date: 2024    Subjective  Patient ID: Janene Pinedo is a 53 y.o. female who is long term resident being seen and evaluated for multiple medical problems.    Dm2, debility, antiphospholipid syndrome, bilateral foot drop,          Review of Systems   Reason unable to perform ROS: debility.       Objective  There were no vitals taken for this visit.    Physical Exam  Vitals and nursing note reviewed.   Constitutional:       Appearance: She is ill-appearing.   HENT:      Head: Normocephalic and atraumatic.      Nose: Nose normal.      Mouth/Throat:      Mouth: Mucous membranes are moist.      Pharynx: Oropharynx is clear.   Eyes:      Extraocular Movements: Extraocular movements intact.      Conjunctiva/sclera: Conjunctivae normal.      Pupils: Pupils are equal, round, and reactive to light.   Cardiovascular:      Rate and Rhythm: Normal rate and regular rhythm.      Pulses: Normal pulses.   Pulmonary:      Effort: Pulmonary effort is normal.      Breath sounds: Normal breath sounds.   Abdominal:      General: Bowel sounds are normal.      Palpations: Abdomen is soft.   Musculoskeletal:         General: Tenderness present. Normal range of motion.      Cervical back: Normal range of motion and neck supple.   Skin:     General: Skin is warm and dry.      Capillary Refill: Capillary refill takes less than 2 seconds.      Coloration: Skin is pale.   Neurological:      Mental Status: She is alert. Mental status is at baseline.      Motor: Weakness present.      Gait: Gait abnormal.      Comments: Bilateral foot drop   Psychiatric:         Attention and Perception: She is inattentive.         Mood and Affect: Affect is flat.         Assessment/Plan  Problem List Items Addressed This Visit             ICD-10-CM    Anemia D64.9    Anti-phospholipid syndrome (CMS/HCC) - Primary D68.61    Bilateral foot-drop M21.371, M21.372    Type 2 diabetes mellitus with diabetic  chronic kidney disease, unspecified CKD stage, unspecified whether long term insulin use (CMS/[facility]) E11.22     Other Visit Diagnoses         Codes    Age-related physical debility     R54             Goals    None           Electronically Signed By: Kyra Rivera MD   2/13/24 11:37 AM

## 2024-02-13 NOTE — PROGRESS NOTES
Subjective   Patient ID: Janene Pinedo is a 53 y.o. female who is long term resident being seen and evaluated for multiple medical problems.    Dm2, debility, antiphospholipid syndrome, bilateral foot drop,          Review of Systems   Reason unable to perform ROS: debility.       Objective   There were no vitals taken for this visit.    Physical Exam  Vitals and nursing note reviewed.   Constitutional:       Appearance: She is ill-appearing.   HENT:      Head: Normocephalic and atraumatic.      Nose: Nose normal.      Mouth/Throat:      Mouth: Mucous membranes are moist.      Pharynx: Oropharynx is clear.   Eyes:      Extraocular Movements: Extraocular movements intact.      Conjunctiva/sclera: Conjunctivae normal.      Pupils: Pupils are equal, round, and reactive to light.   Cardiovascular:      Rate and Rhythm: Normal rate and regular rhythm.      Pulses: Normal pulses.   Pulmonary:      Effort: Pulmonary effort is normal.      Breath sounds: Normal breath sounds.   Abdominal:      General: Bowel sounds are normal.      Palpations: Abdomen is soft.   Musculoskeletal:         General: Tenderness present. Normal range of motion.      Cervical back: Normal range of motion and neck supple.   Skin:     General: Skin is warm and dry.      Capillary Refill: Capillary refill takes less than 2 seconds.      Coloration: Skin is pale.   Neurological:      Mental Status: She is alert. Mental status is at baseline.      Motor: Weakness present.      Gait: Gait abnormal.      Comments: Bilateral foot drop   Psychiatric:         Attention and Perception: She is inattentive.         Mood and Affect: Affect is flat.         Assessment/Plan   Problem List Items Addressed This Visit             ICD-10-CM    Anemia D64.9    Anti-phospholipid syndrome (CMS/HCC) - Primary D68.61    Bilateral foot-drop M21.371, M21.372    Type 2 diabetes mellitus with diabetic chronic kidney disease, unspecified CKD stage, unspecified whether long  term insulin use (CMS/[facility]) E11.22     Other Visit Diagnoses         Codes    Age-related physical debility     R54             Goals    None

## 2024-02-14 ENCOUNTER — HOSPITAL ENCOUNTER (OUTPATIENT)
Dept: DIALYSIS | Facility: HOSPITAL | Age: 54
Setting detail: DIALYSIS SERIES
Discharge: HOME | End: 2024-02-14
Payer: MEDICARE

## 2024-02-14 VITALS
SYSTOLIC BLOOD PRESSURE: 125 MMHG | WEIGHT: 191.8 LBS | BODY MASS INDEX: 37.46 KG/M2 | DIASTOLIC BLOOD PRESSURE: 72 MMHG | HEART RATE: 95 BPM

## 2024-02-14 DIAGNOSIS — N18.4 ANEMIA DUE TO STAGE 4 CHRONIC KIDNEY DISEASE (MULTI): ICD-10-CM

## 2024-02-14 DIAGNOSIS — N18.4 CHRONIC KIDNEY DISEASE, STAGE 4 (SEVERE) (MULTI): Primary | ICD-10-CM

## 2024-02-14 DIAGNOSIS — D63.1 ANEMIA DUE TO STAGE 4 CHRONIC KIDNEY DISEASE (MULTI): ICD-10-CM

## 2024-02-14 LAB
ALBUMIN SERPL BCP-MCNC: 3.5 G/DL (ref 3.4–5)
ANION GAP SERPL CALC-SCNC: 16 MMOL/L (ref 10–20)
BUN SERPL-MCNC: 33 MG/DL (ref 6–23)
CALCIUM SERPL-MCNC: 8.9 MG/DL (ref 8.6–10.6)
CHLORIDE SERPL-SCNC: 111 MMOL/L (ref 98–107)
CO2 SERPL-SCNC: 19 MMOL/L (ref 21–32)
CREAT SERPL-MCNC: 1.98 MG/DL (ref 0.5–1.05)
EGFRCR SERPLBLD CKD-EPI 2021: 30 ML/MIN/1.73M*2
ERYTHROCYTE [DISTWIDTH] IN BLOOD BY AUTOMATED COUNT: 14.6 % (ref 11.5–14.5)
GLUCOSE SERPL-MCNC: 133 MG/DL (ref 74–99)
HCT VFR BLD AUTO: 33.2 % (ref 36–46)
HGB BLD-MCNC: 10.5 G/DL (ref 12–16)
MCH RBC QN AUTO: 30.4 PG (ref 26–34)
MCHC RBC AUTO-ENTMCNC: 31.6 G/DL (ref 32–36)
MCV RBC AUTO: 96 FL (ref 80–100)
NRBC BLD-RTO: 0 /100 WBCS (ref 0–0)
PHOSPHATE SERPL-MCNC: 4 MG/DL (ref 2.5–4.9)
PLATELET # BLD AUTO: 174 X10*3/UL (ref 150–450)
POTASSIUM SERPL-SCNC: 4 MMOL/L (ref 3.5–5.3)
RBC # BLD AUTO: 3.45 X10*6/UL (ref 4–5.2)
SODIUM SERPL-SCNC: 142 MMOL/L (ref 136–145)
WBC # BLD AUTO: 7.3 X10*3/UL (ref 4.4–11.3)

## 2024-02-14 PROCEDURE — 80069 RENAL FUNCTION PANEL: CPT | Performed by: NURSE PRACTITIONER

## 2024-02-14 PROCEDURE — 96372 THER/PROPH/DIAG INJ SC/IM: CPT | Mod: 59

## 2024-02-14 PROCEDURE — 36415 COLL VENOUS BLD VENIPUNCTURE: CPT

## 2024-02-14 PROCEDURE — 2500000004 HC RX 250 GENERAL PHARMACY W/ HCPCS (ALT 636 FOR OP/ED): Mod: EC,JZ,SE

## 2024-02-14 PROCEDURE — 85027 COMPLETE CBC AUTOMATED: CPT | Performed by: NURSE PRACTITIONER

## 2024-02-14 PROCEDURE — 96374 THER/PROPH/DIAG INJ IV PUSH: CPT

## 2024-02-14 PROCEDURE — 36415 COLL VENOUS BLD VENIPUNCTURE: CPT | Performed by: NURSE PRACTITIONER

## 2024-02-14 RX ORDER — FAMOTIDINE 10 MG/ML
20 INJECTION INTRAVENOUS ONCE AS NEEDED
Status: CANCELLED | OUTPATIENT
Start: 2024-03-05

## 2024-02-14 RX ORDER — ALBUTEROL SULFATE 0.83 MG/ML
3 SOLUTION RESPIRATORY (INHALATION) AS NEEDED
Status: CANCELLED | OUTPATIENT
Start: 2024-03-05

## 2024-02-14 RX ORDER — EPINEPHRINE 0.3 MG/.3ML
0.3 INJECTION SUBCUTANEOUS EVERY 5 MIN PRN
Status: CANCELLED | OUTPATIENT
Start: 2024-03-05

## 2024-02-14 RX ADMIN — DARBEPOETIN ALFA 150 MCG: 150 INJECTION, SOLUTION INTRAVENOUS; SUBCUTANEOUS at 11:12

## 2024-02-14 NOTE — PROGRESS NOTES
How is your energy?  Tired  How is your appetite? Has cut down on eating meats. Eats yogurt products for protein in between. Has been thirsty more so has been drinking more often.   Any nausea and vomiting after eating? No  Any diarrhea or constipation? No   Any SOB? No  Any Edema in lower extremities? On occasion trace edema.     Vitals:    02/14/24 1100   Weight: 87 kg (191 lb 12.8 oz)      Vitals:    02/14/24 1105   BP: 125/72   Pulse: 95

## 2024-02-22 ENCOUNTER — LAB REQUISITION (OUTPATIENT)
Dept: LAB | Facility: HOSPITAL | Age: 54
End: 2024-02-22
Payer: MEDICARE

## 2024-02-22 DIAGNOSIS — I48.91 UNSPECIFIED ATRIAL FIBRILLATION (MULTI): ICD-10-CM

## 2024-02-22 DIAGNOSIS — Z79.01 LONG TERM (CURRENT) USE OF ANTICOAGULANTS: ICD-10-CM

## 2024-02-22 DIAGNOSIS — N39.0 URINARY TRACT INFECTION, SITE NOT SPECIFIED: ICD-10-CM

## 2024-02-22 DIAGNOSIS — R46.89 OTHER SYMPTOMS AND SIGNS INVOLVING APPEARANCE AND BEHAVIOR: ICD-10-CM

## 2024-02-22 LAB
APPEARANCE UR: CLEAR
BILIRUB UR STRIP.AUTO-MCNC: NEGATIVE MG/DL
COLOR UR: ABNORMAL
GLUCOSE UR STRIP.AUTO-MCNC: ABNORMAL MG/DL
INR PPP: 2.9 (ref 0.9–1.1)
KETONES UR STRIP.AUTO-MCNC: NEGATIVE MG/DL
LEUKOCYTE ESTERASE UR QL STRIP.AUTO: NEGATIVE
NITRITE UR QL STRIP.AUTO: NEGATIVE
PH UR STRIP.AUTO: 6 [PH]
PROT UR STRIP.AUTO-MCNC: ABNORMAL MG/DL
PROTHROMBIN TIME: 33.4 SECONDS (ref 9.8–12.8)
RBC # UR STRIP.AUTO: NEGATIVE /UL
RBC #/AREA URNS AUTO: NORMAL /HPF
SP GR UR STRIP.AUTO: 1.01
SQUAMOUS #/AREA URNS AUTO: NORMAL /HPF
UROBILINOGEN UR STRIP.AUTO-MCNC: <2 MG/DL
WBC #/AREA URNS AUTO: NORMAL /HPF

## 2024-02-22 PROCEDURE — 87086 URINE CULTURE/COLONY COUNT: CPT | Mod: OUT,PARLAB | Performed by: FAMILY MEDICINE

## 2024-02-22 PROCEDURE — 85610 PROTHROMBIN TIME: CPT | Mod: OUT | Performed by: FAMILY MEDICINE

## 2024-02-22 PROCEDURE — 81001 URINALYSIS AUTO W/SCOPE: CPT | Mod: OUT | Performed by: FAMILY MEDICINE

## 2024-02-23 LAB — BACTERIA UR CULT: NORMAL

## 2024-02-26 ENCOUNTER — NURSING HOME VISIT (OUTPATIENT)
Dept: POST ACUTE CARE | Facility: EXTERNAL LOCATION | Age: 54
End: 2024-02-26
Payer: MEDICARE

## 2024-02-26 ENCOUNTER — LAB REQUISITION (OUTPATIENT)
Dept: LAB | Facility: HOSPITAL | Age: 54
End: 2024-02-26
Payer: MEDICARE

## 2024-02-26 DIAGNOSIS — N18.4 ANEMIA DUE TO STAGE 4 CHRONIC KIDNEY DISEASE (MULTI): ICD-10-CM

## 2024-02-26 DIAGNOSIS — M21.372 BILATERAL FOOT-DROP: ICD-10-CM

## 2024-02-26 DIAGNOSIS — N18.5 CKD (CHRONIC KIDNEY DISEASE), STAGE V (MULTI): ICD-10-CM

## 2024-02-26 DIAGNOSIS — D63.1 ANEMIA DUE TO STAGE 4 CHRONIC KIDNEY DISEASE (MULTI): ICD-10-CM

## 2024-02-26 DIAGNOSIS — D68.61 ANTI-PHOSPHOLIPID SYNDROME (MULTI): Primary | ICD-10-CM

## 2024-02-26 DIAGNOSIS — I48.91 UNSPECIFIED ATRIAL FIBRILLATION (MULTI): ICD-10-CM

## 2024-02-26 DIAGNOSIS — J45.40 MODERATE PERSISTENT ASTHMA, UNSPECIFIED WHETHER COMPLICATED (HHS-HCC): ICD-10-CM

## 2024-02-26 DIAGNOSIS — M21.371 BILATERAL FOOT-DROP: ICD-10-CM

## 2024-02-26 DIAGNOSIS — R54 AGE-RELATED PHYSICAL DEBILITY: ICD-10-CM

## 2024-02-26 DIAGNOSIS — E11.22 TYPE 2 DIABETES MELLITUS WITH DIABETIC CHRONIC KIDNEY DISEASE, UNSPECIFIED CKD STAGE, UNSPECIFIED WHETHER LONG TERM INSULIN USE (MULTI): ICD-10-CM

## 2024-02-26 LAB
INR PPP: 2.3 (ref 0.9–1.1)
PROTHROMBIN TIME: 26.3 SECONDS (ref 9.8–12.8)

## 2024-02-26 PROCEDURE — 85610 PROTHROMBIN TIME: CPT | Mod: OUT | Performed by: FAMILY MEDICINE

## 2024-02-26 PROCEDURE — 99308 SBSQ NF CARE LOW MDM 20: CPT | Performed by: FAMILY MEDICINE

## 2024-02-26 NOTE — PROGRESS NOTES
Subjective   Patient ID: Janene Pinedo is a 54 y.o. female who is long term resident being seen and evaluated for multiple medical problems.    Anti phospholipid syndrome, ckd, asthma, anemia, debility, DM2, bilateral foot drop         Review of Systems   Reason unable to perform ROS: debility.       Objective   There were no vitals taken for this visit.    Physical Exam  Vitals and nursing note reviewed.   Constitutional:       Appearance: She is ill-appearing.   HENT:      Head: Normocephalic and atraumatic.      Nose: Nose normal.      Mouth/Throat:      Mouth: Mucous membranes are moist.      Pharynx: Oropharynx is clear.   Eyes:      Extraocular Movements: Extraocular movements intact.      Conjunctiva/sclera: Conjunctivae normal.      Pupils: Pupils are equal, round, and reactive to light.   Cardiovascular:      Rate and Rhythm: Normal rate and regular rhythm.      Pulses: Normal pulses.   Pulmonary:      Effort: Pulmonary effort is normal.      Breath sounds: Wheezing present.   Abdominal:      General: Bowel sounds are normal.      Palpations: Abdomen is soft.   Musculoskeletal:         General: Tenderness present. Normal range of motion.      Cervical back: Normal range of motion and neck supple.   Skin:     General: Skin is warm and dry.      Capillary Refill: Capillary refill takes less than 2 seconds.      Coloration: Skin is pale.   Neurological:      Mental Status: She is alert. Mental status is at baseline.      Motor: Weakness present.      Gait: Gait abnormal.   Psychiatric:         Mood and Affect: Affect is flat.         Behavior: Behavior is slowed.         Assessment/Plan   Problem List Items Addressed This Visit             ICD-10-CM    Anemia D64.9    Anti-phospholipid syndrome (CMS/HCC) - Primary D68.61    Asthma J45.909    Bilateral foot-drop M21.371, M21.372    CKD (chronic kidney disease), stage V (CMS/HCC) N18.5    Type 2 diabetes mellitus with diabetic chronic kidney disease,  unspecified CKD stage, unspecified whether long term insulin use (CMS/[facility]) E11.22     Other Visit Diagnoses         Codes    Age-related physical debility     R54             Goals    None

## 2024-02-26 NOTE — LETTER
Patient: Janene Pinedo  : 1970    Encounter Date: 2024    Subjective  Patient ID: Janene Pinedo is a 54 y.o. female who is long term resident being seen and evaluated for multiple medical problems.    Anti phospholipid syndrome, ckd, asthma, anemia, debility, DM2, bilateral foot drop         Review of Systems   Reason unable to perform ROS: debility.       Objective  There were no vitals taken for this visit.    Physical Exam  Vitals and nursing note reviewed.   Constitutional:       Appearance: She is ill-appearing.   HENT:      Head: Normocephalic and atraumatic.      Nose: Nose normal.      Mouth/Throat:      Mouth: Mucous membranes are moist.      Pharynx: Oropharynx is clear.   Eyes:      Extraocular Movements: Extraocular movements intact.      Conjunctiva/sclera: Conjunctivae normal.      Pupils: Pupils are equal, round, and reactive to light.   Cardiovascular:      Rate and Rhythm: Normal rate and regular rhythm.      Pulses: Normal pulses.   Pulmonary:      Effort: Pulmonary effort is normal.      Breath sounds: Wheezing present.   Abdominal:      General: Bowel sounds are normal.      Palpations: Abdomen is soft.   Musculoskeletal:         General: Tenderness present. Normal range of motion.      Cervical back: Normal range of motion and neck supple.   Skin:     General: Skin is warm and dry.      Capillary Refill: Capillary refill takes less than 2 seconds.      Coloration: Skin is pale.   Neurological:      Mental Status: She is alert. Mental status is at baseline.      Motor: Weakness present.      Gait: Gait abnormal.   Psychiatric:         Mood and Affect: Affect is flat.         Behavior: Behavior is slowed.         Assessment/Plan  Problem List Items Addressed This Visit             ICD-10-CM    Anemia D64.9    Anti-phospholipid syndrome (CMS/HCC) - Primary D68.61    Asthma J45.909    Bilateral foot-drop M21.371, M21.372    CKD (chronic kidney disease), stage V (CMS/HCC) N18.5     Type 2 diabetes mellitus with diabetic chronic kidney disease, unspecified CKD stage, unspecified whether long term insulin use (CMS/[facility]) E11.22     Other Visit Diagnoses         Codes    Age-related physical debility     R54             Goals    None           Electronically Signed By: Kyra Rivera MD   2/26/24  2:40 PM

## 2024-02-29 ENCOUNTER — LAB REQUISITION (OUTPATIENT)
Dept: LAB | Facility: HOSPITAL | Age: 54
End: 2024-02-29
Payer: MEDICARE

## 2024-02-29 DIAGNOSIS — I48.91 UNSPECIFIED ATRIAL FIBRILLATION (MULTI): ICD-10-CM

## 2024-02-29 LAB
INR PPP: 2.7 (ref 0.9–1.1)
PROTHROMBIN TIME: 30.6 SECONDS (ref 9.8–12.8)

## 2024-02-29 PROCEDURE — 85610 PROTHROMBIN TIME: CPT | Mod: OUT | Performed by: FAMILY MEDICINE

## 2024-03-04 ENCOUNTER — LAB REQUISITION (OUTPATIENT)
Dept: LAB | Facility: HOSPITAL | Age: 54
End: 2024-03-04
Payer: MEDICARE

## 2024-03-04 DIAGNOSIS — Z79.01 LONG TERM (CURRENT) USE OF ANTICOAGULANTS: ICD-10-CM

## 2024-03-04 LAB
INR PPP: 2.1 (ref 0.9–1.1)
PROTHROMBIN TIME: 24.2 SECONDS (ref 9.8–12.8)

## 2024-03-04 PROCEDURE — 85610 PROTHROMBIN TIME: CPT | Mod: OUT | Performed by: FAMILY MEDICINE

## 2024-03-08 ENCOUNTER — NURSING HOME VISIT (OUTPATIENT)
Dept: POST ACUTE CARE | Facility: EXTERNAL LOCATION | Age: 54
End: 2024-03-08
Payer: MEDICARE

## 2024-03-08 DIAGNOSIS — R54 AGE-RELATED PHYSICAL DEBILITY: ICD-10-CM

## 2024-03-08 DIAGNOSIS — E11.22 TYPE 2 DIABETES MELLITUS WITH DIABETIC CHRONIC KIDNEY DISEASE, UNSPECIFIED CKD STAGE, UNSPECIFIED WHETHER LONG TERM INSULIN USE (MULTI): ICD-10-CM

## 2024-03-08 DIAGNOSIS — N18.4 ANEMIA DUE TO STAGE 4 CHRONIC KIDNEY DISEASE (MULTI): ICD-10-CM

## 2024-03-08 DIAGNOSIS — D63.1 ANEMIA DUE TO STAGE 4 CHRONIC KIDNEY DISEASE (MULTI): ICD-10-CM

## 2024-03-08 DIAGNOSIS — J45.40 MODERATE PERSISTENT ASTHMA, UNSPECIFIED WHETHER COMPLICATED (HHS-HCC): ICD-10-CM

## 2024-03-08 DIAGNOSIS — M21.372 BILATERAL FOOT-DROP: ICD-10-CM

## 2024-03-08 DIAGNOSIS — D68.61 ANTI-PHOSPHOLIPID SYNDROME (MULTI): Primary | ICD-10-CM

## 2024-03-08 DIAGNOSIS — M21.371 BILATERAL FOOT-DROP: ICD-10-CM

## 2024-03-08 DIAGNOSIS — N18.5 CKD (CHRONIC KIDNEY DISEASE), STAGE V (MULTI): ICD-10-CM

## 2024-03-08 NOTE — LETTER
Patient: Janene Pinedo  : 1970    Encounter Date: 2024    Subjective  Patient ID: Janene Pinedo is a 54 y.o. female who is long term resident being seen and evaluated for multiple medical problems.    Antiphospholipid syndrome, debility, anemia, foot drop, asthma, ckd, dm2,          Review of Systems   Reason unable to perform ROS: debility.       Objective  There were no vitals taken for this visit.    Physical Exam  Vitals and nursing note reviewed.   Constitutional:       Appearance: She is ill-appearing.   HENT:      Head: Normocephalic and atraumatic.      Nose: Nose normal.      Mouth/Throat:      Mouth: Mucous membranes are moist.      Pharynx: Oropharynx is clear.   Eyes:      Extraocular Movements: Extraocular movements intact.      Conjunctiva/sclera: Conjunctivae normal.      Pupils: Pupils are equal, round, and reactive to light.   Cardiovascular:      Rate and Rhythm: Normal rate and regular rhythm.      Pulses: Normal pulses.   Pulmonary:      Effort: Pulmonary effort is normal.      Breath sounds: Normal breath sounds.   Abdominal:      General: Bowel sounds are normal.      Palpations: Abdomen is soft.   Musculoskeletal:         General: Tenderness present. Normal range of motion.      Cervical back: Normal range of motion and neck supple.   Skin:     General: Skin is warm and dry.      Capillary Refill: Capillary refill takes less than 2 seconds.      Coloration: Skin is pale.   Neurological:      Mental Status: She is alert. Mental status is at baseline.      Motor: Weakness present.      Gait: Gait abnormal.      Comments: Bilateral foot drop   Psychiatric:         Attention and Perception: She is inattentive.         Mood and Affect: Mood is depressed. Affect is flat.         Behavior: Behavior is slowed.         Assessment/Plan  Problem List Items Addressed This Visit             ICD-10-CM    Anemia D64.9    Anti-phospholipid syndrome (CMS/HCC) - Primary D68.61    Asthma J45.909     Bilateral foot-drop M21.371, M21.372    CKD (chronic kidney disease), stage V (CMS/[facility]) N18.5    Type 2 diabetes mellitus with diabetic chronic kidney disease, unspecified CKD stage, unspecified whether long term insulin use (CMS/[facility]) E11.22     Other Visit Diagnoses         Codes    Age-related physical debility     R54             Goals    None           Electronically Signed By: Kyra Rivera MD   3/12/24  1:05 PM

## 2024-03-11 ENCOUNTER — LAB REQUISITION (OUTPATIENT)
Dept: LAB | Facility: HOSPITAL | Age: 54
End: 2024-03-11
Payer: MEDICARE

## 2024-03-11 DIAGNOSIS — Z79.01 LONG TERM (CURRENT) USE OF ANTICOAGULANTS: ICD-10-CM

## 2024-03-11 LAB
INR PPP: 1.3 (ref 0.9–1.1)
PROTHROMBIN TIME: 14.8 SECONDS (ref 9.8–12.8)

## 2024-03-11 PROCEDURE — 85610 PROTHROMBIN TIME: CPT | Mod: OUT | Performed by: FAMILY MEDICINE

## 2024-03-12 NOTE — PROGRESS NOTES
Subjective   Patient ID: Janene Pinedo is a 54 y.o. female who is long term resident being seen and evaluated for multiple medical problems.    Antiphospholipid syndrome, debility, anemia, foot drop, asthma, ckd, dm2,          Review of Systems   Reason unable to perform ROS: debility.       Objective   There were no vitals taken for this visit.    Physical Exam  Vitals and nursing note reviewed.   Constitutional:       Appearance: She is ill-appearing.   HENT:      Head: Normocephalic and atraumatic.      Nose: Nose normal.      Mouth/Throat:      Mouth: Mucous membranes are moist.      Pharynx: Oropharynx is clear.   Eyes:      Extraocular Movements: Extraocular movements intact.      Conjunctiva/sclera: Conjunctivae normal.      Pupils: Pupils are equal, round, and reactive to light.   Cardiovascular:      Rate and Rhythm: Normal rate and regular rhythm.      Pulses: Normal pulses.   Pulmonary:      Effort: Pulmonary effort is normal.      Breath sounds: Normal breath sounds.   Abdominal:      General: Bowel sounds are normal.      Palpations: Abdomen is soft.   Musculoskeletal:         General: Tenderness present. Normal range of motion.      Cervical back: Normal range of motion and neck supple.   Skin:     General: Skin is warm and dry.      Capillary Refill: Capillary refill takes less than 2 seconds.      Coloration: Skin is pale.   Neurological:      Mental Status: She is alert. Mental status is at baseline.      Motor: Weakness present.      Gait: Gait abnormal.      Comments: Bilateral foot drop   Psychiatric:         Attention and Perception: She is inattentive.         Mood and Affect: Mood is depressed. Affect is flat.         Behavior: Behavior is slowed.         Assessment/Plan   Problem List Items Addressed This Visit             ICD-10-CM    Anemia D64.9    Anti-phospholipid syndrome (CMS/HCC) - Primary D68.61    Asthma J45.909    Bilateral foot-drop M21.371, M21.372    CKD (chronic kidney  disease), stage V (CMS/[facility]) N18.5    Type 2 diabetes mellitus with diabetic chronic kidney disease, unspecified CKD stage, unspecified whether long term insulin use (CMS/[facility]) E11.22     Other Visit Diagnoses         Codes    Age-related physical debility     R54             Goals    None

## 2024-03-14 ENCOUNTER — LAB REQUISITION (OUTPATIENT)
Dept: LAB | Facility: HOSPITAL | Age: 54
End: 2024-03-14
Payer: MEDICARE

## 2024-03-14 DIAGNOSIS — Z79.01 LONG TERM (CURRENT) USE OF ANTICOAGULANTS: ICD-10-CM

## 2024-03-14 DIAGNOSIS — E78.5 HYPERLIPIDEMIA, UNSPECIFIED: ICD-10-CM

## 2024-03-14 DIAGNOSIS — E87.6 HYPOKALEMIA: ICD-10-CM

## 2024-03-14 DIAGNOSIS — E55.9 VITAMIN D DEFICIENCY, UNSPECIFIED: ICD-10-CM

## 2024-03-14 LAB
INR PPP: 1.8 (ref 0.9–1.1)
PROTHROMBIN TIME: 20 SECONDS (ref 9.8–12.8)

## 2024-03-14 PROCEDURE — 85610 PROTHROMBIN TIME: CPT | Mod: OUT | Performed by: FAMILY MEDICINE

## 2024-03-18 ENCOUNTER — LAB REQUISITION (OUTPATIENT)
Dept: LAB | Facility: HOSPITAL | Age: 54
End: 2024-03-18
Payer: MEDICARE

## 2024-03-18 DIAGNOSIS — Z79.01 LONG TERM (CURRENT) USE OF ANTICOAGULANTS: ICD-10-CM

## 2024-03-18 LAB
INR PPP: 1.3 (ref 0.9–1.1)
PROTHROMBIN TIME: 14.6 SECONDS (ref 9.8–12.8)

## 2024-03-18 PROCEDURE — 85610 PROTHROMBIN TIME: CPT | Mod: OUT | Performed by: FAMILY MEDICINE

## 2024-03-21 ENCOUNTER — LAB REQUISITION (OUTPATIENT)
Dept: LAB | Facility: HOSPITAL | Age: 54
End: 2024-03-21
Payer: MEDICARE

## 2024-03-21 DIAGNOSIS — I48.91 UNSPECIFIED ATRIAL FIBRILLATION (MULTI): ICD-10-CM

## 2024-03-21 DIAGNOSIS — Z51.81 ENCOUNTER FOR THERAPEUTIC DRUG LEVEL MONITORING: ICD-10-CM

## 2024-03-21 LAB
INR PPP: 1.5 (ref 0.9–1.1)
PROTHROMBIN TIME: 17 SECONDS (ref 9.8–12.8)

## 2024-03-21 PROCEDURE — 85610 PROTHROMBIN TIME: CPT | Mod: OUT | Performed by: FAMILY MEDICINE

## 2024-03-25 ENCOUNTER — LAB REQUISITION (OUTPATIENT)
Dept: LAB | Facility: HOSPITAL | Age: 54
End: 2024-03-25
Payer: MEDICARE

## 2024-03-25 DIAGNOSIS — Z79.01 LONG TERM (CURRENT) USE OF ANTICOAGULANTS: ICD-10-CM

## 2024-03-25 LAB
INR PPP: 3.6 (ref 0.9–1.1)
PROTHROMBIN TIME: 40.6 SECONDS (ref 9.8–12.8)

## 2024-03-25 PROCEDURE — 85610 PROTHROMBIN TIME: CPT | Mod: OUT | Performed by: FAMILY MEDICINE

## 2024-03-26 ENCOUNTER — INFUSION (OUTPATIENT)
Dept: HEMATOLOGY/ONCOLOGY | Facility: HOSPITAL | Age: 54
End: 2024-03-26
Payer: MEDICARE

## 2024-03-26 ENCOUNTER — APPOINTMENT (OUTPATIENT)
Dept: DIALYSIS | Facility: HOSPITAL | Age: 54
End: 2024-03-26
Payer: MEDICARE

## 2024-03-26 VITALS
WEIGHT: 203.48 LBS | BODY MASS INDEX: 39.74 KG/M2 | RESPIRATION RATE: 18 BRPM | TEMPERATURE: 98.4 F | OXYGEN SATURATION: 97 % | SYSTOLIC BLOOD PRESSURE: 143 MMHG | DIASTOLIC BLOOD PRESSURE: 82 MMHG | HEART RATE: 101 BPM

## 2024-03-26 DIAGNOSIS — N18.4 ANEMIA DUE TO STAGE 4 CHRONIC KIDNEY DISEASE (MULTI): ICD-10-CM

## 2024-03-26 DIAGNOSIS — D63.1 ANEMIA DUE TO STAGE 4 CHRONIC KIDNEY DISEASE (MULTI): ICD-10-CM

## 2024-03-26 DIAGNOSIS — N18.4 STAGE 4 CHRONIC KIDNEY DISEASE (MULTI): ICD-10-CM

## 2024-03-26 DIAGNOSIS — N18.4 CHRONIC KIDNEY DISEASE, STAGE 4 (SEVERE) (MULTI): ICD-10-CM

## 2024-03-26 LAB
ERYTHROCYTE [DISTWIDTH] IN BLOOD BY AUTOMATED COUNT: 15.1 % (ref 11.5–14.5)
HCT VFR BLD AUTO: 30 % (ref 36–46)
HGB BLD-MCNC: 9.9 G/DL (ref 12–16)
MCH RBC QN AUTO: 29.9 PG (ref 26–34)
MCHC RBC AUTO-ENTMCNC: 33 G/DL (ref 32–36)
MCV RBC AUTO: 91 FL (ref 80–100)
NRBC BLD-RTO: 0 /100 WBCS (ref 0–0)
PLATELET # BLD AUTO: 168 X10*3/UL (ref 150–450)
RBC # BLD AUTO: 3.31 X10*6/UL (ref 4–5.2)
WBC # BLD AUTO: 7.9 X10*3/UL (ref 4.4–11.3)

## 2024-03-26 PROCEDURE — 2500000004 HC RX 250 GENERAL PHARMACY W/ HCPCS (ALT 636 FOR OP/ED): Mod: JZ,EC | Performed by: INTERNAL MEDICINE

## 2024-03-26 PROCEDURE — 85027 COMPLETE CBC AUTOMATED: CPT | Performed by: INTERNAL MEDICINE

## 2024-03-26 PROCEDURE — 96372 THER/PROPH/DIAG INJ SC/IM: CPT

## 2024-03-26 PROCEDURE — 96372 THER/PROPH/DIAG INJ SC/IM: CPT | Performed by: INTERNAL MEDICINE

## 2024-03-26 RX ORDER — FAMOTIDINE 10 MG/ML
20 INJECTION INTRAVENOUS ONCE AS NEEDED
Status: CANCELLED | OUTPATIENT
Start: 2024-04-10

## 2024-03-26 RX ORDER — ALBUTEROL SULFATE 0.83 MG/ML
3 SOLUTION RESPIRATORY (INHALATION) AS NEEDED
Status: CANCELLED | OUTPATIENT
Start: 2024-04-10

## 2024-03-26 RX ORDER — EPINEPHRINE 0.3 MG/.3ML
0.3 INJECTION SUBCUTANEOUS EVERY 5 MIN PRN
Status: CANCELLED | OUTPATIENT
Start: 2024-04-10

## 2024-03-26 RX ADMIN — DARBEPOETIN ALFA 150 MCG: 150 INJECTION, SOLUTION INTRAVENOUS; SUBCUTANEOUS at 12:26

## 2024-03-26 NOTE — PROGRESS NOTES
Hgb today 9.9, Aranesp injection indicated.   Tolerated injection without incident. Discharged  in stable condition at 1230.

## 2024-03-28 ENCOUNTER — LAB REQUISITION (OUTPATIENT)
Dept: LAB | Facility: HOSPITAL | Age: 54
End: 2024-03-28
Payer: MEDICARE

## 2024-03-28 ENCOUNTER — NURSING HOME VISIT (OUTPATIENT)
Dept: POST ACUTE CARE | Facility: EXTERNAL LOCATION | Age: 54
End: 2024-03-28
Payer: MEDICARE

## 2024-03-28 DIAGNOSIS — M21.371 BILATERAL FOOT-DROP: ICD-10-CM

## 2024-03-28 DIAGNOSIS — I10 ESSENTIAL (PRIMARY) HYPERTENSION: ICD-10-CM

## 2024-03-28 DIAGNOSIS — J45.40 MODERATE PERSISTENT ASTHMA, UNSPECIFIED WHETHER COMPLICATED (HHS-HCC): ICD-10-CM

## 2024-03-28 DIAGNOSIS — D63.1 ANEMIA DUE TO STAGE 4 CHRONIC KIDNEY DISEASE (MULTI): ICD-10-CM

## 2024-03-28 DIAGNOSIS — N18.5 CKD (CHRONIC KIDNEY DISEASE), STAGE V (MULTI): ICD-10-CM

## 2024-03-28 DIAGNOSIS — N18.4 ANEMIA DUE TO STAGE 4 CHRONIC KIDNEY DISEASE (MULTI): ICD-10-CM

## 2024-03-28 DIAGNOSIS — G12.29: ICD-10-CM

## 2024-03-28 DIAGNOSIS — N18.9 CHRONIC KIDNEY DISEASE, UNSPECIFIED: ICD-10-CM

## 2024-03-28 DIAGNOSIS — M21.372 BILATERAL FOOT-DROP: ICD-10-CM

## 2024-03-28 DIAGNOSIS — D68.61 ANTI-PHOSPHOLIPID SYNDROME (MULTI): Primary | ICD-10-CM

## 2024-03-28 DIAGNOSIS — R54 AGE-RELATED PHYSICAL DEBILITY: ICD-10-CM

## 2024-03-28 DIAGNOSIS — E11.22 TYPE 2 DIABETES MELLITUS WITH DIABETIC CHRONIC KIDNEY DISEASE, UNSPECIFIED CKD STAGE, UNSPECIFIED WHETHER LONG TERM INSULIN USE (MULTI): ICD-10-CM

## 2024-03-28 LAB
INR PPP: 1.4 (ref 0.9–1.1)
PROTHROMBIN TIME: 15.5 SECONDS (ref 9.8–12.8)

## 2024-03-28 PROCEDURE — 85610 PROTHROMBIN TIME: CPT | Mod: OUT | Performed by: FAMILY MEDICINE

## 2024-03-28 PROCEDURE — 99308 SBSQ NF CARE LOW MDM 20: CPT | Performed by: FAMILY MEDICINE

## 2024-03-28 NOTE — LETTER
Patient: Janene Pinedo  : 1970    Encounter Date: 2024    Subjective  Patient ID: Janene Pinedo is a 54 y.o. female who is long term resident being seen and evaluated for multiple medical problems.    Antiphospholipid syndrome, upper motor neuron lesion, anemia, debility, ckd, astrhma, foot drop, weakness         Review of Systems   Reason unable to perform ROS: debility.       Objective  There were no vitals taken for this visit.    Physical Exam  Vitals and nursing note reviewed.   Constitutional:       Appearance: Normal appearance. She is ill-appearing.   HENT:      Head: Normocephalic and atraumatic.      Nose: Nose normal.      Mouth/Throat:      Mouth: Mucous membranes are moist.      Pharynx: Oropharynx is clear.   Eyes:      Extraocular Movements: Extraocular movements intact.      Conjunctiva/sclera: Conjunctivae normal.      Pupils: Pupils are equal, round, and reactive to light.   Cardiovascular:      Rate and Rhythm: Normal rate and regular rhythm.      Pulses: Normal pulses.   Pulmonary:      Effort: Pulmonary effort is normal.      Breath sounds: Normal breath sounds.   Abdominal:      General: Bowel sounds are normal.      Palpations: Abdomen is soft.   Musculoskeletal:         General: Tenderness present. Normal range of motion.      Cervical back: Normal range of motion and neck supple.   Skin:     General: Skin is warm and dry.      Capillary Refill: Capillary refill takes less than 2 seconds.      Coloration: Skin is pale.   Neurological:      General: No focal deficit present.      Mental Status: She is alert.      Motor: Weakness present.      Gait: Gait abnormal.   Psychiatric:         Attention and Perception: She is inattentive.         Mood and Affect: Affect is flat.         Assessment/Plan  Problem List Items Addressed This Visit    None       Goals    None           Electronically Signed By: Kyra Rivera MD   3/28/24  2:21 PM

## 2024-03-28 NOTE — PROGRESS NOTES
Subjective   Patient ID: Janene Pinedo is a 54 y.o. female who is long term resident being seen and evaluated for multiple medical problems.    Antiphospholipid syndrome, upper motor neuron lesion, anemia, debility, ckd, astrhma, foot drop, weakness         Review of Systems   Reason unable to perform ROS: debility.       Objective   There were no vitals taken for this visit.    Physical Exam  Vitals and nursing note reviewed.   Constitutional:       Appearance: Normal appearance. She is ill-appearing.   HENT:      Head: Normocephalic and atraumatic.      Nose: Nose normal.      Mouth/Throat:      Mouth: Mucous membranes are moist.      Pharynx: Oropharynx is clear.   Eyes:      Extraocular Movements: Extraocular movements intact.      Conjunctiva/sclera: Conjunctivae normal.      Pupils: Pupils are equal, round, and reactive to light.   Cardiovascular:      Rate and Rhythm: Normal rate and regular rhythm.      Pulses: Normal pulses.   Pulmonary:      Effort: Pulmonary effort is normal.      Breath sounds: Normal breath sounds.   Abdominal:      General: Bowel sounds are normal.      Palpations: Abdomen is soft.   Musculoskeletal:         General: Tenderness present. Normal range of motion.      Cervical back: Normal range of motion and neck supple.   Skin:     General: Skin is warm and dry.      Capillary Refill: Capillary refill takes less than 2 seconds.      Coloration: Skin is pale.   Neurological:      General: No focal deficit present.      Mental Status: She is alert.      Motor: Weakness present.      Gait: Gait abnormal.   Psychiatric:         Attention and Perception: She is inattentive.         Mood and Affect: Affect is flat.         Assessment/Plan   Problem List Items Addressed This Visit    None       Goals    None

## 2024-04-04 ENCOUNTER — LAB REQUISITION (OUTPATIENT)
Dept: LAB | Facility: HOSPITAL | Age: 54
End: 2024-04-04
Payer: COMMERCIAL

## 2024-04-04 DIAGNOSIS — E55.9 VITAMIN D DEFICIENCY, UNSPECIFIED: ICD-10-CM

## 2024-04-04 DIAGNOSIS — N18.9 CHRONIC KIDNEY DISEASE, UNSPECIFIED: ICD-10-CM

## 2024-04-04 DIAGNOSIS — Z79.01 LONG TERM (CURRENT) USE OF ANTICOAGULANTS: ICD-10-CM

## 2024-04-04 LAB
INR PPP: 3.6 (ref 0.9–1.1)
PROTHROMBIN TIME: 40.9 SECONDS (ref 9.8–12.8)

## 2024-04-04 PROCEDURE — 85610 PROTHROMBIN TIME: CPT | Mod: OUT | Performed by: FAMILY MEDICINE

## 2024-04-08 ENCOUNTER — LAB REQUISITION (OUTPATIENT)
Dept: LAB | Facility: HOSPITAL | Age: 54
End: 2024-04-08
Payer: COMMERCIAL

## 2024-04-08 DIAGNOSIS — E87.6 HYPOKALEMIA: ICD-10-CM

## 2024-04-08 DIAGNOSIS — N18.9 CHRONIC KIDNEY DISEASE, UNSPECIFIED: ICD-10-CM

## 2024-04-08 DIAGNOSIS — E55.9 VITAMIN D DEFICIENCY, UNSPECIFIED: ICD-10-CM

## 2024-04-08 LAB
INR PPP: 2.8 (ref 0.9–1.1)
PROTHROMBIN TIME: 31.6 SECONDS (ref 9.8–12.8)

## 2024-04-08 PROCEDURE — 85610 PROTHROMBIN TIME: CPT | Mod: OUT | Performed by: FAMILY MEDICINE

## 2024-04-11 ENCOUNTER — LAB REQUISITION (OUTPATIENT)
Dept: LAB | Facility: HOSPITAL | Age: 54
End: 2024-04-11
Payer: MEDICARE

## 2024-04-11 DIAGNOSIS — N18.6 END STAGE RENAL DISEASE (MULTI): ICD-10-CM

## 2024-04-11 DIAGNOSIS — Z79.01 LONG TERM (CURRENT) USE OF ANTICOAGULANTS: ICD-10-CM

## 2024-04-11 DIAGNOSIS — N18.9 CHRONIC KIDNEY DISEASE, UNSPECIFIED: ICD-10-CM

## 2024-04-11 LAB
INR PPP: 3.3 (ref 0.9–1.1)
PROTHROMBIN TIME: 38.2 SECONDS (ref 9.8–12.8)

## 2024-04-11 PROCEDURE — 85610 PROTHROMBIN TIME: CPT | Mod: OUT | Performed by: FAMILY MEDICINE

## 2024-04-15 ENCOUNTER — LAB REQUISITION (OUTPATIENT)
Dept: LAB | Facility: HOSPITAL | Age: 54
End: 2024-04-15
Payer: MEDICARE

## 2024-04-15 DIAGNOSIS — E03.9 HYPOTHYROIDISM, UNSPECIFIED: ICD-10-CM

## 2024-04-15 DIAGNOSIS — E78.5 HYPERLIPIDEMIA, UNSPECIFIED: ICD-10-CM

## 2024-04-15 DIAGNOSIS — D50.9 IRON DEFICIENCY ANEMIA, UNSPECIFIED: ICD-10-CM

## 2024-04-15 DIAGNOSIS — Z79.01 LONG TERM (CURRENT) USE OF ANTICOAGULANTS: ICD-10-CM

## 2024-04-15 DIAGNOSIS — N18.6 END STAGE RENAL DISEASE (MULTI): ICD-10-CM

## 2024-04-15 DIAGNOSIS — D64.9 ANEMIA, UNSPECIFIED: ICD-10-CM

## 2024-04-15 DIAGNOSIS — N17.9 ACUTE KIDNEY FAILURE, UNSPECIFIED (CMS-HCC): ICD-10-CM

## 2024-04-15 DIAGNOSIS — K21.9 GASTRO-ESOPHAGEAL REFLUX DISEASE WITHOUT ESOPHAGITIS: ICD-10-CM

## 2024-04-15 DIAGNOSIS — E87.6 HYPOKALEMIA: ICD-10-CM

## 2024-04-15 DIAGNOSIS — R11.2 NAUSEA WITH VOMITING, UNSPECIFIED: ICD-10-CM

## 2024-04-15 DIAGNOSIS — Z91.81 HISTORY OF FALLING: ICD-10-CM

## 2024-04-15 DIAGNOSIS — F41.9 ANXIETY DISORDER, UNSPECIFIED: ICD-10-CM

## 2024-04-15 LAB
INR PPP: 4.4 (ref 0.9–1.1)
PROTHROMBIN TIME: 50.5 SECONDS (ref 9.8–12.8)

## 2024-04-15 PROCEDURE — 85610 PROTHROMBIN TIME: CPT | Mod: OUT | Performed by: FAMILY MEDICINE

## 2024-04-18 ENCOUNTER — LAB REQUISITION (OUTPATIENT)
Dept: LAB | Facility: HOSPITAL | Age: 54
End: 2024-04-18
Payer: MEDICARE

## 2024-04-18 DIAGNOSIS — I48.91 UNSPECIFIED ATRIAL FIBRILLATION (MULTI): ICD-10-CM

## 2024-04-18 LAB
INR PPP: 1.2 (ref 0.9–1.1)
PROTHROMBIN TIME: 13.7 SECONDS (ref 9.8–12.8)

## 2024-04-18 PROCEDURE — 85610 PROTHROMBIN TIME: CPT | Mod: OUT | Performed by: FAMILY MEDICINE

## 2024-04-22 ENCOUNTER — LAB REQUISITION (OUTPATIENT)
Dept: LAB | Facility: HOSPITAL | Age: 54
End: 2024-04-22
Payer: MEDICARE

## 2024-04-22 ENCOUNTER — NURSING HOME VISIT (OUTPATIENT)
Dept: POST ACUTE CARE | Facility: EXTERNAL LOCATION | Age: 54
End: 2024-04-22
Payer: MEDICARE

## 2024-04-22 DIAGNOSIS — D50.9 MICROCYTIC ANEMIA: ICD-10-CM

## 2024-04-22 DIAGNOSIS — G89.29 CHRONIC KNEE PAIN, UNSPECIFIED LATERALITY: ICD-10-CM

## 2024-04-22 DIAGNOSIS — D50.8 OTHER IRON DEFICIENCY ANEMIA: ICD-10-CM

## 2024-04-22 DIAGNOSIS — J45.40 MODERATE PERSISTENT ASTHMA, UNSPECIFIED WHETHER COMPLICATED (HHS-HCC): ICD-10-CM

## 2024-04-22 DIAGNOSIS — M21.372 BILATERAL FOOT-DROP: ICD-10-CM

## 2024-04-22 DIAGNOSIS — E11.22 TYPE 2 DIABETES MELLITUS WITH DIABETIC CHRONIC KIDNEY DISEASE, UNSPECIFIED CKD STAGE, UNSPECIFIED WHETHER LONG TERM INSULIN USE (MULTI): ICD-10-CM

## 2024-04-22 DIAGNOSIS — D63.1 ANEMIA DUE TO STAGE 4 CHRONIC KIDNEY DISEASE (MULTI): ICD-10-CM

## 2024-04-22 DIAGNOSIS — D68.61 ANTI-PHOSPHOLIPID SYNDROME (MULTI): Primary | ICD-10-CM

## 2024-04-22 DIAGNOSIS — M21.371 BILATERAL FOOT-DROP: ICD-10-CM

## 2024-04-22 DIAGNOSIS — Z79.01 LONG TERM (CURRENT) USE OF ANTICOAGULANTS: ICD-10-CM

## 2024-04-22 DIAGNOSIS — N18.4 ANEMIA DUE TO STAGE 4 CHRONIC KIDNEY DISEASE (MULTI): ICD-10-CM

## 2024-04-22 DIAGNOSIS — N18.5 CKD (CHRONIC KIDNEY DISEASE), STAGE V (MULTI): ICD-10-CM

## 2024-04-22 DIAGNOSIS — R54 AGE-RELATED PHYSICAL DEBILITY: ICD-10-CM

## 2024-04-22 DIAGNOSIS — M25.569 CHRONIC KNEE PAIN, UNSPECIFIED LATERALITY: ICD-10-CM

## 2024-04-22 LAB
INR PPP: 1.8 (ref 0.9–1.1)
PROTHROMBIN TIME: 20.4 SECONDS (ref 9.8–12.8)

## 2024-04-22 PROCEDURE — 85610 PROTHROMBIN TIME: CPT | Mod: OUT | Performed by: FAMILY MEDICINE

## 2024-04-22 PROCEDURE — 99308 SBSQ NF CARE LOW MDM 20: CPT | Performed by: FAMILY MEDICINE

## 2024-04-22 NOTE — LETTER
Patient: Janene Pinedo  : 1970    Encounter Date: 2024    Subjective  Patient ID: Janene Pinedo is a 54 y.o. female who is long term resident being seen and evaluated for multiple medical problems.    Debility, antiphospholipid syndrome, dm2, anemia, bilateral foot drop, ckd, asthma         Review of Systems   Reason unable to perform ROS: debility.       Objective  There were no vitals taken for this visit.    Physical Exam  Vitals and nursing note reviewed.   Constitutional:       Appearance: She is ill-appearing.   HENT:      Head: Normocephalic and atraumatic.      Nose: Nose normal.      Mouth/Throat:      Mouth: Mucous membranes are moist.      Pharynx: Oropharynx is clear.   Eyes:      Extraocular Movements: Extraocular movements intact.      Conjunctiva/sclera: Conjunctivae normal.      Pupils: Pupils are equal, round, and reactive to light.   Cardiovascular:      Rate and Rhythm: Normal rate and regular rhythm.      Pulses: Normal pulses.   Pulmonary:      Effort: Pulmonary effort is normal.      Breath sounds: Normal breath sounds.   Abdominal:      General: Bowel sounds are normal.      Palpations: Abdomen is soft.   Musculoskeletal:         General: Tenderness present. Normal range of motion.      Cervical back: Normal range of motion and neck supple.   Skin:     General: Skin is warm and dry.      Capillary Refill: Capillary refill takes less than 2 seconds.      Coloration: Skin is pale.   Neurological:      Mental Status: She is alert. Mental status is at baseline.      Motor: Weakness present.      Gait: Gait abnormal.   Psychiatric:         Mood and Affect: Affect is flat.         Assessment/Plan       Goals    None           Electronically Signed By: Kyra Rivera MD   24 11:39 AM

## 2024-04-23 NOTE — PROGRESS NOTES
Subjective   Patient ID: Janene Pinedo is a 54 y.o. female who is long term resident being seen and evaluated for multiple medical problems.    Debility, antiphospholipid syndrome, dm2, anemia, bilateral foot drop, ckd, asthma         Review of Systems   Reason unable to perform ROS: debility.       Objective   There were no vitals taken for this visit.    Physical Exam  Vitals and nursing note reviewed.   Constitutional:       Appearance: She is ill-appearing.   HENT:      Head: Normocephalic and atraumatic.      Nose: Nose normal.      Mouth/Throat:      Mouth: Mucous membranes are moist.      Pharynx: Oropharynx is clear.   Eyes:      Extraocular Movements: Extraocular movements intact.      Conjunctiva/sclera: Conjunctivae normal.      Pupils: Pupils are equal, round, and reactive to light.   Cardiovascular:      Rate and Rhythm: Normal rate and regular rhythm.      Pulses: Normal pulses.   Pulmonary:      Effort: Pulmonary effort is normal.      Breath sounds: Normal breath sounds.   Abdominal:      General: Bowel sounds are normal.      Palpations: Abdomen is soft.   Musculoskeletal:         General: Tenderness present. Normal range of motion.      Cervical back: Normal range of motion and neck supple.   Skin:     General: Skin is warm and dry.      Capillary Refill: Capillary refill takes less than 2 seconds.      Coloration: Skin is pale.   Neurological:      Mental Status: She is alert. Mental status is at baseline.      Motor: Weakness present.      Gait: Gait abnormal.   Psychiatric:         Mood and Affect: Affect is flat.         Assessment/Plan        Goals    None

## 2024-04-25 ENCOUNTER — LAB REQUISITION (OUTPATIENT)
Dept: LAB | Facility: HOSPITAL | Age: 54
End: 2024-04-25
Payer: MEDICARE

## 2024-04-25 DIAGNOSIS — Z79.01 LONG TERM (CURRENT) USE OF ANTICOAGULANTS: ICD-10-CM

## 2024-04-25 LAB
INR PPP: 2.3 (ref 0.9–1.1)
PROTHROMBIN TIME: 25.8 SECONDS (ref 9.8–12.8)

## 2024-04-25 PROCEDURE — 85610 PROTHROMBIN TIME: CPT | Mod: OUT | Performed by: FAMILY MEDICINE

## 2024-04-29 ENCOUNTER — LAB REQUISITION (OUTPATIENT)
Dept: LAB | Facility: HOSPITAL | Age: 54
End: 2024-04-29
Payer: MEDICARE

## 2024-04-29 DIAGNOSIS — Z79.01 LONG TERM (CURRENT) USE OF ANTICOAGULANTS: ICD-10-CM

## 2024-04-29 LAB
INR PPP: 3.3 (ref 0.9–1.1)
PROTHROMBIN TIME: 38.2 SECONDS (ref 9.8–12.8)

## 2024-04-29 PROCEDURE — 85610 PROTHROMBIN TIME: CPT | Mod: OUT | Performed by: FAMILY MEDICINE

## 2024-04-30 ENCOUNTER — APPOINTMENT (OUTPATIENT)
Dept: DIALYSIS | Facility: HOSPITAL | Age: 54
End: 2024-04-30
Payer: MEDICARE

## 2024-04-30 ENCOUNTER — HOSPITAL ENCOUNTER (OUTPATIENT)
Dept: DIALYSIS | Facility: HOSPITAL | Age: 54
Setting detail: DIALYSIS SERIES
Discharge: HOME | End: 2024-04-30
Payer: MEDICARE

## 2024-04-30 VITALS
BODY MASS INDEX: 38.49 KG/M2 | DIASTOLIC BLOOD PRESSURE: 78 MMHG | HEART RATE: 104 BPM | WEIGHT: 197.09 LBS | SYSTOLIC BLOOD PRESSURE: 128 MMHG

## 2024-04-30 DIAGNOSIS — D63.1 ANEMIA DUE TO STAGE 4 CHRONIC KIDNEY DISEASE (MULTI): ICD-10-CM

## 2024-04-30 DIAGNOSIS — N18.4 ANEMIA DUE TO STAGE 4 CHRONIC KIDNEY DISEASE (MULTI): ICD-10-CM

## 2024-04-30 DIAGNOSIS — N18.4 CHRONIC KIDNEY DISEASE, STAGE 4 (SEVERE) (MULTI): Primary | ICD-10-CM

## 2024-04-30 LAB
ALBUMIN SERPL BCP-MCNC: 3.6 G/DL (ref 3.4–5)
ANION GAP SERPL CALC-SCNC: 15 MMOL/L (ref 10–20)
BUN SERPL-MCNC: 29 MG/DL (ref 6–23)
CALCIUM SERPL-MCNC: 8.3 MG/DL (ref 8.6–10.6)
CHLORIDE SERPL-SCNC: 110 MMOL/L (ref 98–107)
CO2 SERPL-SCNC: 20 MMOL/L (ref 21–32)
CREAT SERPL-MCNC: 1.96 MG/DL (ref 0.5–1.05)
EGFRCR SERPLBLD CKD-EPI 2021: 30 ML/MIN/1.73M*2
ERYTHROCYTE [DISTWIDTH] IN BLOOD BY AUTOMATED COUNT: 15.1 % (ref 11.5–14.5)
GLUCOSE SERPL-MCNC: 116 MG/DL (ref 74–99)
HCT VFR BLD AUTO: 32.8 % (ref 36–46)
HGB BLD-MCNC: 9.9 G/DL (ref 12–16)
MCH RBC QN AUTO: 28.4 PG (ref 26–34)
MCHC RBC AUTO-ENTMCNC: 30.2 G/DL (ref 32–36)
MCV RBC AUTO: 94 FL (ref 80–100)
NRBC BLD-RTO: 0 /100 WBCS (ref 0–0)
PHOSPHATE SERPL-MCNC: 4 MG/DL (ref 2.5–4.9)
PLATELET # BLD AUTO: 147 X10*3/UL (ref 150–450)
POTASSIUM SERPL-SCNC: 3.7 MMOL/L (ref 3.5–5.3)
RBC # BLD AUTO: 3.48 X10*6/UL (ref 4–5.2)
SODIUM SERPL-SCNC: 141 MMOL/L (ref 136–145)
WBC # BLD AUTO: 4.8 X10*3/UL (ref 4.4–11.3)

## 2024-04-30 PROCEDURE — 96374 THER/PROPH/DIAG INJ IV PUSH: CPT

## 2024-04-30 PROCEDURE — 96372 THER/PROPH/DIAG INJ SC/IM: CPT | Mod: 59

## 2024-04-30 PROCEDURE — 85027 COMPLETE CBC AUTOMATED: CPT | Performed by: NURSE PRACTITIONER

## 2024-04-30 PROCEDURE — 36415 COLL VENOUS BLD VENIPUNCTURE: CPT

## 2024-04-30 PROCEDURE — 2500000004 HC RX 250 GENERAL PHARMACY W/ HCPCS (ALT 636 FOR OP/ED): Mod: JZ,JG,SE

## 2024-04-30 PROCEDURE — 80069 RENAL FUNCTION PANEL: CPT | Performed by: NURSE PRACTITIONER

## 2024-04-30 PROCEDURE — 36415 COLL VENOUS BLD VENIPUNCTURE: CPT | Performed by: NURSE PRACTITIONER

## 2024-04-30 RX ORDER — EPINEPHRINE 0.3 MG/.3ML
0.3 INJECTION SUBCUTANEOUS EVERY 5 MIN PRN
OUTPATIENT
Start: 2024-05-08

## 2024-04-30 RX ORDER — FAMOTIDINE 10 MG/ML
20 INJECTION INTRAVENOUS ONCE AS NEEDED
OUTPATIENT
Start: 2024-05-08

## 2024-04-30 RX ORDER — ALBUTEROL SULFATE 0.83 MG/ML
3 SOLUTION RESPIRATORY (INHALATION) AS NEEDED
OUTPATIENT
Start: 2024-05-08

## 2024-04-30 RX ADMIN — DARBEPOETIN ALFA 150 MCG: 150 INJECTION, SOLUTION INTRAVENOUS; SUBCUTANEOUS at 13:56

## 2024-04-30 NOTE — PROGRESS NOTES
..How is your energy? fair  How is your appetite? good  Any nausea and vomiting after eating? no  Any diarrhea or constipation no  Any SOB no  Any Edema in lower extremities? no  Any questions regarding your Kidney disease? no  DO you have a Dialysis accesses?     no

## 2024-05-02 ENCOUNTER — LAB REQUISITION (OUTPATIENT)
Dept: LAB | Facility: HOSPITAL | Age: 54
End: 2024-05-02
Payer: MEDICARE

## 2024-05-02 DIAGNOSIS — Z79.01 LONG TERM (CURRENT) USE OF ANTICOAGULANTS: ICD-10-CM

## 2024-05-02 LAB
INR PPP: 1.6 (ref 0.9–1.1)
PROTHROMBIN TIME: 18 SECONDS (ref 9.8–12.8)

## 2024-05-02 PROCEDURE — 85610 PROTHROMBIN TIME: CPT | Mod: OUT | Performed by: FAMILY MEDICINE

## 2024-05-03 ENCOUNTER — NURSING HOME VISIT (OUTPATIENT)
Dept: POST ACUTE CARE | Facility: EXTERNAL LOCATION | Age: 54
End: 2024-05-03
Payer: MEDICARE

## 2024-05-03 DIAGNOSIS — M21.371 BILATERAL FOOT-DROP: ICD-10-CM

## 2024-05-03 DIAGNOSIS — J45.40 MODERATE PERSISTENT ASTHMA, UNSPECIFIED WHETHER COMPLICATED (HHS-HCC): ICD-10-CM

## 2024-05-03 DIAGNOSIS — D68.61 ANTI-PHOSPHOLIPID SYNDROME (MULTI): ICD-10-CM

## 2024-05-03 DIAGNOSIS — E11.22 TYPE 2 DIABETES MELLITUS WITH DIABETIC CHRONIC KIDNEY DISEASE, UNSPECIFIED CKD STAGE, UNSPECIFIED WHETHER LONG TERM INSULIN USE (MULTI): ICD-10-CM

## 2024-05-03 DIAGNOSIS — N18.4 ANEMIA DUE TO STAGE 4 CHRONIC KIDNEY DISEASE (MULTI): ICD-10-CM

## 2024-05-03 DIAGNOSIS — N18.5 CKD (CHRONIC KIDNEY DISEASE), STAGE V (MULTI): Primary | ICD-10-CM

## 2024-05-03 DIAGNOSIS — D63.1 ANEMIA DUE TO STAGE 4 CHRONIC KIDNEY DISEASE (MULTI): ICD-10-CM

## 2024-05-03 DIAGNOSIS — M21.372 BILATERAL FOOT-DROP: ICD-10-CM

## 2024-05-03 DIAGNOSIS — R54 AGE-RELATED PHYSICAL DEBILITY: ICD-10-CM

## 2024-05-03 PROCEDURE — 99308 SBSQ NF CARE LOW MDM 20: CPT | Performed by: FAMILY MEDICINE

## 2024-05-03 NOTE — LETTER
Patient: Janene Pinedo  : 1970    Encounter Date: 2024    Subjective  Patient ID: Janene Pinedo is a 54 y.o. female who is long term resident being seen and evaluated for multiple medical problems.    Antiphospholipid syndrome, ASTHMA, DEBILITY, DM2, BILATERAL FOOT DROP, CKD, bilat foot drop         Review of Systems   Reason unable to perform ROS: debility.       Objective  There were no vitals taken for this visit.    Physical Exam  Vitals and nursing note reviewed.   Constitutional:       Appearance: She is ill-appearing.   HENT:      Head: Normocephalic and atraumatic.      Nose: Nose normal.      Mouth/Throat:      Mouth: Mucous membranes are moist.      Pharynx: Oropharynx is clear.   Eyes:      Extraocular Movements: Extraocular movements intact.      Conjunctiva/sclera: Conjunctivae normal.      Pupils: Pupils are equal, round, and reactive to light.   Cardiovascular:      Rate and Rhythm: Normal rate and regular rhythm.      Pulses: Normal pulses.   Pulmonary:      Effort: Pulmonary effort is normal.      Breath sounds: Normal breath sounds.   Abdominal:      General: Bowel sounds are normal.      Palpations: Abdomen is soft.   Musculoskeletal:         General: Tenderness present. Normal range of motion.      Cervical back: Normal range of motion and neck supple.   Skin:     General: Skin is warm and dry.      Capillary Refill: Capillary refill takes less than 2 seconds.      Coloration: Skin is pale.   Neurological:      General: No focal deficit present.      Mental Status: She is alert. Mental status is at baseline.      Motor: No weakness.      Gait: Gait normal.   Psychiatric:         Mood and Affect: Affect is flat.         Behavior: Behavior is slowed.         Assessment/Plan  Problem List Items Addressed This Visit             ICD-10-CM    Anemia D64.9    Anti-phospholipid syndrome (Multi) D68.61    Asthma ([facility]) J45.909    Bilateral foot-drop M21.371, M21.372    CKD (chronic  kidney disease), stage V (Multi) - Primary N18.5    Type 2 diabetes mellitus with diabetic chronic kidney disease, unspecified CKD stage, unspecified whether long term insulin use (Multi) E11.22     Other Visit Diagnoses         Codes    Age-related physical debility     R54             Goals    None           Electronically Signed By: Kyra Rivera MD   5/7/24  2:38 PM

## 2024-05-06 ENCOUNTER — LAB REQUISITION (OUTPATIENT)
Dept: LAB | Facility: HOSPITAL | Age: 54
End: 2024-05-06
Payer: MEDICARE

## 2024-05-06 DIAGNOSIS — Z79.01 LONG TERM (CURRENT) USE OF ANTICOAGULANTS: ICD-10-CM

## 2024-05-06 LAB
INR PPP: 1.9 (ref 0.9–1.1)
PROTHROMBIN TIME: 21.9 SECONDS (ref 9.8–12.8)

## 2024-05-06 PROCEDURE — 85610 PROTHROMBIN TIME: CPT | Mod: OUT | Performed by: FAMILY MEDICINE

## 2024-05-07 NOTE — PROGRESS NOTES
Subjective   Patient ID: Janene Pinedo is a 54 y.o. female who is long term resident being seen and evaluated for multiple medical problems.    Antiphospholipid syndrome, ASTHMA, DEBILITY, DM2, BILATERAL FOOT DROP, CKD, bilat foot drop         Review of Systems   Reason unable to perform ROS: debility.       Objective   There were no vitals taken for this visit.    Physical Exam  Vitals and nursing note reviewed.   Constitutional:       Appearance: She is ill-appearing.   HENT:      Head: Normocephalic and atraumatic.      Nose: Nose normal.      Mouth/Throat:      Mouth: Mucous membranes are moist.      Pharynx: Oropharynx is clear.   Eyes:      Extraocular Movements: Extraocular movements intact.      Conjunctiva/sclera: Conjunctivae normal.      Pupils: Pupils are equal, round, and reactive to light.   Cardiovascular:      Rate and Rhythm: Normal rate and regular rhythm.      Pulses: Normal pulses.   Pulmonary:      Effort: Pulmonary effort is normal.      Breath sounds: Normal breath sounds.   Abdominal:      General: Bowel sounds are normal.      Palpations: Abdomen is soft.   Musculoskeletal:         General: Tenderness present. Normal range of motion.      Cervical back: Normal range of motion and neck supple.   Skin:     General: Skin is warm and dry.      Capillary Refill: Capillary refill takes less than 2 seconds.      Coloration: Skin is pale.   Neurological:      General: No focal deficit present.      Mental Status: She is alert. Mental status is at baseline.      Motor: No weakness.      Gait: Gait normal.   Psychiatric:         Mood and Affect: Affect is flat.         Behavior: Behavior is slowed.         Assessment/Plan   Problem List Items Addressed This Visit             ICD-10-CM    Anemia D64.9    Anti-phospholipid syndrome (Multi) D68.61    Asthma ([facility]) J45.909    Bilateral foot-drop M21.371, M21.372    CKD (chronic kidney disease), stage V (Multi) - Primary N18.5    Type 2 diabetes  mellitus with diabetic chronic kidney disease, unspecified CKD stage, unspecified whether long term insulin use (Multi) E11.22     Other Visit Diagnoses         Codes    Age-related physical debility     R54             Goals    None

## 2024-05-09 ENCOUNTER — LAB REQUISITION (OUTPATIENT)
Dept: LAB | Facility: HOSPITAL | Age: 54
End: 2024-05-09
Payer: MEDICARE

## 2024-05-09 DIAGNOSIS — R11.2 NAUSEA WITH VOMITING, UNSPECIFIED: ICD-10-CM

## 2024-05-09 DIAGNOSIS — Z79.01 LONG TERM (CURRENT) USE OF ANTICOAGULANTS: ICD-10-CM

## 2024-05-09 DIAGNOSIS — N18.9 CHRONIC KIDNEY DISEASE, UNSPECIFIED: ICD-10-CM

## 2024-05-09 DIAGNOSIS — I10 ESSENTIAL (PRIMARY) HYPERTENSION: ICD-10-CM

## 2024-05-09 DIAGNOSIS — E87.6 HYPOKALEMIA: ICD-10-CM

## 2024-05-09 DIAGNOSIS — K21.9 GASTRO-ESOPHAGEAL REFLUX DISEASE WITHOUT ESOPHAGITIS: ICD-10-CM

## 2024-05-09 LAB
INR PPP: 1.7 (ref 0.9–1.1)
PROTHROMBIN TIME: 19.2 SECONDS (ref 9.8–12.8)

## 2024-05-09 PROCEDURE — 85610 PROTHROMBIN TIME: CPT | Mod: OUT | Performed by: FAMILY MEDICINE

## 2024-05-13 ENCOUNTER — LAB REQUISITION (OUTPATIENT)
Dept: LAB | Facility: HOSPITAL | Age: 54
End: 2024-05-13
Payer: MEDICARE

## 2024-05-13 DIAGNOSIS — E78.5 HYPERLIPIDEMIA, UNSPECIFIED: ICD-10-CM

## 2024-05-13 DIAGNOSIS — D64.9 ANEMIA, UNSPECIFIED: ICD-10-CM

## 2024-05-13 DIAGNOSIS — E55.9 VITAMIN D DEFICIENCY, UNSPECIFIED: ICD-10-CM

## 2024-05-13 DIAGNOSIS — N18.9 CHRONIC KIDNEY DISEASE, UNSPECIFIED: ICD-10-CM

## 2024-05-13 DIAGNOSIS — E87.6 HYPOKALEMIA: ICD-10-CM

## 2024-05-13 DIAGNOSIS — K21.9 GASTRO-ESOPHAGEAL REFLUX DISEASE WITHOUT ESOPHAGITIS: ICD-10-CM

## 2024-05-13 LAB
INR PPP: 1.7 (ref 0.9–1.1)
PROTHROMBIN TIME: 19.4 SECONDS (ref 9.8–12.8)

## 2024-05-13 PROCEDURE — 85610 PROTHROMBIN TIME: CPT | Mod: OUT | Performed by: FAMILY MEDICINE

## 2024-05-16 ENCOUNTER — LAB REQUISITION (OUTPATIENT)
Dept: LAB | Facility: HOSPITAL | Age: 54
End: 2024-05-16
Payer: MEDICARE

## 2024-05-16 DIAGNOSIS — N18.9 CHRONIC KIDNEY DISEASE, UNSPECIFIED: ICD-10-CM

## 2024-05-16 DIAGNOSIS — E78.5 HYPERLIPIDEMIA, UNSPECIFIED: ICD-10-CM

## 2024-05-16 DIAGNOSIS — K21.9 GASTRO-ESOPHAGEAL REFLUX DISEASE WITHOUT ESOPHAGITIS: ICD-10-CM

## 2024-05-16 DIAGNOSIS — E87.5 HYPERKALEMIA: ICD-10-CM

## 2024-05-16 DIAGNOSIS — E55.9 VITAMIN D DEFICIENCY, UNSPECIFIED: ICD-10-CM

## 2024-05-16 LAB
INR PPP: 2.3 (ref 0.9–1.1)
PROTHROMBIN TIME: 26.2 SECONDS (ref 9.8–12.8)

## 2024-05-16 PROCEDURE — 85610 PROTHROMBIN TIME: CPT | Mod: OUT | Performed by: FAMILY MEDICINE

## 2024-05-20 ENCOUNTER — LAB REQUISITION (OUTPATIENT)
Dept: LAB | Facility: HOSPITAL | Age: 54
End: 2024-05-20
Payer: MEDICARE

## 2024-05-20 DIAGNOSIS — Z79.01 LONG TERM (CURRENT) USE OF ANTICOAGULANTS: ICD-10-CM

## 2024-05-20 LAB
INR PPP: 3.3 (ref 0.9–1.1)
PROTHROMBIN TIME: 37.4 SECONDS (ref 9.8–12.8)

## 2024-05-20 PROCEDURE — 85610 PROTHROMBIN TIME: CPT | Mod: OUT | Performed by: FAMILY MEDICINE

## 2024-05-21 ENCOUNTER — APPOINTMENT (OUTPATIENT)
Dept: DIALYSIS | Facility: HOSPITAL | Age: 54
End: 2024-05-21
Payer: MEDICARE

## 2024-05-23 ENCOUNTER — LAB REQUISITION (OUTPATIENT)
Dept: LAB | Facility: HOSPITAL | Age: 54
End: 2024-05-23
Payer: MEDICARE

## 2024-05-23 DIAGNOSIS — Z79.01 LONG TERM (CURRENT) USE OF ANTICOAGULANTS: ICD-10-CM

## 2024-05-23 DIAGNOSIS — Z86.718 PERSONAL HISTORY OF OTHER VENOUS THROMBOSIS AND EMBOLISM: ICD-10-CM

## 2024-05-23 LAB
INR PPP: 1.4 (ref 0.9–1.1)
PROTHROMBIN TIME: 16.2 SECONDS (ref 9.8–12.8)

## 2024-05-23 PROCEDURE — 85610 PROTHROMBIN TIME: CPT | Mod: OUT | Performed by: FAMILY MEDICINE

## 2024-05-27 ENCOUNTER — APPOINTMENT (OUTPATIENT)
Dept: DIALYSIS | Facility: HOSPITAL | Age: 54
End: 2024-05-27
Payer: MEDICARE

## 2024-05-31 ENCOUNTER — APPOINTMENT (OUTPATIENT)
Dept: DIALYSIS | Facility: HOSPITAL | Age: 54
End: 2024-05-31
Payer: MEDICARE

## 2024-06-03 ENCOUNTER — LAB REQUISITION (OUTPATIENT)
Dept: LAB | Facility: HOSPITAL | Age: 54
End: 2024-06-03
Payer: MEDICARE

## 2024-06-03 ENCOUNTER — OFFICE VISIT (OUTPATIENT)
Dept: UROLOGY | Facility: CLINIC | Age: 54
End: 2024-06-03
Payer: MEDICARE

## 2024-06-03 DIAGNOSIS — Z79.01 LONG TERM (CURRENT) USE OF ANTICOAGULANTS: ICD-10-CM

## 2024-06-03 DIAGNOSIS — N18.4 STAGE 4 CHRONIC KIDNEY DISEASE (MULTI): ICD-10-CM

## 2024-06-03 DIAGNOSIS — K68.3 RETROPERITONEAL HEMATOMA: ICD-10-CM

## 2024-06-03 DIAGNOSIS — N26.1 RENAL ATROPHY, LEFT: Primary | ICD-10-CM

## 2024-06-03 LAB
INR PPP: 4.1 (ref 0.9–1.1)
PROTHROMBIN TIME: 46.5 SECONDS (ref 9.8–12.8)

## 2024-06-03 PROCEDURE — 99213 OFFICE O/P EST LOW 20 MIN: CPT | Performed by: STUDENT IN AN ORGANIZED HEALTH CARE EDUCATION/TRAINING PROGRAM

## 2024-06-03 PROCEDURE — 1036F TOBACCO NON-USER: CPT | Performed by: STUDENT IN AN ORGANIZED HEALTH CARE EDUCATION/TRAINING PROGRAM

## 2024-06-03 PROCEDURE — 4010F ACE/ARB THERAPY RXD/TAKEN: CPT | Performed by: STUDENT IN AN ORGANIZED HEALTH CARE EDUCATION/TRAINING PROGRAM

## 2024-06-03 PROCEDURE — 85610 PROTHROMBIN TIME: CPT | Mod: OUT | Performed by: FAMILY MEDICINE

## 2024-06-03 PROCEDURE — 3060F POS MICROALBUMINURIA REV: CPT | Performed by: STUDENT IN AN ORGANIZED HEALTH CARE EDUCATION/TRAINING PROGRAM

## 2024-06-03 PROCEDURE — 36415 COLL VENOUS BLD VENIPUNCTURE: CPT | Performed by: FAMILY MEDICINE

## 2024-06-03 NOTE — PROGRESS NOTES
Scribed for Dr. Ian Avalos by Abdulkadir Drummond. I, Dr. Ian Avalos have personally reviewed and agreed with the information entered by the Virtual Scribe. 06/03/24.    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    No additional testing or treatment is recommended at this time.  Continue to follow up with Nephrology for management of her CKD//MGN.   Opts to continue surveillance; RTC in 1 year with a RBUS prior to.     #L flank pain  Reports some persistent L sided discomfort.   Occasionally radiates to her tailbone.   Occurs daily, typically whenever she sneezes.  Attributes to potentially being 2/2 her chronic spinal/back/arthritic issues.     Discussed non-surgical vs surgical options including pyeloplasty.   However given potential risks of surgery; considered risks likely outweigh potential benefit.   She opts to continue surveillance for now.   Offered pain management referral; patient declined.  Noting she had followed up with pain management previously.  Was treated with steroids, which did not improve her pain therefore declined the referral today.      All questions were answered to the patient’s satisfaction.  Patient agrees with the plan and wishes to proceed.  Continue follow-up for ongoing care of her chronic medical conditions.       History of Present Illness (HPI):  Janene presents for an annual follow up visit.  The patient’s EMR has been reviewed.  Lives in [place], [place].  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  Her hydronephrosis had resolved; albeit c/o persistent L flank pain.  Pain has been well-controlled on toradol PRN at night.   Continues follow up with Nephrology for her CKD.     TODAY: (06/03/24)  Reports she has been doing well.   Albeit, still experiencing L flank pain.   Reports daily occurrences; typically whenever she sneezes.  Attributes to potentially being 2/2 her chronic spinal/back/arthritic issues.   Denies any recent infections, gross hematuria, fevers or chills.   Latest CT results reviewed with patient.     CT A&P (10/19/23) personally reviewed.   LEFT kidney again shows coarse calcifications or vascular coils with some atrophy. Considered stable, no change from the prior study.   RIGHT kidney remains normal.  Urinary bladder is unremarkable.     TO REVIEW: Last visit (06/05/23)  CT A&P wo contrast (05/25/23) personally reviewed and demonstrated:  Stable postoperative changes of her LEFT kidney;  With diffuse atrophy and multifocal cortical scarring.   LEFT hydronephrosis now resolved. No stones.  Interval resolution of previously noted LLQ abscess (since 11/14/22) with linear residual scarring along the LL mesentery.     CT findings shared with patient today and was reassured that the hydronephrosis has resolved and her kidneys do not appear obstructed.      Patient reports persistent L sided flank discomfort that radiates to her tailbone.   Otherwise denies any recent UTIs, dysuria, gross hematuria, nausea, vomiting, fevers or chills.     TO REVIEW: Initial visit  Hx of of developing a spontaneous L perinephric hematoma requiring admission in 2021. Reports acute onset of L flank discomfort since Jan 2023. Gradually worsening in severity and with extending inferiorly to her sacrum.    She compares her current pain to being similar to when she was admitted for same.  Presently, her pain is well controlled with taking toradol PRN at night before going to sleep. Denies any history of stones.      RBUS (23) personally reviewed:   Noted significant hydronephrosis bilaterally;  Left > right.     PMH: HTN, anemia, diabetes, asthma bronchitis, cataracts bilaterally, eye pain, dizziness.  FH: of breast and lung cancer (Mother),   Denies FHx of  malignancy.  SH: Non-smoker.    Past Medical History:   Diagnosis Date    Body mass index (BMI) 32.0-32.9, adult     BMI 32.0-32.9,adult    Body mass index (BMI) 32.0-32.9, adult     BMI 32.0-32.9,adult    Body mass index (BMI) 35.0-35.9, adult     BMI 35.0-35.9,adult    Migraine, unspecified, not intractable, without status migrainosus 2020    Migraine, unspecified, not intractable, without status migrainosus    Nausea 2015    Nausea    Other specified health status     No pertinent past medical history    Other specified health status     No pertinent past surgical history    Unspecified asthma, uncomplicated ([facility]) 2022    Asthma     Past Surgical History:   Procedure Laterality Date    IR EMBOLIZATION LYMPH NODE Bilateral 2021    IR EMBOLIZATION LYMPH NODE [place] [facility]    TONSILLECTOMY  2016    Tonsillectomy     Family History   Problem Relation Name Age of Onset    Arthritis Mother      Diabetes Mother      Hypertension Mother      Cancer Mother      Breast cancer Mother      Migraines Mother      Arthritis Father      Hypertension Father      Cancer Maternal Grandmother      Breast cancer Maternal Grandmother      Other (primary cervical cancer) Maternal Grandmother      Alzheimer's disease Paternal Grandmother      Stroke Paternal Grandmother       Social History     Tobacco Use   Smoking Status Former    Current packs/day: 0.00    Types: Cigarettes    Quit date:     Years since quittin.4   Smokeless Tobacco Never     Current Outpatient Medications   Medication Sig Dispense  Refill    albuterol 2.5 mg /3 mL (0.083 %) nebulizer solution Take 3 mL (2.5 mg) by nebulization every 6 hours if needed for wheezing.      albuterol 90 mcg/actuation inhaler Inhale 2 puffs every 6 hours if needed for wheezing. 18 g 5    allopurinol (Zyloprim) 100 mg tablet TAKE ONE TABLET BY MOUTH EVERY DAY 30 tablet 0    amitriptyline (Elavil) 50 mg tablet Take 1 tablet (50 mg) by mouth once daily at bedtime. 90 tablet 1    darbepoetin alva (Aranesp, in polysorbate,) syringe Inject 0.4 mL (10 mcg) under the skin 1 time.      ferrous sulfate 325 (65 Fe) MG tablet Take 1 tablet by mouth once daily.      fluticasone (Flonase) 50 mcg/actuation nasal spray Administer 1 spray into each nostril once daily. Shake gently. Before first use, prime pump. After use, clean tip and replace cap. 16 g 5    folic acid (Folvite) 1 mg tablet Take 1 tablet (1 mg) by mouth once daily.      furosemide (Lasix) 40 mg tablet Take 1 tablet (40 mg) by mouth once daily. 90 tablet 1    gabapentin (Neurontin) 100 mg capsule Take 1 capsule (100 mg) by mouth once daily at bedtime. 90 capsule 1    hydroCHLOROthiazide (HYDRODiuril) 25 mg tablet Take 1 tablet (25 mg) by mouth once daily.      Jantoven 2.5 mg tablet Take 2 tablets (5 mg) by mouth once daily at bedtime. Dose fluctuates with INR      leg brace (Knee Brace Large-XLarge) misc       levothyroxine (Synthroid, Levoxyl) 25 mcg tablet Take 1 tablet (25 mcg) by mouth once daily.      lisinopril 20 mg tablet Take 1 tablet (20 mg) by mouth once daily.      melatonin 3 mg capsule Take by mouth.      ondansetron ODT (Zofran-ODT) 4 mg disintegrating tablet Take by mouth.      pantoprazole (ProtoNix) 40 mg EC tablet Take by mouth.      potassium chloride CR (Klor-Con M20) 20 mEq ER tablet TAKE ONE TABLET BY MOUTH TWO TIMES A DAY or as directed 180 tablet 1    pravastatin (Pravachol) 40 mg tablet Take 1 tablet (40 mg) by mouth once daily. 90 tablet 1    SITagliptin phosphate (Januvia) 25 mg tablet  Take 1 tablet (25 mg) by mouth once daily. 90 tablet 1    sodium bicarbonate 650 mg tablet Take 2 tablets (1,300 mg) by mouth 2 times a day. 360 tablet 3    Symbicort 160-4.5 mcg/actuation inhaler INHALE TWO PUFFS BY MOUTH TWO TIMES A DAY.  rinse mouth after use 10.2 g 0    topiramate (Topamax) 100 mg tablet Take 1 tablet (100 mg) by mouth 2 times a day. 180 tablet 1    topiramate (Topamax) 25 mg tablet Take 1 tablet (25 mg) by mouth once daily. 90 tablet 1    vit B complex no.12/niacin,B3, (VITAMIN B COMPLEX NO.12-NIACIN ORAL) Take 1 tablet by mouth once daily.      vitamin E 180 mg (400 unit) capsule 1 po every day. 90 capsule 1     No current facility-administered medications for this visit.     Allergies   Allergen Reactions    Diphenhydramine Hcl Swelling    Erythromycin Hives and GI Upset    Humibid Dm [Dextromethorphan-Pot Guaiac-Gg] Unknown     Past medical, surgical, family and social history in the chart was reviewed and is accurate including any additions to what is in this HPI.    REVIEW OF SYSTEMS (ROS):   Constitutional: denies any unintentional weight loss or change in strength.  Integumentary: denies any rashes or pruritus.  Eyes: denies any double vision or eye pain.  Ear/Nose/Mouth/Throat: denies any nosebleeds or gum bleeds.  Cardiovascular: denies any chest pain or syncope.  Respiratory: denies hemoptysis.  Gastrointestinal: denies nausea or vomiting.  Musculoskeletal: denies muscle cramping or weakness.  Neurologic: denies convulsions or seizures.  Hematologic/Lymphatic: denies bleeding tendencies.  Endocrine: denies heat/cold intolerance.  All other systems have been reviewed and are negative unless otherwise noted in the HPI.     OBJECTIVE:  There were no vitals taken for this visit.  PHYSICAL EXAM:  Constitutional: No obvious distress.  Eyes: Non-injected conjunctiva, sclera clear, EOMI.  Ears/Nose/Mouth/Throat: No obvious drainage per ears or nose.  Cardiovascular: Extremities are warm and  well perfused. No edema, cyanosis or pallor.  Respiratory: No audible wheezing/stridor; respirations do not appear labored.  Gastrointestinal: Abdomen soft, not distended.  Musculoskeletal: Normal ROM of extremities.  Skin: No obvious rashes or open sores.  Neurologic: Alert and oriented, CN 2-12 grossly intact.  Psychiatric: Answers questions appropriately with normal affect.  Hematologic/Lymphatic/Immunologic: No obvious bruises or sites of spontaneous bleeding.  Genitourinary: No CVA tenderness, bladder not palpable.     LABS & IMAGING:  Lab Results   Component Value Date    WBC 4.8 04/30/2024    HGB 9.9 (L) 04/30/2024    HCT 32.8 (L) 04/30/2024     (L) 04/30/2024    CHOL 303 (H) 12/07/2023    TRIG 595 (H) 12/07/2023    HDL 44.2 12/07/2023    ALT 7 12/07/2023    AST 11 12/07/2023     04/30/2024    K 3.7 04/30/2024     (H) 04/30/2024    CREATININE 1.96 (H) 04/30/2024    BUN 29 (H) 04/30/2024    CO2 20 (L) 04/30/2024    TSH 1.42 12/07/2023    INR 4.1 (H) 06/03/2024    HGBA1C 4.8 12/07/2023     CT A&P (10/19/23)  The pancreatitis in the prior study appears to have resolved. The  pancreas is mildly atrophic but there is no residual edema, pseudocyst  or calcification.    The left kidney again shows coarse calcifications or vascular coils  with some atrophy but there is no change from the prior study. The  right kidney remains normal and the urinary bladder is unremarkable.    There are multiple subcutaneous nodular densities over the lower  abdomen and pelvis bilaterally. These are new from the prior study and  may represent multiple injection sites.    Scribed for Dr. Ian Avalos by Abdulkadir Drummond.  I, Dr. Ian Avalos have personally reviewed and agreed with the information entered by the Virtual Scribe. 06/03/24.

## 2024-06-06 ENCOUNTER — HOSPITAL ENCOUNTER (OUTPATIENT)
Dept: DIALYSIS | Facility: HOSPITAL | Age: 54
Setting detail: DIALYSIS SERIES
Discharge: HOME | End: 2024-06-06
Payer: MEDICARE

## 2024-06-06 ENCOUNTER — LAB REQUISITION (OUTPATIENT)
Dept: LAB | Facility: HOSPITAL | Age: 54
End: 2024-06-06
Payer: MEDICARE

## 2024-06-06 VITALS
DIASTOLIC BLOOD PRESSURE: 89 MMHG | HEART RATE: 91 BPM | SYSTOLIC BLOOD PRESSURE: 146 MMHG | BODY MASS INDEX: 40.95 KG/M2 | WEIGHT: 209.66 LBS

## 2024-06-06 DIAGNOSIS — N18.4 ANEMIA DUE TO STAGE 4 CHRONIC KIDNEY DISEASE (MULTI): Primary | ICD-10-CM

## 2024-06-06 DIAGNOSIS — N18.4 CHRONIC KIDNEY DISEASE, STAGE 4 (SEVERE) (MULTI): ICD-10-CM

## 2024-06-06 DIAGNOSIS — D63.1 ANEMIA DUE TO STAGE 4 CHRONIC KIDNEY DISEASE (MULTI): Primary | ICD-10-CM

## 2024-06-06 DIAGNOSIS — Z86.718 PERSONAL HISTORY OF OTHER VENOUS THROMBOSIS AND EMBOLISM: ICD-10-CM

## 2024-06-06 LAB
ALBUMIN SERPL BCP-MCNC: 3.7 G/DL (ref 3.4–5)
ANION GAP SERPL CALC-SCNC: 14 MMOL/L (ref 10–20)
BUN SERPL-MCNC: 23 MG/DL (ref 6–23)
CALCIUM SERPL-MCNC: 8.9 MG/DL (ref 8.6–10.6)
CHLORIDE SERPL-SCNC: 110 MMOL/L (ref 98–107)
CO2 SERPL-SCNC: 21 MMOL/L (ref 21–32)
CREAT SERPL-MCNC: 1.58 MG/DL (ref 0.5–1.05)
EGFRCR SERPLBLD CKD-EPI 2021: 39 ML/MIN/1.73M*2
ERYTHROCYTE [DISTWIDTH] IN BLOOD BY AUTOMATED COUNT: 15 % (ref 11.5–14.5)
GLUCOSE SERPL-MCNC: 117 MG/DL (ref 74–99)
HCT VFR BLD AUTO: 29.9 % (ref 36–46)
HGB BLD-MCNC: 10.2 G/DL (ref 12–16)
INR PPP: 1.2 (ref 0.9–1.1)
MCH RBC QN AUTO: 29.9 PG (ref 26–34)
MCHC RBC AUTO-ENTMCNC: 34.1 G/DL (ref 32–36)
MCV RBC AUTO: 88 FL (ref 80–100)
NRBC BLD-RTO: 0 /100 WBCS (ref 0–0)
PHOSPHATE SERPL-MCNC: 3.8 MG/DL (ref 2.5–4.9)
PLATELET # BLD AUTO: 177 X10*3/UL (ref 150–450)
POTASSIUM SERPL-SCNC: 4 MMOL/L (ref 3.5–5.3)
PROTHROMBIN TIME: 14 SECONDS (ref 9.8–12.8)
RBC # BLD AUTO: 3.41 X10*6/UL (ref 4–5.2)
SODIUM SERPL-SCNC: 141 MMOL/L (ref 136–145)
WBC # BLD AUTO: 7.2 X10*3/UL (ref 4.4–11.3)

## 2024-06-06 PROCEDURE — 85027 COMPLETE CBC AUTOMATED: CPT | Performed by: NURSE PRACTITIONER

## 2024-06-06 PROCEDURE — 96374 THER/PROPH/DIAG INJ IV PUSH: CPT

## 2024-06-06 PROCEDURE — 2500000004 HC RX 250 GENERAL PHARMACY W/ HCPCS (ALT 636 FOR OP/ED): Mod: JZ,JG

## 2024-06-06 PROCEDURE — 36415 COLL VENOUS BLD VENIPUNCTURE: CPT | Performed by: FAMILY MEDICINE

## 2024-06-06 PROCEDURE — 96372 THER/PROPH/DIAG INJ SC/IM: CPT

## 2024-06-06 PROCEDURE — 84100 ASSAY OF PHOSPHORUS: CPT | Performed by: NURSE PRACTITIONER

## 2024-06-06 PROCEDURE — 36415 COLL VENOUS BLD VENIPUNCTURE: CPT

## 2024-06-06 PROCEDURE — 36415 COLL VENOUS BLD VENIPUNCTURE: CPT | Performed by: NURSE PRACTITIONER

## 2024-06-06 PROCEDURE — 85610 PROTHROMBIN TIME: CPT | Mod: OUT | Performed by: FAMILY MEDICINE

## 2024-06-06 RX ORDER — ALBUTEROL SULFATE 0.83 MG/ML
3 SOLUTION RESPIRATORY (INHALATION) AS NEEDED
OUTPATIENT
Start: 2024-06-25

## 2024-06-06 RX ORDER — EPINEPHRINE 0.3 MG/.3ML
0.3 INJECTION SUBCUTANEOUS EVERY 5 MIN PRN
OUTPATIENT
Start: 2024-06-25

## 2024-06-06 RX ORDER — FAMOTIDINE 10 MG/ML
20 INJECTION INTRAVENOUS ONCE AS NEEDED
OUTPATIENT
Start: 2024-06-25

## 2024-06-06 RX ADMIN — DARBEPOETIN ALFA 150 MCG: 150 INJECTION, SOLUTION INTRAVENOUS; SUBCUTANEOUS at 13:32

## 2024-06-06 ASSESSMENT — PAIN SCALES - GENERAL: PAINLEVEL: 4

## 2024-06-06 NOTE — PROGRESS NOTES
..How is your energy?  Very Tired  How is your appetite? Very hungry, life stressors, father past away recently   Any nausea and vomiting after eating? None  Any diarrhea or constipation ? None  Any SOB? None   Any Edema in lower extremities? BLE edema +1-2 trace  Any questions regarding your Kidney disease? No  DO you have a Dialysis accesses?      No       Obtained labs and gave 150 mcg aranesp in right upper arm   No complaints, tolerated procedure

## 2024-06-10 ENCOUNTER — LAB REQUISITION (OUTPATIENT)
Dept: LAB | Facility: HOSPITAL | Age: 54
End: 2024-06-10
Payer: COMMERCIAL

## 2024-06-10 DIAGNOSIS — Z79.01 LONG TERM (CURRENT) USE OF ANTICOAGULANTS: ICD-10-CM

## 2024-06-10 LAB
INR PPP: 1.1 (ref 0.9–1.1)
PROTHROMBIN TIME: 12.1 SECONDS (ref 9.8–12.8)

## 2024-06-10 PROCEDURE — 36415 COLL VENOUS BLD VENIPUNCTURE: CPT | Performed by: FAMILY MEDICINE

## 2024-06-10 PROCEDURE — 85610 PROTHROMBIN TIME: CPT | Mod: OUT | Performed by: FAMILY MEDICINE

## 2024-06-11 ENCOUNTER — LAB REQUISITION (OUTPATIENT)
Dept: LAB | Facility: HOSPITAL | Age: 54
End: 2024-06-11
Payer: COMMERCIAL

## 2024-06-11 DIAGNOSIS — N18.9 CHRONIC KIDNEY DISEASE, UNSPECIFIED: ICD-10-CM

## 2024-06-11 LAB
ALBUMIN SERPL BCP-MCNC: 3.5 G/DL (ref 3.4–5)
ANION GAP SERPL CALC-SCNC: 13 MMOL/L (ref 10–20)
APPEARANCE UR: CLEAR
BACTERIA #/AREA URNS AUTO: ABNORMAL /HPF
BILIRUB UR STRIP.AUTO-MCNC: NEGATIVE MG/DL
BUN SERPL-MCNC: 26 MG/DL (ref 6–23)
CALCIUM SERPL-MCNC: 8.6 MG/DL (ref 8.6–10.3)
CHLORIDE SERPL-SCNC: 108 MMOL/L (ref 98–107)
CO2 SERPL-SCNC: 24 MMOL/L (ref 21–32)
COLOR UR: COLORLESS
CREAT SERPL-MCNC: 1.67 MG/DL (ref 0.5–1.05)
CREAT UR-MCNC: 32 MG/DL (ref 20–320)
CREAT UR-MCNC: 32 MG/DL (ref 20–320)
EGFRCR SERPLBLD CKD-EPI 2021: 36 ML/MIN/1.73M*2
ERYTHROCYTE [DISTWIDTH] IN BLOOD BY AUTOMATED COUNT: 15.9 % (ref 11.5–14.5)
GLUCOSE SERPL-MCNC: 114 MG/DL (ref 74–99)
GLUCOSE UR STRIP.AUTO-MCNC: ABNORMAL MG/DL
HCT VFR BLD AUTO: 31.5 % (ref 36–46)
HGB BLD-MCNC: 10.1 G/DL (ref 12–16)
KETONES UR STRIP.AUTO-MCNC: NEGATIVE MG/DL
LEUKOCYTE ESTERASE UR QL STRIP.AUTO: NEGATIVE
MCH RBC QN AUTO: 30.6 PG (ref 26–34)
MCHC RBC AUTO-ENTMCNC: 32.1 G/DL (ref 32–36)
MCV RBC AUTO: 96 FL (ref 80–100)
MICROALBUMIN UR-MCNC: 594.8 MG/L
MICROALBUMIN/CREAT UR: 1858.8 UG/MG CREAT
NITRITE UR QL STRIP.AUTO: NEGATIVE
NRBC BLD-RTO: 0.4 /100 WBCS (ref 0–0)
PH UR STRIP.AUTO: 7 [PH]
PHOSPHATE SERPL-MCNC: 4.2 MG/DL (ref 2.5–4.9)
PLATELET # BLD AUTO: 196 X10*3/UL (ref 150–450)
POTASSIUM SERPL-SCNC: 3.8 MMOL/L (ref 3.5–5.3)
PROT UR STRIP.AUTO-MCNC: ABNORMAL MG/DL
PROT UR-ACNC: 98 MG/DL (ref 5–24)
PROT/CREAT UR: 3.06 MG/MG CREAT (ref 0–0.17)
RBC # BLD AUTO: 3.3 X10*6/UL (ref 4–5.2)
RBC # UR STRIP.AUTO: NEGATIVE /UL
RBC #/AREA URNS AUTO: ABNORMAL /HPF
SODIUM SERPL-SCNC: 141 MMOL/L (ref 136–145)
SP GR UR STRIP.AUTO: 1.01
SQUAMOUS #/AREA URNS AUTO: ABNORMAL /HPF
UROBILINOGEN UR STRIP.AUTO-MCNC: NORMAL MG/DL
WBC # BLD AUTO: 7.2 X10*3/UL (ref 4.4–11.3)
WBC #/AREA URNS AUTO: ABNORMAL /HPF

## 2024-06-11 PROCEDURE — 81001 URINALYSIS AUTO W/SCOPE: CPT | Mod: OUT | Performed by: FAMILY MEDICINE

## 2024-06-11 PROCEDURE — 87086 URINE CULTURE/COLONY COUNT: CPT | Mod: OUT,PARLAB | Performed by: FAMILY MEDICINE

## 2024-06-11 PROCEDURE — 82043 UR ALBUMIN QUANTITATIVE: CPT | Mod: OUT | Performed by: FAMILY MEDICINE

## 2024-06-11 PROCEDURE — 82570 ASSAY OF URINE CREATININE: CPT | Mod: OUT | Performed by: FAMILY MEDICINE

## 2024-06-11 PROCEDURE — 36415 COLL VENOUS BLD VENIPUNCTURE: CPT | Performed by: FAMILY MEDICINE

## 2024-06-11 PROCEDURE — 84156 ASSAY OF PROTEIN URINE: CPT | Mod: OUT | Performed by: FAMILY MEDICINE

## 2024-06-11 PROCEDURE — 85027 COMPLETE CBC AUTOMATED: CPT | Mod: OUT | Performed by: FAMILY MEDICINE

## 2024-06-11 PROCEDURE — 80069 RENAL FUNCTION PANEL: CPT | Mod: OUT | Performed by: FAMILY MEDICINE

## 2024-06-12 ENCOUNTER — LAB (OUTPATIENT)
Dept: LAB | Facility: LAB | Age: 54
End: 2024-06-12
Payer: MEDICARE

## 2024-06-12 ENCOUNTER — APPOINTMENT (OUTPATIENT)
Dept: NEPHROLOGY | Facility: CLINIC | Age: 54
End: 2024-06-12
Payer: MEDICARE

## 2024-06-12 VITALS
WEIGHT: 209.4 LBS | HEIGHT: 60 IN | TEMPERATURE: 97.2 F | DIASTOLIC BLOOD PRESSURE: 89 MMHG | HEART RATE: 96 BPM | OXYGEN SATURATION: 97 % | SYSTOLIC BLOOD PRESSURE: 146 MMHG | BODY MASS INDEX: 41.11 KG/M2

## 2024-06-12 DIAGNOSIS — N18.4 STAGE 4 CHRONIC KIDNEY DISEASE (MULTI): ICD-10-CM

## 2024-06-12 DIAGNOSIS — R60.0 LEG EDEMA: ICD-10-CM

## 2024-06-12 LAB — BACTERIA UR CULT: NORMAL

## 2024-06-12 PROCEDURE — 36415 COLL VENOUS BLD VENIPUNCTURE: CPT

## 2024-06-12 PROCEDURE — 4010F ACE/ARB THERAPY RXD/TAKEN: CPT | Performed by: INTERNAL MEDICINE

## 2024-06-12 PROCEDURE — 3079F DIAST BP 80-89 MM HG: CPT | Performed by: INTERNAL MEDICINE

## 2024-06-12 PROCEDURE — 86256 FLUORESCENT ANTIBODY TITER: CPT

## 2024-06-12 PROCEDURE — 1036F TOBACCO NON-USER: CPT | Performed by: INTERNAL MEDICINE

## 2024-06-12 PROCEDURE — 86255 FLUORESCENT ANTIBODY SCREEN: CPT

## 2024-06-12 PROCEDURE — 99213 OFFICE O/P EST LOW 20 MIN: CPT | Performed by: INTERNAL MEDICINE

## 2024-06-12 PROCEDURE — 3077F SYST BP >= 140 MM HG: CPT | Performed by: INTERNAL MEDICINE

## 2024-06-12 PROCEDURE — 3062F POS MACROALBUMINURIA REV: CPT | Performed by: INTERNAL MEDICINE

## 2024-06-12 RX ORDER — FUROSEMIDE 40 MG/1
40 TABLET ORAL
Qty: 180 TABLET | Refills: 3 | Status: SHIPPED | OUTPATIENT
Start: 2024-06-12 | End: 2025-06-12

## 2024-06-12 NOTE — PROGRESS NOTES
Chief Complaint: Follow up CKD    Notes pain over kidneys  Denies nausea, vomiting, chest pain, dyspnea  No urinary symptoms    NAD  Sclera AI s inj  MMM, no sores  Deferred secondary to COVID  L>R LE edema  No tremor  No rash    CKD stage 4  Volume overload  HTN in clinic today, BP usually well controlled  Proteinuria 3 g better    PLAR2 antibody today   Increase diuretic to twice daily; follow weights  Low salt diet  RFP in 2 weeks  Ultrasound kidneys  Labs and follow up in one month

## 2024-06-13 ENCOUNTER — LAB REQUISITION (OUTPATIENT)
Dept: LAB | Facility: HOSPITAL | Age: 54
End: 2024-06-13
Payer: COMMERCIAL

## 2024-06-13 DIAGNOSIS — N17.9 ACUTE KIDNEY FAILURE, UNSPECIFIED (CMS-HCC): ICD-10-CM

## 2024-06-13 DIAGNOSIS — Z79.01 LONG TERM (CURRENT) USE OF ANTICOAGULANTS: ICD-10-CM

## 2024-06-13 LAB
INR PPP: 1.5 (ref 0.9–1.1)
PROTHROMBIN TIME: 17 SECONDS (ref 9.8–12.8)

## 2024-06-13 PROCEDURE — 85610 PROTHROMBIN TIME: CPT | Mod: OUT | Performed by: FAMILY MEDICINE

## 2024-06-16 LAB
PLA2R IGG SERPL QL IF: DETECTED
PLA2R IGG TITR SER IF: ABNORMAL {TITER}

## 2024-06-17 ENCOUNTER — LAB REQUISITION (OUTPATIENT)
Dept: LAB | Facility: HOSPITAL | Age: 54
End: 2024-06-17
Payer: COMMERCIAL

## 2024-06-17 DIAGNOSIS — Z79.01 LONG TERM (CURRENT) USE OF ANTICOAGULANTS: ICD-10-CM

## 2024-06-17 LAB
INR PPP: 1.9 (ref 0.9–1.1)
PROTHROMBIN TIME: 21.7 SECONDS (ref 9.8–12.8)

## 2024-06-17 PROCEDURE — 85610 PROTHROMBIN TIME: CPT | Mod: OUT | Performed by: FAMILY MEDICINE

## 2024-06-20 ENCOUNTER — LAB REQUISITION (OUTPATIENT)
Dept: LAB | Facility: HOSPITAL | Age: 54
End: 2024-06-20
Payer: COMMERCIAL

## 2024-06-20 DIAGNOSIS — Z79.01 LONG TERM (CURRENT) USE OF ANTICOAGULANTS: ICD-10-CM

## 2024-06-20 LAB
INR PPP: 1.9 (ref 0.9–1.1)
PROTHROMBIN TIME: 21.8 SECONDS (ref 9.8–12.8)

## 2024-06-20 PROCEDURE — 85610 PROTHROMBIN TIME: CPT | Mod: OUT | Performed by: FAMILY MEDICINE

## 2024-06-21 PROCEDURE — 36415 COLL VENOUS BLD VENIPUNCTURE: CPT | Performed by: FAMILY MEDICINE

## 2024-06-24 ENCOUNTER — APPOINTMENT (OUTPATIENT)
Dept: DIALYSIS | Facility: HOSPITAL | Age: 54
End: 2024-06-24
Payer: COMMERCIAL

## 2024-06-24 ENCOUNTER — LAB REQUISITION (OUTPATIENT)
Dept: LAB | Facility: HOSPITAL | Age: 54
End: 2024-06-24
Payer: COMMERCIAL

## 2024-06-24 DIAGNOSIS — E03.9 HYPOTHYROIDISM, UNSPECIFIED: ICD-10-CM

## 2024-06-24 DIAGNOSIS — E78.5 HYPERLIPIDEMIA, UNSPECIFIED: ICD-10-CM

## 2024-06-24 DIAGNOSIS — F41.9 ANXIETY DISORDER, UNSPECIFIED: ICD-10-CM

## 2024-06-24 DIAGNOSIS — K21.9 GASTRO-ESOPHAGEAL REFLUX DISEASE WITHOUT ESOPHAGITIS: ICD-10-CM

## 2024-06-24 DIAGNOSIS — D64.9 ANEMIA, UNSPECIFIED: ICD-10-CM

## 2024-06-24 DIAGNOSIS — N18.9 CHRONIC KIDNEY DISEASE, UNSPECIFIED: ICD-10-CM

## 2024-06-24 DIAGNOSIS — E55.9 VITAMIN D DEFICIENCY, UNSPECIFIED: ICD-10-CM

## 2024-06-24 DIAGNOSIS — E87.6 HYPOKALEMIA: ICD-10-CM

## 2024-06-24 DIAGNOSIS — Z79.01 LONG TERM (CURRENT) USE OF ANTICOAGULANTS: ICD-10-CM

## 2024-06-24 LAB
INR PPP: 2.4 (ref 0.9–1.1)
PROTHROMBIN TIME: 26.8 SECONDS (ref 9.8–12.8)

## 2024-06-24 PROCEDURE — 36415 COLL VENOUS BLD VENIPUNCTURE: CPT | Performed by: FAMILY MEDICINE

## 2024-06-24 PROCEDURE — 85610 PROTHROMBIN TIME: CPT | Mod: OUT | Performed by: FAMILY MEDICINE

## 2024-06-27 ENCOUNTER — LAB REQUISITION (OUTPATIENT)
Dept: LAB | Facility: HOSPITAL | Age: 54
End: 2024-06-27
Payer: COMMERCIAL

## 2024-06-27 DIAGNOSIS — Z79.01 LONG TERM (CURRENT) USE OF ANTICOAGULANTS: ICD-10-CM

## 2024-06-27 DIAGNOSIS — N18.6 END STAGE RENAL DISEASE (MULTI): ICD-10-CM

## 2024-06-27 LAB
INR PPP: 2.6 (ref 0.9–1.1)
PROTHROMBIN TIME: 29.1 SECONDS (ref 9.8–12.8)

## 2024-06-27 PROCEDURE — 85610 PROTHROMBIN TIME: CPT | Mod: OUT | Performed by: FAMILY MEDICINE

## 2024-06-27 PROCEDURE — 36415 COLL VENOUS BLD VENIPUNCTURE: CPT | Performed by: FAMILY MEDICINE

## 2024-07-01 ENCOUNTER — LAB REQUISITION (OUTPATIENT)
Dept: LAB | Facility: HOSPITAL | Age: 54
End: 2024-07-01
Payer: COMMERCIAL

## 2024-07-01 ENCOUNTER — HOSPITAL ENCOUNTER (OUTPATIENT)
Dept: DIALYSIS | Facility: HOSPITAL | Age: 54
Setting detail: DIALYSIS SERIES
Discharge: HOME | End: 2024-07-01
Payer: COMMERCIAL

## 2024-07-01 VITALS
BODY MASS INDEX: 40.9 KG/M2 | HEART RATE: 97 BPM | DIASTOLIC BLOOD PRESSURE: 79 MMHG | SYSTOLIC BLOOD PRESSURE: 119 MMHG | WEIGHT: 209.44 LBS

## 2024-07-01 DIAGNOSIS — N18.4 ANEMIA DUE TO STAGE 4 CHRONIC KIDNEY DISEASE (MULTI): Primary | ICD-10-CM

## 2024-07-01 DIAGNOSIS — N18.4 CHRONIC KIDNEY DISEASE, STAGE 4 (SEVERE) (MULTI): ICD-10-CM

## 2024-07-01 DIAGNOSIS — E78.5 HYPERLIPIDEMIA, UNSPECIFIED: ICD-10-CM

## 2024-07-01 DIAGNOSIS — Z79.01 LONG TERM (CURRENT) USE OF ANTICOAGULANTS: ICD-10-CM

## 2024-07-01 DIAGNOSIS — D63.1 ANEMIA DUE TO STAGE 4 CHRONIC KIDNEY DISEASE (MULTI): Primary | ICD-10-CM

## 2024-07-01 LAB
ALBUMIN SERPL BCP-MCNC: 3.9 G/DL (ref 3.4–5)
ANION GAP SERPL CALC-SCNC: 14 MMOL/L (ref 10–20)
BUN SERPL-MCNC: 37 MG/DL (ref 6–23)
CALCIUM SERPL-MCNC: 8.8 MG/DL (ref 8.6–10.6)
CHLORIDE SERPL-SCNC: 108 MMOL/L (ref 98–107)
CO2 SERPL-SCNC: 23 MMOL/L (ref 21–32)
CREAT SERPL-MCNC: 1.68 MG/DL (ref 0.5–1.05)
EGFRCR SERPLBLD CKD-EPI 2021: 36 ML/MIN/1.73M*2
ERYTHROCYTE [DISTWIDTH] IN BLOOD BY AUTOMATED COUNT: 14.8 % (ref 11.5–14.5)
GLUCOSE SERPL-MCNC: 134 MG/DL (ref 74–99)
HCT VFR BLD AUTO: 34.6 % (ref 36–46)
HGB BLD-MCNC: 10.4 G/DL (ref 12–16)
INR PPP: 2.1 (ref 0.9–1.1)
MCH RBC QN AUTO: 29.6 PG (ref 26–34)
MCHC RBC AUTO-ENTMCNC: 30.1 G/DL (ref 32–36)
MCV RBC AUTO: 99 FL (ref 80–100)
NRBC BLD-RTO: 0 /100 WBCS (ref 0–0)
PHOSPHATE SERPL-MCNC: 3.9 MG/DL (ref 2.5–4.9)
PLATELET # BLD AUTO: 186 X10*3/UL (ref 150–450)
POTASSIUM SERPL-SCNC: 3.9 MMOL/L (ref 3.5–5.3)
PROTHROMBIN TIME: 24.2 SECONDS (ref 9.8–12.8)
RBC # BLD AUTO: 3.51 X10*6/UL (ref 4–5.2)
SODIUM SERPL-SCNC: 141 MMOL/L (ref 136–145)
WBC # BLD AUTO: 7.8 X10*3/UL (ref 4.4–11.3)

## 2024-07-01 PROCEDURE — 36415 COLL VENOUS BLD VENIPUNCTURE: CPT | Performed by: FAMILY MEDICINE

## 2024-07-01 PROCEDURE — 96374 THER/PROPH/DIAG INJ IV PUSH: CPT

## 2024-07-01 PROCEDURE — 36415 COLL VENOUS BLD VENIPUNCTURE: CPT

## 2024-07-01 PROCEDURE — 2500000004 HC RX 250 GENERAL PHARMACY W/ HCPCS (ALT 636 FOR OP/ED): Mod: JZ,JG,SE

## 2024-07-01 PROCEDURE — 85027 COMPLETE CBC AUTOMATED: CPT | Performed by: NURSE PRACTITIONER

## 2024-07-01 PROCEDURE — 85610 PROTHROMBIN TIME: CPT | Mod: OUT | Performed by: FAMILY MEDICINE

## 2024-07-01 PROCEDURE — 36415 COLL VENOUS BLD VENIPUNCTURE: CPT | Performed by: NURSE PRACTITIONER

## 2024-07-01 PROCEDURE — 80069 RENAL FUNCTION PANEL: CPT | Performed by: NURSE PRACTITIONER

## 2024-07-01 PROCEDURE — 96372 THER/PROPH/DIAG INJ SC/IM: CPT

## 2024-07-01 RX ORDER — FAMOTIDINE 10 MG/ML
20 INJECTION INTRAVENOUS ONCE AS NEEDED
OUTPATIENT
Start: 2024-07-04

## 2024-07-01 RX ORDER — ALBUTEROL SULFATE 0.83 MG/ML
3 SOLUTION RESPIRATORY (INHALATION) AS NEEDED
Status: DISCONTINUED | OUTPATIENT
Start: 2024-07-01 | End: 2024-07-02 | Stop reason: HOSPADM

## 2024-07-01 RX ORDER — FAMOTIDINE 10 MG/ML
20 INJECTION INTRAVENOUS ONCE AS NEEDED
Status: DISCONTINUED | OUTPATIENT
Start: 2024-07-01 | End: 2024-07-02 | Stop reason: HOSPADM

## 2024-07-01 RX ORDER — EPINEPHRINE 0.3 MG/.3ML
0.3 INJECTION SUBCUTANEOUS EVERY 5 MIN PRN
Status: DISCONTINUED | OUTPATIENT
Start: 2024-07-01 | End: 2024-07-02 | Stop reason: HOSPADM

## 2024-07-01 RX ORDER — EPINEPHRINE 0.3 MG/.3ML
0.3 INJECTION SUBCUTANEOUS EVERY 5 MIN PRN
OUTPATIENT
Start: 2024-07-04

## 2024-07-01 RX ORDER — ALBUTEROL SULFATE 0.83 MG/ML
3 SOLUTION RESPIRATORY (INHALATION) AS NEEDED
OUTPATIENT
Start: 2024-07-04

## 2024-07-01 NOTE — PROGRESS NOTES
"..How is your energy? Fair,\"A lot of tired days\"  How is your appetite? pretty good   Any nausea and vomiting after eating?NO   Any diarrhea or constipation? NO   Any SOB? No   Any Edema in lower extremities? Yes RLE  Any questions regarding your Kidney disease? No   DO you have a Dialysis accesses?        ..  Vitals:    07/01/24 1331 07/01/24 1332   BP: 127/73 119/79   BP Location: Left arm Left arm   Patient Position: Sitting Standing   BP Cuff Size: Adult Adult   Pulse: 96 97   Weight: 95 kg (209 lb 7 oz)         "

## 2024-07-08 ENCOUNTER — LAB REQUISITION (OUTPATIENT)
Dept: LAB | Facility: HOSPITAL | Age: 54
End: 2024-07-08
Payer: COMMERCIAL

## 2024-07-08 DIAGNOSIS — Z79.01 LONG TERM (CURRENT) USE OF ANTICOAGULANTS: ICD-10-CM

## 2024-07-08 LAB
INR PPP: 3 (ref 0.9–1.1)
PROTHROMBIN TIME: 33.9 SECONDS (ref 9.8–12.8)

## 2024-07-08 PROCEDURE — 85610 PROTHROMBIN TIME: CPT | Mod: OUT | Performed by: FAMILY MEDICINE

## 2024-07-08 PROCEDURE — 36415 COLL VENOUS BLD VENIPUNCTURE: CPT | Performed by: FAMILY MEDICINE

## 2024-07-11 ENCOUNTER — LAB REQUISITION (OUTPATIENT)
Dept: LAB | Facility: HOSPITAL | Age: 54
End: 2024-07-11
Payer: COMMERCIAL

## 2024-07-11 DIAGNOSIS — E87.6 HYPOKALEMIA: ICD-10-CM

## 2024-07-11 DIAGNOSIS — Z79.01 LONG TERM (CURRENT) USE OF ANTICOAGULANTS: ICD-10-CM

## 2024-07-11 DIAGNOSIS — N18.9 CHRONIC KIDNEY DISEASE, UNSPECIFIED: ICD-10-CM

## 2024-07-11 LAB
INR PPP: 1.3 (ref 0.9–1.1)
PROTHROMBIN TIME: 14.6 SECONDS (ref 9.8–12.8)

## 2024-07-11 PROCEDURE — 85610 PROTHROMBIN TIME: CPT | Mod: OUT | Performed by: FAMILY MEDICINE

## 2024-07-11 PROCEDURE — 36415 COLL VENOUS BLD VENIPUNCTURE: CPT | Performed by: FAMILY MEDICINE

## 2024-07-15 ENCOUNTER — LAB REQUISITION (OUTPATIENT)
Dept: LAB | Facility: HOSPITAL | Age: 54
End: 2024-07-15
Payer: COMMERCIAL

## 2024-07-15 DIAGNOSIS — Z79.01 LONG TERM (CURRENT) USE OF ANTICOAGULANTS: ICD-10-CM

## 2024-07-15 LAB
INR PPP: 1.6 (ref 0.9–1.1)
PROTHROMBIN TIME: 18.5 SECONDS (ref 9.8–12.8)

## 2024-07-15 PROCEDURE — 36415 COLL VENOUS BLD VENIPUNCTURE: CPT | Performed by: FAMILY MEDICINE

## 2024-07-15 PROCEDURE — 85610 PROTHROMBIN TIME: CPT | Mod: OUT | Performed by: FAMILY MEDICINE

## 2024-07-18 ENCOUNTER — LAB REQUISITION (OUTPATIENT)
Dept: LAB | Facility: HOSPITAL | Age: 54
End: 2024-07-18
Payer: COMMERCIAL

## 2024-07-18 DIAGNOSIS — N18.9 CHRONIC KIDNEY DISEASE, UNSPECIFIED: ICD-10-CM

## 2024-07-18 DIAGNOSIS — Z79.01 LONG TERM (CURRENT) USE OF ANTICOAGULANTS: ICD-10-CM

## 2024-07-18 LAB
INR PPP: 2 (ref 0.9–1.1)
PROTHROMBIN TIME: 22.6 SECONDS (ref 9.8–12.8)

## 2024-07-18 PROCEDURE — 36415 COLL VENOUS BLD VENIPUNCTURE: CPT | Performed by: FAMILY MEDICINE

## 2024-07-18 PROCEDURE — 85610 PROTHROMBIN TIME: CPT | Mod: OUT | Performed by: FAMILY MEDICINE

## 2024-07-22 ENCOUNTER — LAB REQUISITION (OUTPATIENT)
Dept: LAB | Facility: HOSPITAL | Age: 54
End: 2024-07-22
Payer: MEDICARE

## 2024-07-22 DIAGNOSIS — Z79.01 LONG TERM (CURRENT) USE OF ANTICOAGULANTS: ICD-10-CM

## 2024-07-22 LAB
INR PPP: 1.6 (ref 0.9–1.1)
PROTHROMBIN TIME: 18.1 SECONDS (ref 9.8–12.8)

## 2024-07-22 PROCEDURE — 36415 COLL VENOUS BLD VENIPUNCTURE: CPT | Performed by: FAMILY MEDICINE

## 2024-07-22 PROCEDURE — 85610 PROTHROMBIN TIME: CPT | Mod: OUT | Performed by: FAMILY MEDICINE

## 2024-07-25 ENCOUNTER — LAB REQUISITION (OUTPATIENT)
Dept: LAB | Facility: HOSPITAL | Age: 54
End: 2024-07-25
Payer: MEDICARE

## 2024-07-25 DIAGNOSIS — I48.91 UNSPECIFIED ATRIAL FIBRILLATION (MULTI): ICD-10-CM

## 2024-07-25 LAB
INR PPP: 1.8 (ref 0.9–1.1)
PROTHROMBIN TIME: 19.9 SECONDS (ref 9.8–12.8)

## 2024-07-25 PROCEDURE — 36415 COLL VENOUS BLD VENIPUNCTURE: CPT | Performed by: FAMILY MEDICINE

## 2024-07-25 PROCEDURE — 85610 PROTHROMBIN TIME: CPT | Mod: OUT | Performed by: FAMILY MEDICINE

## 2024-07-26 ENCOUNTER — NURSING HOME VISIT (OUTPATIENT)
Dept: POST ACUTE CARE | Facility: EXTERNAL LOCATION | Age: 54
End: 2024-07-26
Payer: MEDICARE

## 2024-07-26 DIAGNOSIS — E11.22 TYPE 2 DIABETES MELLITUS WITH DIABETIC CHRONIC KIDNEY DISEASE, UNSPECIFIED CKD STAGE, UNSPECIFIED WHETHER LONG TERM INSULIN USE (MULTI): ICD-10-CM

## 2024-07-26 DIAGNOSIS — D68.61 ANTI-PHOSPHOLIPID SYNDROME (MULTI): ICD-10-CM

## 2024-07-26 DIAGNOSIS — N18.5 CKD (CHRONIC KIDNEY DISEASE), STAGE V (MULTI): ICD-10-CM

## 2024-07-26 DIAGNOSIS — J45.40 MODERATE PERSISTENT ASTHMA, UNSPECIFIED WHETHER COMPLICATED (HHS-HCC): ICD-10-CM

## 2024-07-26 DIAGNOSIS — R54 AGE-RELATED PHYSICAL DEBILITY: ICD-10-CM

## 2024-07-26 DIAGNOSIS — M21.372 BILATERAL FOOT-DROP: Primary | ICD-10-CM

## 2024-07-26 DIAGNOSIS — M21.371 BILATERAL FOOT-DROP: Primary | ICD-10-CM

## 2024-07-26 PROCEDURE — 99308 SBSQ NF CARE LOW MDM 20: CPT | Performed by: FAMILY MEDICINE

## 2024-07-26 NOTE — LETTER
Patient: Janene Pinedo  : 1970    Encounter Date: 2024    Subjective  Patient ID: Janene Pinedo is a 54 y.o. female who is long term resident being seen and evaluated for multiple medical problems.    Antiphospholipid syndrome, asthma, anemia, right foot swelling, ckd, dm2, debility         Review of Systems   Reason unable to perform ROS: debility.       Objective  There were no vitals taken for this visit.    Physical Exam  Vitals and nursing note reviewed.   Constitutional:       Appearance: She is obese.   HENT:      Head: Normocephalic and atraumatic.      Nose: Nose normal.      Mouth/Throat:      Mouth: Mucous membranes are moist.      Pharynx: Oropharynx is clear.   Eyes:      Extraocular Movements: Extraocular movements intact.      Conjunctiva/sclera: Conjunctivae normal.      Pupils: Pupils are equal, round, and reactive to light.   Cardiovascular:      Rate and Rhythm: Normal rate and regular rhythm.      Pulses: Normal pulses.   Pulmonary:      Effort: Pulmonary effort is normal.      Breath sounds: Wheezing present.   Abdominal:      General: Bowel sounds are normal.      Palpations: Abdomen is soft.   Musculoskeletal:         General: Tenderness present. Normal range of motion.      Cervical back: Normal range of motion and neck supple.      Right lower leg: Edema present.   Skin:     General: Skin is warm and dry.      Capillary Refill: Capillary refill takes less than 2 seconds.      Coloration: Skin is pale.   Neurological:      Mental Status: She is alert. Mental status is at baseline.      Motor: Weakness present.      Gait: Gait abnormal.   Psychiatric:         Behavior: Behavior normal.         Thought Content: Thought content normal.         Judgment: Judgment normal.      Comments: flat         Assessment/Plan  Problem List Items Addressed This Visit             ICD-10-CM    Anti-phospholipid syndrome (Multi) D68.61    Asthma ([facility]) J45.909    Bilateral foot-drop - Primary  M21.371, M21.372    CKD (chronic kidney disease), stage V (Multi) N18.5    Type 2 diabetes mellitus with diabetic chronic kidney disease, unspecified CKD stage, unspecified whether long term insulin use (Multi) E11.22     Other Visit Diagnoses         Codes    Age-related physical debility     R54             Goals    None           Electronically Signed By: Kyra Rivera MD   7/26/24  9:08 AM

## 2024-07-26 NOTE — PROGRESS NOTES
Subjective   Patient ID: Janene Pinedo is a 54 y.o. female who is long term resident being seen and evaluated for multiple medical problems.    Antiphospholipid syndrome, asthma, anemia, right foot swelling, ckd, dm2, debility         Review of Systems   Reason unable to perform ROS: debility.       Objective   There were no vitals taken for this visit.    Physical Exam  Vitals and nursing note reviewed.   Constitutional:       Appearance: She is obese.   HENT:      Head: Normocephalic and atraumatic.      Nose: Nose normal.      Mouth/Throat:      Mouth: Mucous membranes are moist.      Pharynx: Oropharynx is clear.   Eyes:      Extraocular Movements: Extraocular movements intact.      Conjunctiva/sclera: Conjunctivae normal.      Pupils: Pupils are equal, round, and reactive to light.   Cardiovascular:      Rate and Rhythm: Normal rate and regular rhythm.      Pulses: Normal pulses.   Pulmonary:      Effort: Pulmonary effort is normal.      Breath sounds: Wheezing present.   Abdominal:      General: Bowel sounds are normal.      Palpations: Abdomen is soft.   Musculoskeletal:         General: Tenderness present. Normal range of motion.      Cervical back: Normal range of motion and neck supple.      Right lower leg: Edema present.   Skin:     General: Skin is warm and dry.      Capillary Refill: Capillary refill takes less than 2 seconds.      Coloration: Skin is pale.   Neurological:      Mental Status: She is alert. Mental status is at baseline.      Motor: Weakness present.      Gait: Gait abnormal.   Psychiatric:         Behavior: Behavior normal.         Thought Content: Thought content normal.         Judgment: Judgment normal.      Comments: flat         Assessment/Plan   Problem List Items Addressed This Visit             ICD-10-CM    Anti-phospholipid syndrome (Multi) D68.61    Asthma ([facility]) J45.909    Bilateral foot-drop - Primary M21.371, M21.372    CKD (chronic kidney disease), stage V (Multi)  N18.5    Type 2 diabetes mellitus with diabetic chronic kidney disease, unspecified CKD stage, unspecified whether long term insulin use (Multi) E11.22     Other Visit Diagnoses         Codes    Age-related physical debility     R54             Goals    None

## 2024-07-29 ENCOUNTER — LAB REQUISITION (OUTPATIENT)
Dept: LAB | Facility: HOSPITAL | Age: 54
End: 2024-07-29
Payer: MEDICARE

## 2024-07-29 ENCOUNTER — APPOINTMENT (OUTPATIENT)
Dept: DIALYSIS | Facility: HOSPITAL | Age: 54
End: 2024-07-29
Payer: MEDICARE

## 2024-07-29 DIAGNOSIS — Z79.01 LONG TERM (CURRENT) USE OF ANTICOAGULANTS: ICD-10-CM

## 2024-07-29 LAB
INR PPP: 1.8 (ref 0.9–1.1)
PROTHROMBIN TIME: 20.2 SECONDS (ref 9.8–12.8)

## 2024-07-29 PROCEDURE — 36415 COLL VENOUS BLD VENIPUNCTURE: CPT | Performed by: FAMILY MEDICINE

## 2024-07-29 PROCEDURE — 85610 PROTHROMBIN TIME: CPT | Mod: OUT | Performed by: FAMILY MEDICINE

## 2024-07-30 ENCOUNTER — APPOINTMENT (OUTPATIENT)
Dept: DIALYSIS | Facility: HOSPITAL | Age: 54
End: 2024-07-30
Payer: MEDICARE

## 2024-07-31 ENCOUNTER — LAB REQUISITION (OUTPATIENT)
Dept: LAB | Facility: HOSPITAL | Age: 54
End: 2024-07-31
Payer: MEDICARE

## 2024-07-31 DIAGNOSIS — R60.9 EDEMA, UNSPECIFIED: ICD-10-CM

## 2024-07-31 DIAGNOSIS — N18.9 CHRONIC KIDNEY DISEASE, UNSPECIFIED: ICD-10-CM

## 2024-07-31 LAB
ANION GAP SERPL CALC-SCNC: 12 MMOL/L (ref 10–20)
BUN SERPL-MCNC: 32 MG/DL (ref 6–23)
CALCIUM SERPL-MCNC: 8.5 MG/DL (ref 8.6–10.3)
CHLORIDE SERPL-SCNC: 108 MMOL/L (ref 98–107)
CO2 SERPL-SCNC: 26 MMOL/L (ref 21–32)
CREAT SERPL-MCNC: 1.69 MG/DL (ref 0.5–1.05)
EGFRCR SERPLBLD CKD-EPI 2021: 36 ML/MIN/1.73M*2
ERYTHROCYTE [DISTWIDTH] IN BLOOD BY AUTOMATED COUNT: 14.6 % (ref 11.5–14.5)
GLUCOSE SERPL-MCNC: 208 MG/DL (ref 74–99)
HCT VFR BLD AUTO: 30.5 % (ref 36–46)
HGB BLD-MCNC: 9.8 G/DL (ref 12–16)
MCH RBC QN AUTO: 30 PG (ref 26–34)
MCHC RBC AUTO-ENTMCNC: 32.1 G/DL (ref 32–36)
MCV RBC AUTO: 93 FL (ref 80–100)
NRBC BLD-RTO: 0 /100 WBCS (ref 0–0)
PLATELET # BLD AUTO: 149 X10*3/UL (ref 150–450)
POTASSIUM SERPL-SCNC: 3.8 MMOL/L (ref 3.5–5.3)
RBC # BLD AUTO: 3.27 X10*6/UL (ref 4–5.2)
SODIUM SERPL-SCNC: 142 MMOL/L (ref 136–145)
WBC # BLD AUTO: 5.8 X10*3/UL (ref 4.4–11.3)

## 2024-07-31 PROCEDURE — 85027 COMPLETE CBC AUTOMATED: CPT | Mod: OUT | Performed by: FAMILY MEDICINE

## 2024-07-31 PROCEDURE — 80048 BASIC METABOLIC PNL TOTAL CA: CPT | Mod: OUT | Performed by: FAMILY MEDICINE

## 2024-07-31 PROCEDURE — 36415 COLL VENOUS BLD VENIPUNCTURE: CPT | Performed by: FAMILY MEDICINE

## 2024-08-01 ENCOUNTER — LAB REQUISITION (OUTPATIENT)
Dept: LAB | Facility: HOSPITAL | Age: 54
End: 2024-08-01
Payer: MEDICARE

## 2024-08-01 ENCOUNTER — HOSPITAL ENCOUNTER (OUTPATIENT)
Dept: DIALYSIS | Facility: HOSPITAL | Age: 54
Setting detail: DIALYSIS SERIES
Discharge: HOME | End: 2024-08-01
Payer: MEDICARE

## 2024-08-01 VITALS
DIASTOLIC BLOOD PRESSURE: 79 MMHG | BODY MASS INDEX: 40.99 KG/M2 | RESPIRATION RATE: 18 BRPM | WEIGHT: 209.88 LBS | SYSTOLIC BLOOD PRESSURE: 127 MMHG | HEART RATE: 100 BPM

## 2024-08-01 DIAGNOSIS — N18.4 CHRONIC KIDNEY DISEASE, STAGE 4 (SEVERE) (MULTI): ICD-10-CM

## 2024-08-01 DIAGNOSIS — Z79.01 LONG TERM (CURRENT) USE OF ANTICOAGULANTS: ICD-10-CM

## 2024-08-01 DIAGNOSIS — D63.1 ANEMIA DUE TO STAGE 4 CHRONIC KIDNEY DISEASE (MULTI): Primary | ICD-10-CM

## 2024-08-01 DIAGNOSIS — N18.4 ANEMIA DUE TO STAGE 4 CHRONIC KIDNEY DISEASE (MULTI): Primary | ICD-10-CM

## 2024-08-01 DIAGNOSIS — N18.9 CHRONIC KIDNEY DISEASE, UNSPECIFIED: ICD-10-CM

## 2024-08-01 LAB
INR PPP: 2.3 (ref 0.9–1.1)
PROTHROMBIN TIME: 25.6 SECONDS (ref 9.8–12.8)

## 2024-08-01 PROCEDURE — 85610 PROTHROMBIN TIME: CPT | Mod: OUT | Performed by: FAMILY MEDICINE

## 2024-08-01 PROCEDURE — 2500000004 HC RX 250 GENERAL PHARMACY W/ HCPCS (ALT 636 FOR OP/ED): Mod: JZ,JG

## 2024-08-01 PROCEDURE — 36415 COLL VENOUS BLD VENIPUNCTURE: CPT | Performed by: FAMILY MEDICINE

## 2024-08-01 PROCEDURE — 96372 THER/PROPH/DIAG INJ SC/IM: CPT

## 2024-08-01 RX ORDER — ALBUTEROL SULFATE 0.83 MG/ML
3 SOLUTION RESPIRATORY (INHALATION) AS NEEDED
OUTPATIENT
Start: 2024-08-26

## 2024-08-01 RX ORDER — FAMOTIDINE 10 MG/ML
20 INJECTION INTRAVENOUS ONCE AS NEEDED
OUTPATIENT
Start: 2024-08-26

## 2024-08-01 RX ORDER — EPINEPHRINE 0.3 MG/.3ML
0.3 INJECTION SUBCUTANEOUS EVERY 5 MIN PRN
OUTPATIENT
Start: 2024-08-26

## 2024-08-01 NOTE — PROGRESS NOTES
How is your energy? FAIR  How is your appetite? IN BETWEEN  Any nausea and vomiting after eating? NO  Any diarrhea or constipation? NO  Any SOB? NO  Any Edema in lower extremities?  R LE +1   Any questions regarding your Kidney disease? NO  DO you have a Dialysis accesses?    NO     Received Aranesp 150mcg SQ

## 2024-08-05 ENCOUNTER — LAB REQUISITION (OUTPATIENT)
Dept: LAB | Facility: HOSPITAL | Age: 54
End: 2024-08-05
Payer: MEDICARE

## 2024-08-05 ENCOUNTER — LAB REQUISITION (OUTPATIENT)
Dept: LAB | Facility: HOSPITAL | Age: 54
End: 2024-08-05

## 2024-08-05 DIAGNOSIS — Z79.01 LONG TERM (CURRENT) USE OF ANTICOAGULANTS: ICD-10-CM

## 2024-08-05 DIAGNOSIS — R11.2 NAUSEA WITH VOMITING, UNSPECIFIED: ICD-10-CM

## 2024-08-05 DIAGNOSIS — F41.9 ANXIETY DISORDER, UNSPECIFIED: ICD-10-CM

## 2024-08-05 DIAGNOSIS — D64.9 ANEMIA, UNSPECIFIED: ICD-10-CM

## 2024-08-05 DIAGNOSIS — N18.9 CHRONIC KIDNEY DISEASE, UNSPECIFIED: ICD-10-CM

## 2024-08-05 DIAGNOSIS — E03.9 HYPOTHYROIDISM, UNSPECIFIED: ICD-10-CM

## 2024-08-05 DIAGNOSIS — K21.9 GASTRO-ESOPHAGEAL REFLUX DISEASE WITHOUT ESOPHAGITIS: ICD-10-CM

## 2024-08-05 DIAGNOSIS — E55.9 VITAMIN D DEFICIENCY, UNSPECIFIED: ICD-10-CM

## 2024-08-05 DIAGNOSIS — E78.5 HYPERLIPIDEMIA, UNSPECIFIED: ICD-10-CM

## 2024-08-05 LAB
INR PPP: 2.9 (ref 0.9–1.1)
PROTHROMBIN TIME: 32.8 SECONDS (ref 9.8–12.8)

## 2024-08-05 PROCEDURE — 36415 COLL VENOUS BLD VENIPUNCTURE: CPT | Performed by: FAMILY MEDICINE

## 2024-08-05 PROCEDURE — 85610 PROTHROMBIN TIME: CPT | Mod: OUT | Performed by: FAMILY MEDICINE

## 2024-08-07 ENCOUNTER — NURSING HOME VISIT (OUTPATIENT)
Dept: POST ACUTE CARE | Facility: EXTERNAL LOCATION | Age: 54
End: 2024-08-07
Payer: MEDICARE

## 2024-08-07 DIAGNOSIS — N18.5 CKD (CHRONIC KIDNEY DISEASE), STAGE V (MULTI): ICD-10-CM

## 2024-08-07 DIAGNOSIS — N18.4 ANEMIA DUE TO STAGE 4 CHRONIC KIDNEY DISEASE (MULTI): ICD-10-CM

## 2024-08-07 DIAGNOSIS — E11.22 TYPE 2 DIABETES MELLITUS WITH DIABETIC CHRONIC KIDNEY DISEASE, UNSPECIFIED CKD STAGE, UNSPECIFIED WHETHER LONG TERM INSULIN USE (MULTI): ICD-10-CM

## 2024-08-07 DIAGNOSIS — D63.1 ANEMIA DUE TO STAGE 4 CHRONIC KIDNEY DISEASE (MULTI): ICD-10-CM

## 2024-08-07 DIAGNOSIS — R54 AGE-RELATED PHYSICAL DEBILITY: ICD-10-CM

## 2024-08-07 DIAGNOSIS — M21.371 BILATERAL FOOT-DROP: Primary | ICD-10-CM

## 2024-08-07 DIAGNOSIS — D68.61 ANTI-PHOSPHOLIPID SYNDROME (MULTI): ICD-10-CM

## 2024-08-07 DIAGNOSIS — J45.40 MODERATE PERSISTENT ASTHMA, UNSPECIFIED WHETHER COMPLICATED (HHS-HCC): ICD-10-CM

## 2024-08-07 DIAGNOSIS — M21.372 BILATERAL FOOT-DROP: Primary | ICD-10-CM

## 2024-08-07 PROCEDURE — 99308 SBSQ NF CARE LOW MDM 20: CPT | Performed by: FAMILY MEDICINE

## 2024-08-07 NOTE — LETTER
Patient: Janene Pinedo  : 1970    Encounter Date: 2024    Subjective  Patient ID: Janene Pinedo is a 54 y.o. female who is long term resident being seen and evaluated for multiple medical problems.    Antiphospholipid syndrome, debility, dm2, bilateral foot drop, ckd, DM2, asthma, anemia         Review of Systems   Reason unable to perform ROS: debility.       Objective  There were no vitals taken for this visit.    Physical Exam  Vitals and nursing note reviewed.   Constitutional:       Appearance: She is ill-appearing.   HENT:      Head: Normocephalic and atraumatic.      Nose: Nose normal.      Mouth/Throat:      Mouth: Mucous membranes are moist.      Pharynx: Oropharynx is clear.   Eyes:      Extraocular Movements: Extraocular movements intact.      Conjunctiva/sclera: Conjunctivae normal.      Pupils: Pupils are equal, round, and reactive to light.   Cardiovascular:      Rate and Rhythm: Normal rate and regular rhythm.      Pulses: Normal pulses.   Pulmonary:      Effort: Pulmonary effort is normal.      Breath sounds: Normal breath sounds.   Abdominal:      General: Bowel sounds are normal.      Palpations: Abdomen is soft.   Musculoskeletal:         General: Tenderness present. Normal range of motion.      Cervical back: Normal range of motion and neck supple.   Skin:     General: Skin is warm and dry.      Capillary Refill: Capillary refill takes less than 2 seconds.      Coloration: Skin is pale.   Neurological:      Mental Status: Mental status is at baseline.      Motor: Weakness present.      Gait: Gait abnormal.   Psychiatric:         Mood and Affect: Affect is flat.         Behavior: Behavior normal.         Thought Content: Thought content normal.         Judgment: Judgment normal.         Assessment/Plan  Problem List Items Addressed This Visit             ICD-10-CM    Anemia D64.9    Anti-phospholipid syndrome (Multi) D68.61    Asthma ([facility]) J45.909    Bilateral foot-drop -  Primary M21.371, M21.372    CKD (chronic kidney disease), stage V (Multi) N18.5    Type 2 diabetes mellitus with diabetic chronic kidney disease, unspecified CKD stage, unspecified whether long term insulin use (Multi) E11.22     Other Visit Diagnoses         Codes    Age-related physical debility     R54             Goals    None           Electronically Signed By: Kyra Rivera MD   8/12/24 11:11 AM

## 2024-08-09 ENCOUNTER — LAB REQUISITION (OUTPATIENT)
Dept: LAB | Facility: HOSPITAL | Age: 54
End: 2024-08-09
Payer: MEDICARE

## 2024-08-09 DIAGNOSIS — K21.9 GASTRO-ESOPHAGEAL REFLUX DISEASE WITHOUT ESOPHAGITIS: ICD-10-CM

## 2024-08-09 DIAGNOSIS — R11.2 NAUSEA WITH VOMITING, UNSPECIFIED: ICD-10-CM

## 2024-08-09 DIAGNOSIS — N18.9 CHRONIC KIDNEY DISEASE, UNSPECIFIED: ICD-10-CM

## 2024-08-09 DIAGNOSIS — F41.9 ANXIETY DISORDER, UNSPECIFIED: ICD-10-CM

## 2024-08-09 DIAGNOSIS — E55.9 VITAMIN D DEFICIENCY, UNSPECIFIED: ICD-10-CM

## 2024-08-09 DIAGNOSIS — Z79.01 LONG TERM (CURRENT) USE OF ANTICOAGULANTS: ICD-10-CM

## 2024-08-09 DIAGNOSIS — E03.9 HYPOTHYROIDISM, UNSPECIFIED: ICD-10-CM

## 2024-08-09 DIAGNOSIS — D64.9 ANEMIA, UNSPECIFIED: ICD-10-CM

## 2024-08-09 DIAGNOSIS — E78.5 HYPERLIPIDEMIA, UNSPECIFIED: ICD-10-CM

## 2024-08-09 LAB
INR PPP: 2.5 (ref 0.9–1.1)
PROTHROMBIN TIME: 28.9 SECONDS (ref 9.8–12.8)

## 2024-08-09 PROCEDURE — 36415 COLL VENOUS BLD VENIPUNCTURE: CPT | Performed by: FAMILY MEDICINE

## 2024-08-09 PROCEDURE — 85610 PROTHROMBIN TIME: CPT | Mod: OUT | Performed by: FAMILY MEDICINE

## 2024-08-12 ENCOUNTER — LAB REQUISITION (OUTPATIENT)
Dept: LAB | Facility: HOSPITAL | Age: 54
End: 2024-08-12
Payer: MEDICARE

## 2024-08-12 DIAGNOSIS — Z79.01 LONG TERM (CURRENT) USE OF ANTICOAGULANTS: ICD-10-CM

## 2024-08-12 LAB
INR PPP: 3 (ref 0.9–1.1)
PROTHROMBIN TIME: 34.3 SECONDS (ref 9.8–12.8)

## 2024-08-12 PROCEDURE — 36415 COLL VENOUS BLD VENIPUNCTURE: CPT | Performed by: FAMILY MEDICINE

## 2024-08-12 PROCEDURE — 85610 PROTHROMBIN TIME: CPT | Mod: OUT | Performed by: FAMILY MEDICINE

## 2024-08-12 NOTE — PROGRESS NOTES
Subjective   Patient ID: Janene Pinedo is a 54 y.o. female who is long term resident being seen and evaluated for multiple medical problems.    Antiphospholipid syndrome, debility, dm2, bilateral foot drop, ckd, DM2, asthma, anemia         Review of Systems   Reason unable to perform ROS: debility.       Objective   There were no vitals taken for this visit.    Physical Exam  Vitals and nursing note reviewed.   Constitutional:       Appearance: She is ill-appearing.   HENT:      Head: Normocephalic and atraumatic.      Nose: Nose normal.      Mouth/Throat:      Mouth: Mucous membranes are moist.      Pharynx: Oropharynx is clear.   Eyes:      Extraocular Movements: Extraocular movements intact.      Conjunctiva/sclera: Conjunctivae normal.      Pupils: Pupils are equal, round, and reactive to light.   Cardiovascular:      Rate and Rhythm: Normal rate and regular rhythm.      Pulses: Normal pulses.   Pulmonary:      Effort: Pulmonary effort is normal.      Breath sounds: Normal breath sounds.   Abdominal:      General: Bowel sounds are normal.      Palpations: Abdomen is soft.   Musculoskeletal:         General: Tenderness present. Normal range of motion.      Cervical back: Normal range of motion and neck supple.   Skin:     General: Skin is warm and dry.      Capillary Refill: Capillary refill takes less than 2 seconds.      Coloration: Skin is pale.   Neurological:      Mental Status: Mental status is at baseline.      Motor: Weakness present.      Gait: Gait abnormal.   Psychiatric:         Mood and Affect: Affect is flat.         Behavior: Behavior normal.         Thought Content: Thought content normal.         Judgment: Judgment normal.         Assessment/Plan   Problem List Items Addressed This Visit             ICD-10-CM    Anemia D64.9    Anti-phospholipid syndrome (Multi) D68.61    Asthma ([facility]) J45.909    Bilateral foot-drop - Primary M21.371, M21.372    CKD (chronic kidney disease), stage V (Multi)  N18.5    Type 2 diabetes mellitus with diabetic chronic kidney disease, unspecified CKD stage, unspecified whether long term insulin use (Multi) E11.22     Other Visit Diagnoses         Codes    Age-related physical debility     R54             Goals    None

## 2024-08-15 ENCOUNTER — LAB REQUISITION (OUTPATIENT)
Dept: LAB | Facility: HOSPITAL | Age: 54
End: 2024-08-15
Payer: MEDICARE

## 2024-08-15 DIAGNOSIS — N18.9 CHRONIC KIDNEY DISEASE, UNSPECIFIED: ICD-10-CM

## 2024-08-15 LAB
INR PPP: 2 (ref 0.9–1.1)
PROTHROMBIN TIME: 22.3 SECONDS (ref 9.8–12.8)

## 2024-08-15 PROCEDURE — 85610 PROTHROMBIN TIME: CPT | Mod: OUT | Performed by: FAMILY MEDICINE

## 2024-08-15 PROCEDURE — 36415 COLL VENOUS BLD VENIPUNCTURE: CPT | Performed by: FAMILY MEDICINE

## 2024-08-19 ENCOUNTER — LAB REQUISITION (OUTPATIENT)
Dept: LAB | Facility: HOSPITAL | Age: 54
End: 2024-08-19
Payer: MEDICARE

## 2024-08-19 DIAGNOSIS — N18.9 CHRONIC KIDNEY DISEASE, UNSPECIFIED: ICD-10-CM

## 2024-08-19 DIAGNOSIS — E55.9 VITAMIN D DEFICIENCY, UNSPECIFIED: ICD-10-CM

## 2024-08-19 DIAGNOSIS — Z79.01 LONG TERM (CURRENT) USE OF ANTICOAGULANTS: ICD-10-CM

## 2024-08-19 DIAGNOSIS — E03.9 HYPOTHYROIDISM, UNSPECIFIED: ICD-10-CM

## 2024-08-19 DIAGNOSIS — R11.2 NAUSEA WITH VOMITING, UNSPECIFIED: ICD-10-CM

## 2024-08-19 DIAGNOSIS — D64.9 ANEMIA, UNSPECIFIED: ICD-10-CM

## 2024-08-19 DIAGNOSIS — K21.9 GASTRO-ESOPHAGEAL REFLUX DISEASE WITHOUT ESOPHAGITIS: ICD-10-CM

## 2024-08-19 DIAGNOSIS — F41.9 ANXIETY DISORDER, UNSPECIFIED: ICD-10-CM

## 2024-08-19 DIAGNOSIS — E78.5 HYPERLIPIDEMIA, UNSPECIFIED: ICD-10-CM

## 2024-08-19 LAB
INR PPP: 2.6 (ref 0.9–1.1)
PROTHROMBIN TIME: 29.5 SECONDS (ref 9.8–12.8)

## 2024-08-19 PROCEDURE — 85610 PROTHROMBIN TIME: CPT | Mod: OUT | Performed by: FAMILY MEDICINE

## 2024-08-19 PROCEDURE — 36415 COLL VENOUS BLD VENIPUNCTURE: CPT | Performed by: FAMILY MEDICINE

## 2024-08-22 ENCOUNTER — LAB REQUISITION (OUTPATIENT)
Dept: LAB | Facility: HOSPITAL | Age: 54
End: 2024-08-22
Payer: MEDICARE

## 2024-08-22 DIAGNOSIS — I48.91 UNSPECIFIED ATRIAL FIBRILLATION (MULTI): ICD-10-CM

## 2024-08-22 LAB
INR PPP: 3 (ref 0.9–1.1)
PROTHROMBIN TIME: 33.7 SECONDS (ref 9.8–12.8)

## 2024-08-22 PROCEDURE — 36415 COLL VENOUS BLD VENIPUNCTURE: CPT | Performed by: FAMILY MEDICINE

## 2024-08-22 PROCEDURE — 85610 PROTHROMBIN TIME: CPT | Mod: OUT | Performed by: FAMILY MEDICINE

## 2024-08-26 ENCOUNTER — LAB REQUISITION (OUTPATIENT)
Dept: LAB | Facility: HOSPITAL | Age: 54
End: 2024-08-26
Payer: MEDICARE

## 2024-08-26 ENCOUNTER — LAB (OUTPATIENT)
Dept: LAB | Facility: LAB | Age: 54
End: 2024-08-26
Payer: MEDICARE

## 2024-08-26 DIAGNOSIS — N18.4 STAGE 4 CHRONIC KIDNEY DISEASE (MULTI): ICD-10-CM

## 2024-08-26 DIAGNOSIS — R60.0 LEG EDEMA: ICD-10-CM

## 2024-08-26 DIAGNOSIS — Z79.01 LONG TERM (CURRENT) USE OF ANTICOAGULANTS: ICD-10-CM

## 2024-08-26 LAB
25(OH)D3 SERPL-MCNC: 35 NG/ML (ref 30–100)
ALBUMIN SERPL BCP-MCNC: 4.1 G/DL (ref 3.4–5)
ANION GAP SERPL CALC-SCNC: 14 MMOL/L (ref 10–20)
APPEARANCE UR: CLEAR
BILIRUB UR STRIP.AUTO-MCNC: NEGATIVE MG/DL
BUN SERPL-MCNC: 35 MG/DL (ref 6–23)
CALCIUM SERPL-MCNC: 9.1 MG/DL (ref 8.6–10.3)
CHLORIDE SERPL-SCNC: 107 MMOL/L (ref 98–107)
CO2 SERPL-SCNC: 24 MMOL/L (ref 21–32)
COLOR UR: COLORLESS
CREAT SERPL-MCNC: 1.97 MG/DL (ref 0.5–1.05)
CREAT UR-MCNC: 34.9 MG/DL (ref 20–320)
CREAT UR-MCNC: 34.9 MG/DL (ref 20–320)
EGFRCR SERPLBLD CKD-EPI 2021: 30 ML/MIN/1.73M*2
ERYTHROCYTE [DISTWIDTH] IN BLOOD BY AUTOMATED COUNT: 15 % (ref 11.5–14.5)
GLUCOSE SERPL-MCNC: 95 MG/DL (ref 74–99)
GLUCOSE UR STRIP.AUTO-MCNC: NORMAL MG/DL
HCT VFR BLD AUTO: 34.7 % (ref 36–46)
HGB BLD-MCNC: 11.2 G/DL (ref 12–16)
INR PPP: 3 (ref 0.9–1.1)
KETONES UR STRIP.AUTO-MCNC: NEGATIVE MG/DL
LEUKOCYTE ESTERASE UR QL STRIP.AUTO: NEGATIVE
MCH RBC QN AUTO: 29.9 PG (ref 26–34)
MCHC RBC AUTO-ENTMCNC: 32.3 G/DL (ref 32–36)
MCV RBC AUTO: 93 FL (ref 80–100)
MICROALBUMIN UR-MCNC: 240.9 MG/L
MICROALBUMIN/CREAT UR: 690.3 UG/MG CREAT
NITRITE UR QL STRIP.AUTO: NEGATIVE
NRBC BLD-RTO: 0 /100 WBCS (ref 0–0)
PH UR STRIP.AUTO: 6.5 [PH]
PHOSPHATE SERPL-MCNC: 3.8 MG/DL (ref 2.5–4.9)
PLATELET # BLD AUTO: 207 X10*3/UL (ref 150–450)
POTASSIUM SERPL-SCNC: 3.8 MMOL/L (ref 3.5–5.3)
PROT UR STRIP.AUTO-MCNC: ABNORMAL MG/DL
PROT UR-ACNC: 40 MG/DL (ref 5–24)
PROT/CREAT UR: 1.15 MG/MG CREAT (ref 0–0.17)
PROTHROMBIN TIME: 34.2 SECONDS (ref 9.8–12.8)
PTH-INTACT SERPL-MCNC: 69.8 PG/ML (ref 18.5–88)
RBC # BLD AUTO: 3.75 X10*6/UL (ref 4–5.2)
RBC # UR STRIP.AUTO: NEGATIVE /UL
RBC #/AREA URNS AUTO: NORMAL /HPF
SODIUM SERPL-SCNC: 141 MMOL/L (ref 136–145)
SP GR UR STRIP.AUTO: 1.01
UROBILINOGEN UR STRIP.AUTO-MCNC: NORMAL MG/DL
WBC # BLD AUTO: 6.2 X10*3/UL (ref 4.4–11.3)
WBC #/AREA URNS AUTO: NORMAL /HPF

## 2024-08-26 PROCEDURE — 85610 PROTHROMBIN TIME: CPT | Mod: OUT | Performed by: FAMILY MEDICINE

## 2024-08-26 PROCEDURE — 82570 ASSAY OF URINE CREATININE: CPT

## 2024-08-26 PROCEDURE — 36415 COLL VENOUS BLD VENIPUNCTURE: CPT

## 2024-08-26 PROCEDURE — 85027 COMPLETE CBC AUTOMATED: CPT

## 2024-08-26 PROCEDURE — 80069 RENAL FUNCTION PANEL: CPT

## 2024-08-26 PROCEDURE — 82306 VITAMIN D 25 HYDROXY: CPT

## 2024-08-26 PROCEDURE — 82043 UR ALBUMIN QUANTITATIVE: CPT

## 2024-08-26 PROCEDURE — 83970 ASSAY OF PARATHORMONE: CPT

## 2024-08-26 PROCEDURE — 36415 COLL VENOUS BLD VENIPUNCTURE: CPT | Performed by: FAMILY MEDICINE

## 2024-08-26 PROCEDURE — 84156 ASSAY OF PROTEIN URINE: CPT

## 2024-08-26 PROCEDURE — 84443 ASSAY THYROID STIM HORMONE: CPT

## 2024-08-26 PROCEDURE — 86255 FLUORESCENT ANTIBODY SCREEN: CPT

## 2024-08-26 PROCEDURE — 81001 URINALYSIS AUTO W/SCOPE: CPT

## 2024-08-27 ENCOUNTER — APPOINTMENT (OUTPATIENT)
Dept: NEPHROLOGY | Facility: CLINIC | Age: 54
End: 2024-08-27
Payer: COMMERCIAL

## 2024-08-27 VITALS
OXYGEN SATURATION: 95 % | HEART RATE: 92 BPM | DIASTOLIC BLOOD PRESSURE: 65 MMHG | WEIGHT: 211 LBS | TEMPERATURE: 97.8 F | SYSTOLIC BLOOD PRESSURE: 101 MMHG | BODY MASS INDEX: 41.43 KG/M2 | HEIGHT: 60 IN

## 2024-08-27 DIAGNOSIS — R60.0 LEG EDEMA: ICD-10-CM

## 2024-08-27 DIAGNOSIS — E55.9 VITAMIN D DEFICIENCY: ICD-10-CM

## 2024-08-27 LAB — TSH SERPL-ACNC: 1.15 MIU/L (ref 0.44–3.98)

## 2024-08-27 PROCEDURE — 3008F BODY MASS INDEX DOCD: CPT | Performed by: INTERNAL MEDICINE

## 2024-08-27 PROCEDURE — 1036F TOBACCO NON-USER: CPT | Performed by: INTERNAL MEDICINE

## 2024-08-27 PROCEDURE — 3074F SYST BP LT 130 MM HG: CPT | Performed by: INTERNAL MEDICINE

## 2024-08-27 PROCEDURE — 99213 OFFICE O/P EST LOW 20 MIN: CPT | Performed by: INTERNAL MEDICINE

## 2024-08-27 PROCEDURE — 4010F ACE/ARB THERAPY RXD/TAKEN: CPT | Performed by: INTERNAL MEDICINE

## 2024-08-27 PROCEDURE — 3078F DIAST BP <80 MM HG: CPT | Performed by: INTERNAL MEDICINE

## 2024-08-27 PROCEDURE — 3060F POS MICROALBUMINURIA REV: CPT | Performed by: INTERNAL MEDICINE

## 2024-08-27 RX ORDER — FUROSEMIDE 40 MG/1
40 TABLET ORAL
Qty: 180 TABLET | Refills: 3 | Status: SHIPPED | OUTPATIENT
Start: 2024-08-27 | End: 2025-08-27

## 2024-08-27 RX ORDER — LISINOPRIL 10 MG/1
10 TABLET ORAL DAILY
Qty: 90 TABLET | Refills: 3 | Status: SHIPPED | OUTPATIENT
Start: 2024-08-27 | End: 2025-08-27

## 2024-08-27 NOTE — PROGRESS NOTES
Chief Complaint: Follow up CKD    Notes pain over kidneys continued  Denies nausea, vomiting, chest pain, dyspnea  No urinary symptoms  Diligently working on low salt diet    NAD  Sclera AI s inj  MMM, no sores  Deferred secondary to COVID  L>R LE edema 1+  No tremor  No rash    CKD stage 4  Volume overload  BP at goal  Proteinuria just over 1 g, better    PLAR2 antibody pending  Increase diuretic to 80 mg q am and 40 q pm for one week  Decrease lisinopril 10 mg daily  Ultrasound kidneys  Labs and follow up in three months

## 2024-08-28 ENCOUNTER — DOCUMENTATION (OUTPATIENT)
Dept: PRIMARY CARE | Facility: CLINIC | Age: 54
End: 2024-08-28
Payer: MEDICARE

## 2024-08-28 LAB — PLA2R IGG SERPL QL IF: NORMAL

## 2024-08-28 NOTE — PROGRESS NOTES
Kay, luis enrique!  Summer has gone too fast, has been enjoyable, hopefully for you as well!    Unfortunately here that Janene is having some depression type symptoms and stress eating, she is currently admitted it looks like to a nursing home and being seen by Dr Kyra Rivera so I have looped him in to this message to address this issue with Janene since she is admitted to the nursing home and being seen by that doctor for now.    Thank you,  MD Scott Valdovinos, DO Aaron Chavez,  Hope you are enjoying last few days of summer!  Re: Janene  She's gained 10 pounds of weight since the Spring, states she's stress eating a lot and going through some difficulties.  I added on a TSH to her labs today but was wondering if considering an antidepressant would help? She hasn't been on one before but is interested.  Best,  Kay

## 2024-08-29 ENCOUNTER — NURSING HOME VISIT (OUTPATIENT)
Dept: POST ACUTE CARE | Facility: EXTERNAL LOCATION | Age: 54
End: 2024-08-29
Payer: MEDICARE

## 2024-08-29 ENCOUNTER — LAB REQUISITION (OUTPATIENT)
Dept: LAB | Facility: HOSPITAL | Age: 54
End: 2024-08-29
Payer: MEDICARE

## 2024-08-29 ENCOUNTER — HOSPITAL ENCOUNTER (OUTPATIENT)
Dept: DIALYSIS | Facility: HOSPITAL | Age: 54
Setting detail: DIALYSIS SERIES
Discharge: HOME | End: 2024-08-29
Payer: MEDICARE

## 2024-08-29 VITALS
WEIGHT: 211.64 LBS | BODY MASS INDEX: 41.33 KG/M2 | SYSTOLIC BLOOD PRESSURE: 140 MMHG | HEART RATE: 95 BPM | DIASTOLIC BLOOD PRESSURE: 83 MMHG

## 2024-08-29 DIAGNOSIS — N18.4 ANEMIA DUE TO STAGE 4 CHRONIC KIDNEY DISEASE (MULTI): ICD-10-CM

## 2024-08-29 DIAGNOSIS — N18.4 CHRONIC KIDNEY DISEASE, STAGE 4 (SEVERE) (MULTI): ICD-10-CM

## 2024-08-29 DIAGNOSIS — D63.1 ANEMIA DUE TO STAGE 4 CHRONIC KIDNEY DISEASE (MULTI): Primary | ICD-10-CM

## 2024-08-29 DIAGNOSIS — D68.61 ANTI-PHOSPHOLIPID SYNDROME (MULTI): Primary | ICD-10-CM

## 2024-08-29 DIAGNOSIS — N18.9 CHRONIC KIDNEY DISEASE, UNSPECIFIED: ICD-10-CM

## 2024-08-29 DIAGNOSIS — J45.40 MODERATE PERSISTENT ASTHMA, UNSPECIFIED WHETHER COMPLICATED (HHS-HCC): ICD-10-CM

## 2024-08-29 DIAGNOSIS — N18.4 ANEMIA DUE TO STAGE 4 CHRONIC KIDNEY DISEASE (MULTI): Primary | ICD-10-CM

## 2024-08-29 DIAGNOSIS — E11.22 TYPE 2 DIABETES MELLITUS WITH DIABETIC CHRONIC KIDNEY DISEASE, UNSPECIFIED CKD STAGE, UNSPECIFIED WHETHER LONG TERM INSULIN USE (MULTI): ICD-10-CM

## 2024-08-29 DIAGNOSIS — R54 AGE-RELATED PHYSICAL DEBILITY: ICD-10-CM

## 2024-08-29 DIAGNOSIS — D63.1 ANEMIA DUE TO STAGE 4 CHRONIC KIDNEY DISEASE (MULTI): ICD-10-CM

## 2024-08-29 DIAGNOSIS — Z79.01 LONG TERM (CURRENT) USE OF ANTICOAGULANTS: ICD-10-CM

## 2024-08-29 DIAGNOSIS — N18.5 CKD (CHRONIC KIDNEY DISEASE), STAGE V (MULTI): ICD-10-CM

## 2024-08-29 LAB
INR PPP: 2.2 (ref 0.9–1.1)
PROTHROMBIN TIME: 25.1 SECONDS (ref 9.8–12.8)

## 2024-08-29 PROCEDURE — 36415 COLL VENOUS BLD VENIPUNCTURE: CPT | Performed by: FAMILY MEDICINE

## 2024-08-29 PROCEDURE — 2500000004 HC RX 250 GENERAL PHARMACY W/ HCPCS (ALT 636 FOR OP/ED): Mod: JZ,JG

## 2024-08-29 PROCEDURE — 85610 PROTHROMBIN TIME: CPT | Mod: OUT | Performed by: FAMILY MEDICINE

## 2024-08-29 PROCEDURE — 99308 SBSQ NF CARE LOW MDM 20: CPT | Performed by: FAMILY MEDICINE

## 2024-08-29 PROCEDURE — 96372 THER/PROPH/DIAG INJ SC/IM: CPT

## 2024-08-29 RX ORDER — EPINEPHRINE 0.3 MG/.3ML
0.3 INJECTION SUBCUTANEOUS EVERY 5 MIN PRN
OUTPATIENT
Start: 2024-09-23

## 2024-08-29 RX ORDER — FAMOTIDINE 10 MG/ML
20 INJECTION INTRAVENOUS ONCE AS NEEDED
OUTPATIENT
Start: 2024-09-23

## 2024-08-29 RX ORDER — ALBUTEROL SULFATE 0.83 MG/ML
3 SOLUTION RESPIRATORY (INHALATION) AS NEEDED
OUTPATIENT
Start: 2024-09-23

## 2024-08-29 ASSESSMENT — PAIN SCALES - GENERAL: PAINLEVEL: 0-NO PAIN

## 2024-08-29 NOTE — PROGRESS NOTES
..How is your energy? good  How is your appetite?  Good   Any nausea and vomiting after eating? none  Any diarrhea or constipation? none  Any SOB? denies  Any Edema in lower extremities? +2 on right leg, +1 left leg  Any questions regarding your Kidney disease?  None   DO you have a Dialysis accesses?  None  Pt received Darbepoetin 150mcg in left deltoid.

## 2024-08-29 NOTE — PROGRESS NOTES
Subjective   Patient ID: Janene Pinedo is a 54 y.o. female who is long term resident being seen and evaluated for multiple medical problems.    Debility, anti phospholipid syndrome, ckd, dm2, anemia, asthma         Review of Systems   Reason unable to perform ROS: debility.       Objective   There were no vitals taken for this visit.    Physical Exam  Vitals and nursing note reviewed.   Constitutional:       Appearance: She is ill-appearing.   HENT:      Head: Normocephalic and atraumatic.      Nose: Nose normal.      Mouth/Throat:      Mouth: Mucous membranes are moist.      Pharynx: Oropharynx is clear.   Eyes:      Extraocular Movements: Extraocular movements intact.      Conjunctiva/sclera: Conjunctivae normal.      Pupils: Pupils are equal, round, and reactive to light.   Cardiovascular:      Rate and Rhythm: Normal rate and regular rhythm.      Pulses: Normal pulses.   Pulmonary:      Effort: Pulmonary effort is normal.      Breath sounds: Normal breath sounds.   Abdominal:      General: Bowel sounds are normal.      Palpations: Abdomen is soft.   Musculoskeletal:         General: Tenderness present. Normal range of motion.      Cervical back: Normal range of motion and neck supple.   Skin:     General: Skin is warm and dry.      Capillary Refill: Capillary refill takes less than 2 seconds.      Coloration: Skin is pale.   Neurological:      Mental Status: She is alert. Mental status is at baseline.   Psychiatric:         Mood and Affect: Mood normal.         Behavior: Behavior normal.         Thought Content: Thought content normal.         Judgment: Judgment normal.         Assessment/Plan   Problem List Items Addressed This Visit             ICD-10-CM    Anemia D64.9    Anti-phospholipid syndrome (Multi) - Primary D68.61    Asthma ([facility]) J45.909    CKD (chronic kidney disease), stage V (Multi) N18.5    Type 2 diabetes mellitus with diabetic chronic kidney disease, unspecified CKD stage, unspecified  whether long term insulin use (Multi) E11.22     Other Visit Diagnoses         Codes    Age-related physical debility     R54             Goals    None

## 2024-08-29 NOTE — LETTER
Patient: Janene Pinedo  : 1970    Encounter Date: 2024    Subjective  Patient ID: Janene Pinedo is a 54 y.o. female who is long term resident being seen and evaluated for multiple medical problems.    Debility, anti phospholipid syndrome, ckd, dm2, anemia, asthma         Review of Systems   Reason unable to perform ROS: debility.       Objective  There were no vitals taken for this visit.    Physical Exam  Vitals and nursing note reviewed.   Constitutional:       Appearance: She is ill-appearing.   HENT:      Head: Normocephalic and atraumatic.      Nose: Nose normal.      Mouth/Throat:      Mouth: Mucous membranes are moist.      Pharynx: Oropharynx is clear.   Eyes:      Extraocular Movements: Extraocular movements intact.      Conjunctiva/sclera: Conjunctivae normal.      Pupils: Pupils are equal, round, and reactive to light.   Cardiovascular:      Rate and Rhythm: Normal rate and regular rhythm.      Pulses: Normal pulses.   Pulmonary:      Effort: Pulmonary effort is normal.      Breath sounds: Normal breath sounds.   Abdominal:      General: Bowel sounds are normal.      Palpations: Abdomen is soft.   Musculoskeletal:         General: Tenderness present. Normal range of motion.      Cervical back: Normal range of motion and neck supple.   Skin:     General: Skin is warm and dry.      Capillary Refill: Capillary refill takes less than 2 seconds.      Coloration: Skin is pale.   Neurological:      Mental Status: She is alert. Mental status is at baseline.   Psychiatric:         Mood and Affect: Mood normal.         Behavior: Behavior normal.         Thought Content: Thought content normal.         Judgment: Judgment normal.         Assessment/Plan  Problem List Items Addressed This Visit             ICD-10-CM    Anemia D64.9    Anti-phospholipid syndrome (Multi) - Primary D68.61    Asthma ([facility]) J45.909    CKD (chronic kidney disease), stage V (Multi) N18.5    Type 2 diabetes mellitus with  diabetic chronic kidney disease, unspecified CKD stage, unspecified whether long term insulin use (Multi) E11.22     Other Visit Diagnoses         Codes    Age-related physical debility     R54             Goals    None           Electronically Signed By: Kyra Rivera MD   8/29/24 10:44 AM

## 2024-09-03 ENCOUNTER — LAB REQUISITION (OUTPATIENT)
Dept: LAB | Facility: HOSPITAL | Age: 54
End: 2024-09-03
Payer: MEDICARE

## 2024-09-03 DIAGNOSIS — Z79.01 LONG TERM (CURRENT) USE OF ANTICOAGULANTS: ICD-10-CM

## 2024-09-03 DIAGNOSIS — N18.9 CHRONIC KIDNEY DISEASE, UNSPECIFIED: ICD-10-CM

## 2024-09-03 LAB
ANION GAP SERPL CALC-SCNC: 13 MMOL/L (ref 10–20)
BUN SERPL-MCNC: 30 MG/DL (ref 6–23)
CALCIUM SERPL-MCNC: 8.9 MG/DL (ref 8.6–10.3)
CHLORIDE SERPL-SCNC: 106 MMOL/L (ref 98–107)
CO2 SERPL-SCNC: 26 MMOL/L (ref 21–32)
CREAT SERPL-MCNC: 1.66 MG/DL (ref 0.5–1.05)
EGFRCR SERPLBLD CKD-EPI 2021: 37 ML/MIN/1.73M*2
ERYTHROCYTE [DISTWIDTH] IN BLOOD BY AUTOMATED COUNT: 15.4 % (ref 11.5–14.5)
GLUCOSE SERPL-MCNC: 131 MG/DL (ref 74–99)
HCT VFR BLD AUTO: 33.3 % (ref 36–46)
HGB BLD-MCNC: 10.6 G/DL (ref 12–16)
INR PPP: 2.6 (ref 0.9–1.1)
MCH RBC QN AUTO: 30.1 PG (ref 26–34)
MCHC RBC AUTO-ENTMCNC: 31.8 G/DL (ref 32–36)
MCV RBC AUTO: 95 FL (ref 80–100)
NRBC BLD-RTO: 0.4 /100 WBCS (ref 0–0)
PLATELET # BLD AUTO: 186 X10*3/UL (ref 150–450)
POTASSIUM SERPL-SCNC: 3.7 MMOL/L (ref 3.5–5.3)
PROTHROMBIN TIME: 29.6 SECONDS (ref 9.8–12.8)
RBC # BLD AUTO: 3.52 X10*6/UL (ref 4–5.2)
SODIUM SERPL-SCNC: 141 MMOL/L (ref 136–145)
WBC # BLD AUTO: 7.4 X10*3/UL (ref 4.4–11.3)

## 2024-09-03 PROCEDURE — 36415 COLL VENOUS BLD VENIPUNCTURE: CPT | Performed by: FAMILY MEDICINE

## 2024-09-03 PROCEDURE — 36415 COLL VENOUS BLD VENIPUNCTURE: CPT | Mod: OUT | Performed by: FAMILY MEDICINE

## 2024-09-03 PROCEDURE — 85610 PROTHROMBIN TIME: CPT | Mod: OUT | Performed by: FAMILY MEDICINE

## 2024-09-03 PROCEDURE — 85027 COMPLETE CBC AUTOMATED: CPT | Mod: OUT | Performed by: FAMILY MEDICINE

## 2024-09-03 PROCEDURE — 80048 BASIC METABOLIC PNL TOTAL CA: CPT | Mod: OUT | Performed by: FAMILY MEDICINE

## 2024-09-04 ENCOUNTER — LAB REQUISITION (OUTPATIENT)
Dept: LAB | Facility: HOSPITAL | Age: 54
End: 2024-09-04
Payer: MEDICARE

## 2024-09-04 DIAGNOSIS — N18.9 CHRONIC KIDNEY DISEASE, UNSPECIFIED: ICD-10-CM

## 2024-09-04 LAB
ANION GAP SERPL CALC-SCNC: 12 MMOL/L (ref 10–20)
BASOPHILS # BLD AUTO: 0.03 X10*3/UL (ref 0–0.1)
BASOPHILS NFR BLD AUTO: 0.5 %
BUN SERPL-MCNC: 33 MG/DL (ref 6–23)
CALCIUM SERPL-MCNC: 8.4 MG/DL (ref 8.6–10.3)
CHLORIDE SERPL-SCNC: 108 MMOL/L (ref 98–107)
CO2 SERPL-SCNC: 25 MMOL/L (ref 21–32)
CREAT SERPL-MCNC: 1.96 MG/DL (ref 0.5–1.05)
EGFRCR SERPLBLD CKD-EPI 2021: 30 ML/MIN/1.73M*2
EOSINOPHIL # BLD AUTO: 0.15 X10*3/UL (ref 0–0.7)
EOSINOPHIL NFR BLD AUTO: 2.4 %
ERYTHROCYTE [DISTWIDTH] IN BLOOD BY AUTOMATED COUNT: 15.5 % (ref 11.5–14.5)
GLUCOSE SERPL-MCNC: 133 MG/DL (ref 74–99)
HCT VFR BLD AUTO: 30.8 % (ref 36–46)
HGB BLD-MCNC: 9.8 G/DL (ref 12–16)
IMM GRANULOCYTES # BLD AUTO: 0.09 X10*3/UL (ref 0–0.7)
IMM GRANULOCYTES NFR BLD AUTO: 1.4 % (ref 0–0.9)
LYMPHOCYTES # BLD AUTO: 1.7 X10*3/UL (ref 1.2–4.8)
LYMPHOCYTES NFR BLD AUTO: 26.8 %
MCH RBC QN AUTO: 30.1 PG (ref 26–34)
MCHC RBC AUTO-ENTMCNC: 31.8 G/DL (ref 32–36)
MCV RBC AUTO: 95 FL (ref 80–100)
MONOCYTES # BLD AUTO: 0.58 X10*3/UL (ref 0.1–1)
MONOCYTES NFR BLD AUTO: 9.1 %
NEUTROPHILS # BLD AUTO: 3.79 X10*3/UL (ref 1.2–7.7)
NEUTROPHILS NFR BLD AUTO: 59.8 %
NRBC BLD-RTO: 0.5 /100 WBCS (ref 0–0)
PLATELET # BLD AUTO: 178 X10*3/UL (ref 150–450)
POTASSIUM SERPL-SCNC: 3.7 MMOL/L (ref 3.5–5.3)
RBC # BLD AUTO: 3.26 X10*6/UL (ref 4–5.2)
SODIUM SERPL-SCNC: 141 MMOL/L (ref 136–145)
WBC # BLD AUTO: 6.3 X10*3/UL (ref 4.4–11.3)

## 2024-09-04 PROCEDURE — 85025 COMPLETE CBC W/AUTO DIFF WBC: CPT | Mod: OUT | Performed by: FAMILY MEDICINE

## 2024-09-04 PROCEDURE — 80048 BASIC METABOLIC PNL TOTAL CA: CPT | Mod: OUT | Performed by: FAMILY MEDICINE

## 2024-09-04 PROCEDURE — 36415 COLL VENOUS BLD VENIPUNCTURE: CPT | Mod: OUT | Performed by: FAMILY MEDICINE

## 2024-09-05 ENCOUNTER — LAB REQUISITION (OUTPATIENT)
Dept: LAB | Facility: HOSPITAL | Age: 54
End: 2024-09-05
Payer: MEDICARE

## 2024-09-05 DIAGNOSIS — R06.02 SHORTNESS OF BREATH: ICD-10-CM

## 2024-09-05 DIAGNOSIS — N17.9 ACUTE KIDNEY FAILURE, UNSPECIFIED (CMS-HCC): ICD-10-CM

## 2024-09-05 DIAGNOSIS — N18.9 CHRONIC KIDNEY DISEASE, UNSPECIFIED: ICD-10-CM

## 2024-09-05 DIAGNOSIS — J45.20 MILD INTERMITTENT ASTHMA, UNCOMPLICATED (HHS-HCC): ICD-10-CM

## 2024-09-05 DIAGNOSIS — Z79.01 LONG TERM (CURRENT) USE OF ANTICOAGULANTS: ICD-10-CM

## 2024-09-05 LAB
ANION GAP SERPL CALC-SCNC: 13 MMOL/L (ref 10–20)
BASOPHILS # BLD AUTO: 0.02 X10*3/UL (ref 0–0.1)
BASOPHILS NFR BLD AUTO: 0.3 %
BNP SERPL-MCNC: 3 PG/ML (ref 0–99)
BUN SERPL-MCNC: 34 MG/DL (ref 6–23)
CALCIUM SERPL-MCNC: 8.5 MG/DL (ref 8.6–10.3)
CHLORIDE SERPL-SCNC: 106 MMOL/L (ref 98–107)
CO2 SERPL-SCNC: 25 MMOL/L (ref 21–32)
CREAT SERPL-MCNC: 1.83 MG/DL (ref 0.5–1.05)
EGFRCR SERPLBLD CKD-EPI 2021: 33 ML/MIN/1.73M*2
EOSINOPHIL # BLD AUTO: 0.18 X10*3/UL (ref 0–0.7)
EOSINOPHIL NFR BLD AUTO: 2.5 %
ERYTHROCYTE [DISTWIDTH] IN BLOOD BY AUTOMATED COUNT: 15.7 % (ref 11.5–14.5)
GLUCOSE SERPL-MCNC: 127 MG/DL (ref 74–99)
HCT VFR BLD AUTO: 30.9 % (ref 36–46)
HGB BLD-MCNC: 9.9 G/DL (ref 12–16)
IMM GRANULOCYTES # BLD AUTO: 0.08 X10*3/UL (ref 0–0.7)
IMM GRANULOCYTES NFR BLD AUTO: 1.1 % (ref 0–0.9)
INR PPP: 2.4 (ref 0.9–1.1)
LYMPHOCYTES # BLD AUTO: 1.89 X10*3/UL (ref 1.2–4.8)
LYMPHOCYTES NFR BLD AUTO: 26.3 %
MCH RBC QN AUTO: 30.2 PG (ref 26–34)
MCHC RBC AUTO-ENTMCNC: 32 G/DL (ref 32–36)
MCV RBC AUTO: 94 FL (ref 80–100)
MONOCYTES # BLD AUTO: 0.63 X10*3/UL (ref 0.1–1)
MONOCYTES NFR BLD AUTO: 8.8 %
NEUTROPHILS # BLD AUTO: 4.38 X10*3/UL (ref 1.2–7.7)
NEUTROPHILS NFR BLD AUTO: 61 %
NRBC BLD-RTO: 0.3 /100 WBCS (ref 0–0)
PLATELET # BLD AUTO: 181 X10*3/UL (ref 150–450)
POTASSIUM SERPL-SCNC: 3.5 MMOL/L (ref 3.5–5.3)
PROTHROMBIN TIME: 26.9 SECONDS (ref 9.8–12.8)
RBC # BLD AUTO: 3.28 X10*6/UL (ref 4–5.2)
SODIUM SERPL-SCNC: 140 MMOL/L (ref 136–145)
WBC # BLD AUTO: 7.2 X10*3/UL (ref 4.4–11.3)

## 2024-09-05 PROCEDURE — 85610 PROTHROMBIN TIME: CPT | Mod: OUT | Performed by: FAMILY MEDICINE

## 2024-09-05 PROCEDURE — 85025 COMPLETE CBC W/AUTO DIFF WBC: CPT | Mod: OUT | Performed by: FAMILY MEDICINE

## 2024-09-05 PROCEDURE — 80048 BASIC METABOLIC PNL TOTAL CA: CPT | Mod: OUT | Performed by: FAMILY MEDICINE

## 2024-09-05 PROCEDURE — 82374 ASSAY BLOOD CARBON DIOXIDE: CPT | Mod: OUT | Performed by: FAMILY MEDICINE

## 2024-09-05 PROCEDURE — 83880 ASSAY OF NATRIURETIC PEPTIDE: CPT | Mod: OUT | Performed by: FAMILY MEDICINE

## 2024-09-05 PROCEDURE — 36415 COLL VENOUS BLD VENIPUNCTURE: CPT | Mod: OUT | Performed by: FAMILY MEDICINE

## 2024-09-05 PROCEDURE — 36415 COLL VENOUS BLD VENIPUNCTURE: CPT | Performed by: FAMILY MEDICINE

## 2024-09-09 ENCOUNTER — NURSING HOME VISIT (OUTPATIENT)
Dept: POST ACUTE CARE | Facility: EXTERNAL LOCATION | Age: 54
End: 2024-09-09
Payer: MEDICARE

## 2024-09-09 ENCOUNTER — LAB REQUISITION (OUTPATIENT)
Dept: LAB | Facility: HOSPITAL | Age: 54
End: 2024-09-09
Payer: MEDICARE

## 2024-09-09 DIAGNOSIS — N18.9 CHRONIC KIDNEY DISEASE, UNSPECIFIED: ICD-10-CM

## 2024-09-09 DIAGNOSIS — J45.40 MODERATE PERSISTENT ASTHMA, UNSPECIFIED WHETHER COMPLICATED (HHS-HCC): ICD-10-CM

## 2024-09-09 DIAGNOSIS — N18.5 CKD (CHRONIC KIDNEY DISEASE), STAGE V (MULTI): ICD-10-CM

## 2024-09-09 DIAGNOSIS — M21.371 BILATERAL FOOT-DROP: ICD-10-CM

## 2024-09-09 DIAGNOSIS — M21.372 BILATERAL FOOT-DROP: ICD-10-CM

## 2024-09-09 DIAGNOSIS — D50.9 MICROCYTIC ANEMIA: ICD-10-CM

## 2024-09-09 DIAGNOSIS — R54 AGE-RELATED PHYSICAL DEBILITY: ICD-10-CM

## 2024-09-09 DIAGNOSIS — N17.9 ACUTE KIDNEY FAILURE, UNSPECIFIED (CMS-HCC): ICD-10-CM

## 2024-09-09 DIAGNOSIS — D68.61 ANTI-PHOSPHOLIPID SYNDROME (MULTI): Primary | ICD-10-CM

## 2024-09-09 DIAGNOSIS — E11.22 TYPE 2 DIABETES MELLITUS WITH DIABETIC CHRONIC KIDNEY DISEASE, UNSPECIFIED CKD STAGE, UNSPECIFIED WHETHER LONG TERM INSULIN USE (MULTI): ICD-10-CM

## 2024-09-09 DIAGNOSIS — Z79.01 LONG TERM (CURRENT) USE OF ANTICOAGULANTS: ICD-10-CM

## 2024-09-09 LAB
ANION GAP SERPL CALC-SCNC: 13 MMOL/L (ref 10–20)
BASOPHILS # BLD AUTO: 0.02 X10*3/UL (ref 0–0.1)
BASOPHILS NFR BLD AUTO: 0.4 %
BUN SERPL-MCNC: 37 MG/DL (ref 6–23)
CALCIUM SERPL-MCNC: 8.7 MG/DL (ref 8.6–10.3)
CHLORIDE SERPL-SCNC: 106 MMOL/L (ref 98–107)
CO2 SERPL-SCNC: 27 MMOL/L (ref 21–32)
CREAT SERPL-MCNC: 2.03 MG/DL (ref 0.5–1.05)
EGFRCR SERPLBLD CKD-EPI 2021: 29 ML/MIN/1.73M*2
EOSINOPHIL # BLD AUTO: 0.16 X10*3/UL (ref 0–0.7)
EOSINOPHIL NFR BLD AUTO: 2.8 %
ERYTHROCYTE [DISTWIDTH] IN BLOOD BY AUTOMATED COUNT: 15.4 % (ref 11.5–14.5)
GLUCOSE SERPL-MCNC: 123 MG/DL (ref 74–99)
HCT VFR BLD AUTO: 32.8 % (ref 36–46)
HGB BLD-MCNC: 10.2 G/DL (ref 12–16)
IMM GRANULOCYTES # BLD AUTO: 0.03 X10*3/UL (ref 0–0.7)
IMM GRANULOCYTES NFR BLD AUTO: 0.5 % (ref 0–0.9)
INR PPP: 3.5 (ref 0.9–1.1)
LYMPHOCYTES # BLD AUTO: 1.68 X10*3/UL (ref 1.2–4.8)
LYMPHOCYTES NFR BLD AUTO: 29.5 %
MCH RBC QN AUTO: 29.4 PG (ref 26–34)
MCHC RBC AUTO-ENTMCNC: 31.1 G/DL (ref 32–36)
MCV RBC AUTO: 95 FL (ref 80–100)
MONOCYTES # BLD AUTO: 0.47 X10*3/UL (ref 0.1–1)
MONOCYTES NFR BLD AUTO: 8.2 %
NEUTROPHILS # BLD AUTO: 3.34 X10*3/UL (ref 1.2–7.7)
NEUTROPHILS NFR BLD AUTO: 58.6 %
NRBC BLD-RTO: 0 /100 WBCS (ref 0–0)
PLATELET # BLD AUTO: 174 X10*3/UL (ref 150–450)
POTASSIUM SERPL-SCNC: 3.7 MMOL/L (ref 3.5–5.3)
PROTHROMBIN TIME: 39.6 SECONDS (ref 9.8–12.8)
RBC # BLD AUTO: 3.47 X10*6/UL (ref 4–5.2)
SODIUM SERPL-SCNC: 142 MMOL/L (ref 136–145)
WBC # BLD AUTO: 5.7 X10*3/UL (ref 4.4–11.3)

## 2024-09-09 PROCEDURE — 99308 SBSQ NF CARE LOW MDM 20: CPT | Performed by: FAMILY MEDICINE

## 2024-09-09 PROCEDURE — 36415 COLL VENOUS BLD VENIPUNCTURE: CPT | Mod: OUT | Performed by: FAMILY MEDICINE

## 2024-09-09 PROCEDURE — 82565 ASSAY OF CREATININE: CPT | Mod: OUT | Performed by: FAMILY MEDICINE

## 2024-09-09 PROCEDURE — 85610 PROTHROMBIN TIME: CPT | Mod: OUT | Performed by: FAMILY MEDICINE

## 2024-09-09 PROCEDURE — 85025 COMPLETE CBC W/AUTO DIFF WBC: CPT | Mod: OUT | Performed by: FAMILY MEDICINE

## 2024-09-09 NOTE — LETTER
Patient: Janene Pinedo  : 1970    Encounter Date: 2024    Subjective  Patient ID: Janene Pinedo is a 54 y.o. female who is long term resident being seen and evaluated for multiple medical problems.    Antiphospholipid syndrome, asthma, bilateral foot drop, ckd, DM2         Review of Systems   Reason unable to perform ROS: debility.       Objective  There were no vitals taken for this visit.    Physical Exam  Vitals and nursing note reviewed.   Constitutional:       Appearance: She is ill-appearing.   HENT:      Head: Normocephalic and atraumatic.      Nose: Nose normal.      Mouth/Throat:      Mouth: Mucous membranes are moist.      Pharynx: Oropharynx is clear.   Eyes:      Extraocular Movements: Extraocular movements intact.      Conjunctiva/sclera: Conjunctivae normal.      Pupils: Pupils are equal, round, and reactive to light.   Cardiovascular:      Rate and Rhythm: Normal rate and regular rhythm.      Pulses: Normal pulses.   Pulmonary:      Effort: Pulmonary effort is normal.      Breath sounds: Normal breath sounds.   Abdominal:      General: Bowel sounds are normal.      Palpations: Abdomen is soft.   Musculoskeletal:         General: Tenderness present. Normal range of motion.      Cervical back: Normal range of motion and neck supple.   Skin:     General: Skin is warm and dry.      Capillary Refill: Capillary refill takes less than 2 seconds.      Coloration: Skin is pale.   Neurological:      Mental Status: She is alert. Mental status is at baseline.      Motor: Weakness present.      Gait: Gait abnormal.   Psychiatric:         Mood and Affect: Affect is flat.         Behavior: Behavior normal.         Thought Content: Thought content normal.         Judgment: Judgment normal.         Assessment/Plan  Problem List Items Addressed This Visit             ICD-10-CM    Microcytic anemia D50.9    Anti-phospholipid syndrome (Multi) - Primary D68.61    Asthma ([facility]) J45.909    Bilateral  foot-drop M21.371, M21.372    CKD (chronic kidney disease), stage V (Multi) N18.5    Type 2 diabetes mellitus with diabetic chronic kidney disease, unspecified CKD stage, unspecified whether long term insulin use (Multi) E11.22     Other Visit Diagnoses         Codes    Age-related physical debility     R54             Goals    None           Electronically Signed By: Kyra Rivera MD   9/10/24  1:28 PM

## 2024-09-10 NOTE — PROGRESS NOTES
Subjective   Patient ID: Janene Pinedo is a 54 y.o. female who is long term resident being seen and evaluated for multiple medical problems.    Antiphospholipid syndrome, asthma, bilateral foot drop, ckd, DM2         Review of Systems   Reason unable to perform ROS: debility.       Objective   There were no vitals taken for this visit.    Physical Exam  Vitals and nursing note reviewed.   Constitutional:       Appearance: She is ill-appearing.   HENT:      Head: Normocephalic and atraumatic.      Nose: Nose normal.      Mouth/Throat:      Mouth: Mucous membranes are moist.      Pharynx: Oropharynx is clear.   Eyes:      Extraocular Movements: Extraocular movements intact.      Conjunctiva/sclera: Conjunctivae normal.      Pupils: Pupils are equal, round, and reactive to light.   Cardiovascular:      Rate and Rhythm: Normal rate and regular rhythm.      Pulses: Normal pulses.   Pulmonary:      Effort: Pulmonary effort is normal.      Breath sounds: Normal breath sounds.   Abdominal:      General: Bowel sounds are normal.      Palpations: Abdomen is soft.   Musculoskeletal:         General: Tenderness present. Normal range of motion.      Cervical back: Normal range of motion and neck supple.   Skin:     General: Skin is warm and dry.      Capillary Refill: Capillary refill takes less than 2 seconds.      Coloration: Skin is pale.   Neurological:      Mental Status: She is alert. Mental status is at baseline.      Motor: Weakness present.      Gait: Gait abnormal.   Psychiatric:         Mood and Affect: Affect is flat.         Behavior: Behavior normal.         Thought Content: Thought content normal.         Judgment: Judgment normal.         Assessment/Plan   Problem List Items Addressed This Visit             ICD-10-CM    Microcytic anemia D50.9    Anti-phospholipid syndrome (Multi) - Primary D68.61    Asthma ([facility]) J45.909    Bilateral foot-drop M21.371, M21.372    CKD (chronic kidney disease), stage V  (Multi) N18.5    Type 2 diabetes mellitus with diabetic chronic kidney disease, unspecified CKD stage, unspecified whether long term insulin use (Multi) E11.22     Other Visit Diagnoses         Codes    Age-related physical debility     R54             Goals    None

## 2024-09-12 ENCOUNTER — LAB REQUISITION (OUTPATIENT)
Dept: LAB | Facility: HOSPITAL | Age: 54
End: 2024-09-12
Payer: MEDICARE

## 2024-09-12 DIAGNOSIS — J44.9 CHRONIC OBSTRUCTIVE PULMONARY DISEASE, UNSPECIFIED: ICD-10-CM

## 2024-09-12 DIAGNOSIS — N18.9 CHRONIC KIDNEY DISEASE, UNSPECIFIED: ICD-10-CM

## 2024-09-12 DIAGNOSIS — E87.6 HYPOKALEMIA: ICD-10-CM

## 2024-09-12 LAB
ANION GAP SERPL CALC-SCNC: 13 MMOL/L (ref 10–20)
BUN SERPL-MCNC: 36 MG/DL (ref 6–23)
CALCIUM SERPL-MCNC: 8.5 MG/DL (ref 8.6–10.3)
CHLORIDE SERPL-SCNC: 106 MMOL/L (ref 98–107)
CO2 SERPL-SCNC: 26 MMOL/L (ref 21–32)
CREAT SERPL-MCNC: 1.83 MG/DL (ref 0.5–1.05)
EGFRCR SERPLBLD CKD-EPI 2021: 33 ML/MIN/1.73M*2
GLUCOSE SERPL-MCNC: 150 MG/DL (ref 74–99)
INR PPP: 1.4 (ref 0.9–1.1)
POTASSIUM SERPL-SCNC: 3.7 MMOL/L (ref 3.5–5.3)
PROTHROMBIN TIME: 16.3 SECONDS (ref 9.8–12.8)
SODIUM SERPL-SCNC: 141 MMOL/L (ref 136–145)

## 2024-09-12 PROCEDURE — 36415 COLL VENOUS BLD VENIPUNCTURE: CPT | Mod: OUT | Performed by: FAMILY MEDICINE

## 2024-09-12 PROCEDURE — 80048 BASIC METABOLIC PNL TOTAL CA: CPT | Mod: OUT | Performed by: FAMILY MEDICINE

## 2024-09-12 PROCEDURE — 85610 PROTHROMBIN TIME: CPT | Mod: OUT | Performed by: FAMILY MEDICINE

## 2024-09-16 ENCOUNTER — LAB REQUISITION (OUTPATIENT)
Dept: LAB | Facility: HOSPITAL | Age: 54
End: 2024-09-16
Payer: COMMERCIAL

## 2024-09-16 DIAGNOSIS — Z79.01 LONG TERM (CURRENT) USE OF ANTICOAGULANTS: ICD-10-CM

## 2024-09-16 LAB
INR PPP: 1.6 (ref 0.9–1.1)
PROTHROMBIN TIME: 18.2 SECONDS (ref 9.8–12.8)

## 2024-09-16 PROCEDURE — 85610 PROTHROMBIN TIME: CPT | Mod: OUT | Performed by: FAMILY MEDICINE

## 2024-09-16 PROCEDURE — 36415 COLL VENOUS BLD VENIPUNCTURE: CPT | Mod: OUT | Performed by: FAMILY MEDICINE

## 2024-09-19 ENCOUNTER — LAB REQUISITION (OUTPATIENT)
Dept: LAB | Facility: HOSPITAL | Age: 54
End: 2024-09-19
Payer: COMMERCIAL

## 2024-09-19 DIAGNOSIS — I48.91 UNSPECIFIED ATRIAL FIBRILLATION (MULTI): ICD-10-CM

## 2024-09-19 LAB
INR PPP: 2.7 (ref 0.9–1.1)
PROTHROMBIN TIME: 30.8 SECONDS (ref 9.8–12.8)

## 2024-09-19 PROCEDURE — 85610 PROTHROMBIN TIME: CPT | Mod: OUT | Performed by: FAMILY MEDICINE

## 2024-09-19 PROCEDURE — 36415 COLL VENOUS BLD VENIPUNCTURE: CPT | Mod: OUT | Performed by: FAMILY MEDICINE

## 2024-09-23 ENCOUNTER — LAB REQUISITION (OUTPATIENT)
Dept: LAB | Facility: HOSPITAL | Age: 54
End: 2024-09-23
Payer: MEDICARE

## 2024-09-23 DIAGNOSIS — Z79.01 LONG TERM (CURRENT) USE OF ANTICOAGULANTS: ICD-10-CM

## 2024-09-23 LAB
INR PPP: 3.7 (ref 0.9–1.1)
PROTHROMBIN TIME: 42.3 SECONDS (ref 9.8–12.8)

## 2024-09-23 PROCEDURE — 85610 PROTHROMBIN TIME: CPT | Mod: OUT | Performed by: FAMILY MEDICINE

## 2024-09-23 PROCEDURE — 36415 COLL VENOUS BLD VENIPUNCTURE: CPT | Mod: OUT | Performed by: FAMILY MEDICINE

## 2024-09-25 ENCOUNTER — HOSPITAL ENCOUNTER (OUTPATIENT)
Dept: DIALYSIS | Facility: HOSPITAL | Age: 54
Setting detail: DIALYSIS SERIES
Discharge: HOME | End: 2024-09-25
Payer: MEDICARE

## 2024-09-25 VITALS
HEART RATE: 102 BPM | BODY MASS INDEX: 41.03 KG/M2 | SYSTOLIC BLOOD PRESSURE: 122 MMHG | DIASTOLIC BLOOD PRESSURE: 80 MMHG | WEIGHT: 210.1 LBS

## 2024-09-25 DIAGNOSIS — D63.1 ANEMIA DUE TO STAGE 4 CHRONIC KIDNEY DISEASE (MULTI): Primary | ICD-10-CM

## 2024-09-25 DIAGNOSIS — N18.4 ANEMIA DUE TO STAGE 4 CHRONIC KIDNEY DISEASE (MULTI): Primary | ICD-10-CM

## 2024-09-25 DIAGNOSIS — N18.4 CHRONIC KIDNEY DISEASE, STAGE 4 (SEVERE) (MULTI): ICD-10-CM

## 2024-09-25 LAB
ALBUMIN SERPL BCP-MCNC: 4 G/DL (ref 3.4–5)
ANION GAP SERPL CALC-SCNC: 17 MMOL/L (ref 10–20)
BUN SERPL-MCNC: 37 MG/DL (ref 6–23)
CALCIUM SERPL-MCNC: 9.1 MG/DL (ref 8.6–10.6)
CHLORIDE SERPL-SCNC: 107 MMOL/L (ref 98–107)
CO2 SERPL-SCNC: 21 MMOL/L (ref 21–32)
CREAT SERPL-MCNC: 1.63 MG/DL (ref 0.5–1.05)
EGFRCR SERPLBLD CKD-EPI 2021: 38 ML/MIN/1.73M*2
ERYTHROCYTE [DISTWIDTH] IN BLOOD BY AUTOMATED COUNT: 14.6 % (ref 11.5–14.5)
FERRITIN SERPL-MCNC: 389 NG/ML (ref 8–150)
GLUCOSE SERPL-MCNC: 72 MG/DL (ref 74–99)
HBV SURFACE AG SERPL QL IA: NONREACTIVE
HCT VFR BLD AUTO: 32.1 % (ref 36–46)
HGB BLD-MCNC: 10.7 G/DL (ref 12–16)
IRON SATN MFR SERPL: 21 % (ref 25–45)
IRON SERPL-MCNC: 57 UG/DL (ref 35–150)
MCH RBC QN AUTO: 29.3 PG (ref 26–34)
MCHC RBC AUTO-ENTMCNC: 33.3 G/DL (ref 32–36)
MCV RBC AUTO: 88 FL (ref 80–100)
NRBC BLD-RTO: 0 /100 WBCS (ref 0–0)
PHOSPHATE SERPL-MCNC: 3.8 MG/DL (ref 2.5–4.9)
PLATELET # BLD AUTO: 173 X10*3/UL (ref 150–450)
POTASSIUM SERPL-SCNC: 3.8 MMOL/L (ref 3.5–5.3)
RBC # BLD AUTO: 3.65 X10*6/UL (ref 4–5.2)
SODIUM SERPL-SCNC: 141 MMOL/L (ref 136–145)
TIBC SERPL-MCNC: 272 UG/DL (ref 240–445)
UIBC SERPL-MCNC: 215 UG/DL (ref 110–370)
WBC # BLD AUTO: 7.2 X10*3/UL (ref 4.4–11.3)

## 2024-09-25 PROCEDURE — 83540 ASSAY OF IRON: CPT | Performed by: INTERNAL MEDICINE

## 2024-09-25 PROCEDURE — 36415 COLL VENOUS BLD VENIPUNCTURE: CPT | Performed by: NURSE PRACTITIONER

## 2024-09-25 PROCEDURE — 96372 THER/PROPH/DIAG INJ SC/IM: CPT

## 2024-09-25 PROCEDURE — 82728 ASSAY OF FERRITIN: CPT | Performed by: INTERNAL MEDICINE

## 2024-09-25 PROCEDURE — 85027 COMPLETE CBC AUTOMATED: CPT | Performed by: NURSE PRACTITIONER

## 2024-09-25 PROCEDURE — 36415 COLL VENOUS BLD VENIPUNCTURE: CPT

## 2024-09-25 PROCEDURE — 87340 HEPATITIS B SURFACE AG IA: CPT | Performed by: INTERNAL MEDICINE

## 2024-09-25 PROCEDURE — 80069 RENAL FUNCTION PANEL: CPT | Performed by: NURSE PRACTITIONER

## 2024-09-25 PROCEDURE — 36415 COLL VENOUS BLD VENIPUNCTURE: CPT | Performed by: INTERNAL MEDICINE

## 2024-09-25 PROCEDURE — 2500000004 HC RX 250 GENERAL PHARMACY W/ HCPCS (ALT 636 FOR OP/ED): Mod: JZ,JG

## 2024-09-25 RX ORDER — FAMOTIDINE 10 MG/ML
20 INJECTION INTRAVENOUS ONCE AS NEEDED
OUTPATIENT
Start: 2024-09-26

## 2024-09-25 RX ORDER — EPINEPHRINE 0.3 MG/.3ML
0.3 INJECTION SUBCUTANEOUS EVERY 5 MIN PRN
OUTPATIENT
Start: 2024-09-26

## 2024-09-25 RX ORDER — SERTRALINE HYDROCHLORIDE 25 MG/1
25 TABLET, FILM COATED ORAL DAILY
COMMUNITY

## 2024-09-25 RX ORDER — ALBUTEROL SULFATE 0.83 MG/ML
3 SOLUTION RESPIRATORY (INHALATION) AS NEEDED
OUTPATIENT
Start: 2024-09-26

## 2024-09-25 NOTE — PROGRESS NOTES
..How is your energy? Fair, taking naps throughout the day.   How is your appetite? Fair. Eats 3 meals a day   Any nausea and vomiting after eating? none  Any diarrhea or constipation? None  Any SOB? none  Any Edema in lower extremities? Right leg edema +1   Any questions regarding your Kidney disease? none  DO you have a Dialysis accesses?  None      Gave 150 mcg of aranesp in the right upper back arm subcutaneous   Obtained cbc and rfp from left ac. And obtained hep b antigen and iron studies

## 2024-09-26 ENCOUNTER — LAB REQUISITION (OUTPATIENT)
Dept: LAB | Facility: HOSPITAL | Age: 54
End: 2024-09-26
Payer: MEDICARE

## 2024-09-26 DIAGNOSIS — I48.91 UNSPECIFIED ATRIAL FIBRILLATION (MULTI): ICD-10-CM

## 2024-09-26 LAB
INR PPP: 1.5 (ref 0.9–1.1)
PROTHROMBIN TIME: 16.7 SECONDS (ref 9.8–12.8)

## 2024-09-26 PROCEDURE — 85610 PROTHROMBIN TIME: CPT | Mod: OUT | Performed by: FAMILY MEDICINE

## 2024-09-26 PROCEDURE — 36415 COLL VENOUS BLD VENIPUNCTURE: CPT | Mod: OUT | Performed by: FAMILY MEDICINE

## 2024-09-30 ENCOUNTER — LAB REQUISITION (OUTPATIENT)
Dept: LAB | Facility: HOSPITAL | Age: 54
End: 2024-09-30
Payer: MEDICARE

## 2024-09-30 DIAGNOSIS — Z79.01 LONG TERM (CURRENT) USE OF ANTICOAGULANTS: ICD-10-CM

## 2024-09-30 DIAGNOSIS — N18.9 CHRONIC KIDNEY DISEASE, UNSPECIFIED: ICD-10-CM

## 2024-09-30 LAB
INR PPP: 1.5 (ref 0.9–1.1)
PROTHROMBIN TIME: 17 SECONDS (ref 9.8–12.8)

## 2024-09-30 PROCEDURE — 36415 COLL VENOUS BLD VENIPUNCTURE: CPT | Mod: OUT | Performed by: FAMILY MEDICINE

## 2024-09-30 PROCEDURE — 85610 PROTHROMBIN TIME: CPT | Mod: OUT | Performed by: FAMILY MEDICINE

## 2024-10-03 ENCOUNTER — LAB REQUISITION (OUTPATIENT)
Dept: LAB | Facility: HOSPITAL | Age: 54
End: 2024-10-03
Payer: MEDICARE

## 2024-10-03 DIAGNOSIS — N18.9 CHRONIC KIDNEY DISEASE, UNSPECIFIED: ICD-10-CM

## 2024-10-03 LAB
INR PPP: 2.1 (ref 0.9–1.1)
PROTHROMBIN TIME: 24.2 SECONDS (ref 9.8–12.8)

## 2024-10-03 PROCEDURE — 36415 COLL VENOUS BLD VENIPUNCTURE: CPT | Mod: OUT | Performed by: FAMILY MEDICINE

## 2024-10-03 PROCEDURE — 85610 PROTHROMBIN TIME: CPT | Mod: OUT | Performed by: FAMILY MEDICINE

## 2024-10-07 ENCOUNTER — LAB REQUISITION (OUTPATIENT)
Dept: LAB | Facility: HOSPITAL | Age: 54
End: 2024-10-07
Payer: MEDICARE

## 2024-10-07 DIAGNOSIS — Z79.01 LONG TERM (CURRENT) USE OF ANTICOAGULANTS: ICD-10-CM

## 2024-10-07 LAB
INR PPP: 2.9 (ref 0.9–1.1)
PROTHROMBIN TIME: 33.3 SECONDS (ref 9.8–12.8)

## 2024-10-07 PROCEDURE — 85610 PROTHROMBIN TIME: CPT | Mod: OUT | Performed by: FAMILY MEDICINE

## 2024-10-07 PROCEDURE — 36415 COLL VENOUS BLD VENIPUNCTURE: CPT | Mod: OUT | Performed by: FAMILY MEDICINE

## 2024-10-10 ENCOUNTER — LAB REQUISITION (OUTPATIENT)
Dept: LAB | Facility: HOSPITAL | Age: 54
End: 2024-10-10
Payer: MEDICARE

## 2024-10-10 DIAGNOSIS — Z79.01 LONG TERM (CURRENT) USE OF ANTICOAGULANTS: ICD-10-CM

## 2024-10-10 DIAGNOSIS — N18.9 CHRONIC KIDNEY DISEASE, UNSPECIFIED: ICD-10-CM

## 2024-10-10 LAB
INR PPP: 3.5 (ref 0.9–1.1)
PROTHROMBIN TIME: 39.5 SECONDS (ref 9.8–12.8)

## 2024-10-10 PROCEDURE — 36415 COLL VENOUS BLD VENIPUNCTURE: CPT | Mod: OUT | Performed by: FAMILY MEDICINE

## 2024-10-10 PROCEDURE — 85610 PROTHROMBIN TIME: CPT | Mod: OUT | Performed by: FAMILY MEDICINE

## 2024-10-14 ENCOUNTER — LAB REQUISITION (OUTPATIENT)
Dept: LAB | Facility: HOSPITAL | Age: 54
End: 2024-10-14
Payer: MEDICARE

## 2024-10-14 DIAGNOSIS — Z79.01 LONG TERM (CURRENT) USE OF ANTICOAGULANTS: ICD-10-CM

## 2024-10-14 LAB
INR PPP: 2.4 (ref 0.9–1.1)
PROTHROMBIN TIME: 27.1 SECONDS (ref 9.8–12.8)

## 2024-10-14 PROCEDURE — 85610 PROTHROMBIN TIME: CPT | Mod: OUT | Performed by: FAMILY MEDICINE

## 2024-10-14 PROCEDURE — 36415 COLL VENOUS BLD VENIPUNCTURE: CPT | Mod: OUT | Performed by: FAMILY MEDICINE

## 2024-10-17 ENCOUNTER — LAB REQUISITION (OUTPATIENT)
Dept: LAB | Facility: HOSPITAL | Age: 54
End: 2024-10-17
Payer: MEDICARE

## 2024-10-17 DIAGNOSIS — N18.6 END STAGE RENAL DISEASE (MULTI): ICD-10-CM

## 2024-10-17 LAB
INR PPP: 2.5 (ref 0.9–1.1)
PROTHROMBIN TIME: 28.2 SECONDS (ref 9.8–12.8)

## 2024-10-17 PROCEDURE — 36415 COLL VENOUS BLD VENIPUNCTURE: CPT | Mod: OUT | Performed by: FAMILY MEDICINE

## 2024-10-17 PROCEDURE — 85610 PROTHROMBIN TIME: CPT | Mod: OUT | Performed by: FAMILY MEDICINE

## 2024-10-21 ENCOUNTER — LAB REQUISITION (OUTPATIENT)
Dept: LAB | Facility: HOSPITAL | Age: 54
End: 2024-10-21
Payer: MEDICARE

## 2024-10-21 DIAGNOSIS — N18.9 CHRONIC KIDNEY DISEASE, UNSPECIFIED: ICD-10-CM

## 2024-10-21 LAB
INR PPP: 2.2 (ref 0.9–1.1)
PROTHROMBIN TIME: 24.8 SECONDS (ref 9.8–12.8)

## 2024-10-21 PROCEDURE — 85610 PROTHROMBIN TIME: CPT | Mod: OUT | Performed by: FAMILY MEDICINE

## 2024-10-21 PROCEDURE — 36415 COLL VENOUS BLD VENIPUNCTURE: CPT | Mod: OUT | Performed by: FAMILY MEDICINE

## 2024-10-24 ENCOUNTER — HOSPITAL ENCOUNTER (OUTPATIENT)
Dept: DIALYSIS | Facility: HOSPITAL | Age: 54
Setting detail: DIALYSIS SERIES
Discharge: HOME | End: 2024-10-24
Payer: MEDICARE

## 2024-10-24 ENCOUNTER — LAB REQUISITION (OUTPATIENT)
Dept: LAB | Facility: HOSPITAL | Age: 54
End: 2024-10-24
Payer: MEDICARE

## 2024-10-24 VITALS
SYSTOLIC BLOOD PRESSURE: 117 MMHG | HEART RATE: 84 BPM | WEIGHT: 205.69 LBS | BODY MASS INDEX: 40.17 KG/M2 | DIASTOLIC BLOOD PRESSURE: 81 MMHG

## 2024-10-24 DIAGNOSIS — N18.9 CHRONIC KIDNEY DISEASE, UNSPECIFIED: ICD-10-CM

## 2024-10-24 DIAGNOSIS — N18.4 CHRONIC KIDNEY DISEASE, STAGE 4 (SEVERE) (MULTI): Primary | ICD-10-CM

## 2024-10-24 DIAGNOSIS — D63.1 ANEMIA DUE TO STAGE 4 CHRONIC KIDNEY DISEASE (MULTI): ICD-10-CM

## 2024-10-24 DIAGNOSIS — Z79.01 LONG TERM (CURRENT) USE OF ANTICOAGULANTS: ICD-10-CM

## 2024-10-24 DIAGNOSIS — N18.4 ANEMIA DUE TO STAGE 4 CHRONIC KIDNEY DISEASE (MULTI): ICD-10-CM

## 2024-10-24 LAB
ALBUMIN SERPL BCP-MCNC: 3.8 G/DL (ref 3.4–5)
ANION GAP SERPL CALC-SCNC: 16 MMOL/L (ref 10–20)
BUN SERPL-MCNC: 36 MG/DL (ref 6–23)
CALCIUM SERPL-MCNC: 8.7 MG/DL (ref 8.6–10.6)
CHLORIDE SERPL-SCNC: 106 MMOL/L (ref 98–107)
CO2 SERPL-SCNC: 23 MMOL/L (ref 21–32)
CREAT SERPL-MCNC: 1.96 MG/DL (ref 0.5–1.05)
EGFRCR SERPLBLD CKD-EPI 2021: 30 ML/MIN/1.73M*2
ERYTHROCYTE [DISTWIDTH] IN BLOOD BY AUTOMATED COUNT: 14.6 % (ref 11.5–14.5)
GLUCOSE SERPL-MCNC: 104 MG/DL (ref 74–99)
HCT VFR BLD AUTO: 33.4 % (ref 36–46)
HGB BLD-MCNC: 11 G/DL (ref 12–16)
INR PPP: 2.3 (ref 0.9–1.1)
MCH RBC QN AUTO: 29.2 PG (ref 26–34)
MCHC RBC AUTO-ENTMCNC: 32.9 G/DL (ref 32–36)
MCV RBC AUTO: 89 FL (ref 80–100)
NRBC BLD-RTO: 0 /100 WBCS (ref 0–0)
PHOSPHATE SERPL-MCNC: 4 MG/DL (ref 2.5–4.9)
PLATELET # BLD AUTO: 178 X10*3/UL (ref 150–450)
POTASSIUM SERPL-SCNC: 3.9 MMOL/L (ref 3.5–5.3)
PROTHROMBIN TIME: 26.6 SECONDS (ref 9.8–12.8)
RBC # BLD AUTO: 3.77 X10*6/UL (ref 4–5.2)
SODIUM SERPL-SCNC: 141 MMOL/L (ref 136–145)
WBC # BLD AUTO: 7.6 X10*3/UL (ref 4.4–11.3)

## 2024-10-24 PROCEDURE — 96374 THER/PROPH/DIAG INJ IV PUSH: CPT

## 2024-10-24 PROCEDURE — 2500000004 HC RX 250 GENERAL PHARMACY W/ HCPCS (ALT 636 FOR OP/ED): Mod: JZ,JG,SE

## 2024-10-24 PROCEDURE — 85027 COMPLETE CBC AUTOMATED: CPT | Performed by: NURSE PRACTITIONER

## 2024-10-24 PROCEDURE — 36415 COLL VENOUS BLD VENIPUNCTURE: CPT | Performed by: NURSE PRACTITIONER

## 2024-10-24 PROCEDURE — 36415 COLL VENOUS BLD VENIPUNCTURE: CPT | Mod: OUT | Performed by: FAMILY MEDICINE

## 2024-10-24 PROCEDURE — 36415 COLL VENOUS BLD VENIPUNCTURE: CPT

## 2024-10-24 PROCEDURE — 80069 RENAL FUNCTION PANEL: CPT | Performed by: NURSE PRACTITIONER

## 2024-10-24 PROCEDURE — 96372 THER/PROPH/DIAG INJ SC/IM: CPT

## 2024-10-24 PROCEDURE — 85610 PROTHROMBIN TIME: CPT | Mod: OUT | Performed by: FAMILY MEDICINE

## 2024-10-24 RX ORDER — ALBUTEROL SULFATE 0.83 MG/ML
3 SOLUTION RESPIRATORY (INHALATION) AS NEEDED
Status: CANCELLED | OUTPATIENT
Start: 2024-10-24

## 2024-10-24 RX ORDER — FAMOTIDINE 10 MG/ML
20 INJECTION INTRAVENOUS ONCE AS NEEDED
Status: CANCELLED | OUTPATIENT
Start: 2024-10-24

## 2024-10-24 RX ORDER — SERTRALINE HYDROCHLORIDE 50 MG/1
50 TABLET, FILM COATED ORAL DAILY
COMMUNITY
Start: 2024-10-17

## 2024-10-24 RX ORDER — SEMAGLUTIDE 1.34 MG/ML
1 INJECTION, SOLUTION SUBCUTANEOUS WEEKLY
COMMUNITY
Start: 2024-09-23

## 2024-10-24 RX ORDER — ALBUTEROL SULFATE 0.83 MG/ML
3 SOLUTION RESPIRATORY (INHALATION) AS NEEDED
OUTPATIENT
Start: 2024-11-20

## 2024-10-24 RX ORDER — EPINEPHRINE 0.3 MG/.3ML
0.3 INJECTION SUBCUTANEOUS EVERY 5 MIN PRN
Status: CANCELLED | OUTPATIENT
Start: 2024-10-24

## 2024-10-24 RX ORDER — EPINEPHRINE 0.3 MG/.3ML
0.3 INJECTION SUBCUTANEOUS EVERY 5 MIN PRN
OUTPATIENT
Start: 2024-11-20

## 2024-10-24 RX ORDER — FAMOTIDINE 10 MG/ML
20 INJECTION INTRAVENOUS ONCE AS NEEDED
OUTPATIENT
Start: 2024-11-20

## 2024-10-24 ASSESSMENT — PAIN SCALES - GENERAL: PAINLEVEL_OUTOF10: 0-NO PAIN

## 2024-10-24 NOTE — PROGRESS NOTES
How is your energy?  fair  How is your appetite?  Good ozempic helps curb appetite  feeling full quicker not so much midnight snacking  Any nausea and vomiting after eating?  no  Any diarrhea or constipation? no  Any SOB? no  Any Edema in lower extremities?  Yes right greater than left right leg swells wrapped in ace wrap  Have you had any recent infections?  no  Have you been on antibiotics recently? no  Any questions regarding your Kidney disease? no  DO you have a Dialysis accesses? No  CBC RFP drawn 150 mcg Aranesp given.

## 2024-10-28 ENCOUNTER — LAB REQUISITION (OUTPATIENT)
Dept: LAB | Facility: HOSPITAL | Age: 54
End: 2024-10-28
Payer: MEDICARE

## 2024-10-28 DIAGNOSIS — Z79.01 LONG TERM (CURRENT) USE OF ANTICOAGULANTS: ICD-10-CM

## 2024-10-28 LAB
INR PPP: 2.7 (ref 0.9–1.1)
PROTHROMBIN TIME: 30.5 SECONDS (ref 9.8–12.8)

## 2024-10-28 PROCEDURE — 36415 COLL VENOUS BLD VENIPUNCTURE: CPT | Mod: OUT | Performed by: FAMILY MEDICINE

## 2024-10-28 PROCEDURE — 85610 PROTHROMBIN TIME: CPT | Mod: OUT | Performed by: FAMILY MEDICINE

## 2024-10-31 ENCOUNTER — LAB REQUISITION (OUTPATIENT)
Dept: LAB | Facility: HOSPITAL | Age: 54
End: 2024-10-31
Payer: MEDICARE

## 2024-10-31 DIAGNOSIS — Z79.01 LONG TERM (CURRENT) USE OF ANTICOAGULANTS: ICD-10-CM

## 2024-10-31 DIAGNOSIS — N18.6 END STAGE RENAL DISEASE (MULTI): ICD-10-CM

## 2024-10-31 LAB
INR PPP: 3.4 (ref 0.9–1.1)
PROTHROMBIN TIME: 39.4 SECONDS (ref 9.8–12.8)

## 2024-10-31 PROCEDURE — 36415 COLL VENOUS BLD VENIPUNCTURE: CPT | Mod: OUT | Performed by: FAMILY MEDICINE

## 2024-10-31 PROCEDURE — 85610 PROTHROMBIN TIME: CPT | Mod: OUT | Performed by: FAMILY MEDICINE

## 2024-11-04 ENCOUNTER — LAB REQUISITION (OUTPATIENT)
Dept: LAB | Facility: HOSPITAL | Age: 54
End: 2024-11-04
Payer: MEDICARE

## 2024-11-04 DIAGNOSIS — Z79.01 LONG TERM (CURRENT) USE OF ANTICOAGULANTS: ICD-10-CM

## 2024-11-04 LAB
INR PPP: 1.5 (ref 0.9–1.1)
PROTHROMBIN TIME: 17.2 SECONDS (ref 9.8–12.8)

## 2024-11-04 PROCEDURE — 36415 COLL VENOUS BLD VENIPUNCTURE: CPT | Mod: OUT | Performed by: FAMILY MEDICINE

## 2024-11-04 PROCEDURE — 85610 PROTHROMBIN TIME: CPT | Mod: OUT | Performed by: FAMILY MEDICINE

## 2024-11-07 ENCOUNTER — LAB REQUISITION (OUTPATIENT)
Dept: LAB | Facility: HOSPITAL | Age: 54
End: 2024-11-07
Payer: MEDICARE

## 2024-11-07 DIAGNOSIS — N18.6 END STAGE RENAL DISEASE (MULTI): ICD-10-CM

## 2024-11-07 LAB
INR PPP: 2.1 (ref 0.9–1.1)
PROTHROMBIN TIME: 23.8 SECONDS (ref 9.8–12.8)

## 2024-11-07 PROCEDURE — 36415 COLL VENOUS BLD VENIPUNCTURE: CPT | Mod: OUT | Performed by: FAMILY MEDICINE

## 2024-11-07 PROCEDURE — 85610 PROTHROMBIN TIME: CPT | Mod: OUT | Performed by: FAMILY MEDICINE

## 2024-11-11 ENCOUNTER — LAB REQUISITION (OUTPATIENT)
Dept: LAB | Facility: HOSPITAL | Age: 54
End: 2024-11-11
Payer: MEDICARE

## 2024-11-11 ENCOUNTER — NURSING HOME VISIT (OUTPATIENT)
Dept: POST ACUTE CARE | Facility: EXTERNAL LOCATION | Age: 54
End: 2024-11-11
Payer: MEDICARE

## 2024-11-11 DIAGNOSIS — R54 AGE-RELATED PHYSICAL DEBILITY: ICD-10-CM

## 2024-11-11 DIAGNOSIS — Z79.01 LONG TERM (CURRENT) USE OF ANTICOAGULANTS: ICD-10-CM

## 2024-11-11 DIAGNOSIS — M21.372 BILATERAL FOOT-DROP: ICD-10-CM

## 2024-11-11 DIAGNOSIS — M21.371 BILATERAL FOOT-DROP: ICD-10-CM

## 2024-11-11 DIAGNOSIS — J45.40 MODERATE PERSISTENT ASTHMA, UNSPECIFIED WHETHER COMPLICATED (HHS-HCC): ICD-10-CM

## 2024-11-11 DIAGNOSIS — D68.61 ANTI-PHOSPHOLIPID SYNDROME (MULTI): Primary | ICD-10-CM

## 2024-11-11 DIAGNOSIS — E11.22 TYPE 2 DIABETES MELLITUS WITH DIABETIC CHRONIC KIDNEY DISEASE, UNSPECIFIED CKD STAGE, UNSPECIFIED WHETHER LONG TERM INSULIN USE: ICD-10-CM

## 2024-11-11 DIAGNOSIS — N18.5 CKD (CHRONIC KIDNEY DISEASE), STAGE V (MULTI): ICD-10-CM

## 2024-11-11 LAB
INR PPP: 2.6 (ref 0.9–1.1)
PROTHROMBIN TIME: 29.1 SECONDS (ref 9.8–12.8)

## 2024-11-11 PROCEDURE — 85610 PROTHROMBIN TIME: CPT | Mod: OUT | Performed by: FAMILY MEDICINE

## 2024-11-11 PROCEDURE — 36415 COLL VENOUS BLD VENIPUNCTURE: CPT | Mod: OUT | Performed by: FAMILY MEDICINE

## 2024-11-11 PROCEDURE — 99308 SBSQ NF CARE LOW MDM 20: CPT | Performed by: FAMILY MEDICINE

## 2024-11-11 NOTE — LETTER
Patient: Janene Pinedo  : 1970    Encounter Date: 2024    Subjective  Patient ID: Janene Pinedo is a 54 y.o. female who is long term resident being seen and evaluated for multiple medical problems.    Anti phospholipid syndrome, DM2, foot drop, ckd, asthma, debility         Review of Systems   Reason unable to perform ROS: debility.       Objective  There were no vitals taken for this visit.    Physical Exam  Vitals and nursing note reviewed.   Constitutional:       Appearance: She is ill-appearing.   HENT:      Head: Normocephalic and atraumatic.      Nose: Nose normal.      Mouth/Throat:      Mouth: Mucous membranes are moist.      Pharynx: Oropharynx is clear.   Eyes:      Extraocular Movements: Extraocular movements intact.      Conjunctiva/sclera: Conjunctivae normal.      Pupils: Pupils are equal, round, and reactive to light.   Cardiovascular:      Rate and Rhythm: Normal rate and regular rhythm.      Pulses: Normal pulses.   Pulmonary:      Effort: Pulmonary effort is normal.      Breath sounds: Normal breath sounds.   Abdominal:      General: Bowel sounds are normal.      Palpations: Abdomen is soft.   Musculoskeletal:         General: Tenderness present. Normal range of motion.      Cervical back: Normal range of motion and neck supple.   Skin:     General: Skin is warm and dry.      Capillary Refill: Capillary refill takes less than 2 seconds.      Coloration: Skin is pale.   Neurological:      Mental Status: Mental status is at baseline.      Motor: Weakness present.      Gait: Gait abnormal.   Psychiatric:         Attention and Perception: She is inattentive.         Mood and Affect: Affect is flat.         Behavior: Behavior normal.         Thought Content: Thought content normal.         Cognition and Memory: Cognition is impaired.         Judgment: Judgment normal.         Assessment/Plan  Problem List Items Addressed This Visit             ICD-10-CM    Anti-phospholipid syndrome  (Multi) - Primary D68.61    Asthma J45.909    Bilateral foot-drop M21.371, M21.372    CKD (chronic kidney disease), stage V (Multi) N18.5    Type 2 diabetes mellitus with diabetic chronic kidney disease, unspecified CKD stage, unspecified whether long term insulin use E11.22     Other Visit Diagnoses         Codes    Age-related physical debility     R54             Goals    None           Electronically Signed By: Kyra Rivera MD   11/11/24 12:22 PM

## 2024-11-11 NOTE — PROGRESS NOTES
Subjective   Patient ID: Janene Pinedo is a 54 y.o. female who is long term resident being seen and evaluated for multiple medical problems.    Anti phospholipid syndrome, DM2, foot drop, ckd, asthma, debility         Review of Systems   Reason unable to perform ROS: debility.       Objective   There were no vitals taken for this visit.    Physical Exam  Vitals and nursing note reviewed.   Constitutional:       Appearance: She is ill-appearing.   HENT:      Head: Normocephalic and atraumatic.      Nose: Nose normal.      Mouth/Throat:      Mouth: Mucous membranes are moist.      Pharynx: Oropharynx is clear.   Eyes:      Extraocular Movements: Extraocular movements intact.      Conjunctiva/sclera: Conjunctivae normal.      Pupils: Pupils are equal, round, and reactive to light.   Cardiovascular:      Rate and Rhythm: Normal rate and regular rhythm.      Pulses: Normal pulses.   Pulmonary:      Effort: Pulmonary effort is normal.      Breath sounds: Normal breath sounds.   Abdominal:      General: Bowel sounds are normal.      Palpations: Abdomen is soft.   Musculoskeletal:         General: Tenderness present. Normal range of motion.      Cervical back: Normal range of motion and neck supple.   Skin:     General: Skin is warm and dry.      Capillary Refill: Capillary refill takes less than 2 seconds.      Coloration: Skin is pale.   Neurological:      Mental Status: Mental status is at baseline.      Motor: Weakness present.      Gait: Gait abnormal.   Psychiatric:         Attention and Perception: She is inattentive.         Mood and Affect: Affect is flat.         Behavior: Behavior normal.         Thought Content: Thought content normal.         Cognition and Memory: Cognition is impaired.         Judgment: Judgment normal.         Assessment/Plan   Problem List Items Addressed This Visit             ICD-10-CM    Anti-phospholipid syndrome (Multi) - Primary D68.61    Asthma J45.909    Bilateral foot-drop  M21.371, M21.372    CKD (chronic kidney disease), stage V (Multi) N18.5    Type 2 diabetes mellitus with diabetic chronic kidney disease, unspecified CKD stage, unspecified whether long term insulin use E11.22     Other Visit Diagnoses         Codes    Age-related physical debility     R54             Goals    None

## 2024-11-14 ENCOUNTER — LAB REQUISITION (OUTPATIENT)
Dept: LAB | Facility: HOSPITAL | Age: 54
End: 2024-11-14
Payer: MEDICARE

## 2024-11-14 DIAGNOSIS — Z79.01 LONG TERM (CURRENT) USE OF ANTICOAGULANTS: ICD-10-CM

## 2024-11-14 LAB
INR PPP: 3.1 (ref 0.9–1.1)
PROTHROMBIN TIME: 35.7 SECONDS (ref 9.8–12.8)

## 2024-11-14 PROCEDURE — 85610 PROTHROMBIN TIME: CPT | Mod: OUT | Performed by: FAMILY MEDICINE

## 2024-11-14 PROCEDURE — 36415 COLL VENOUS BLD VENIPUNCTURE: CPT | Mod: OUT | Performed by: FAMILY MEDICINE

## 2024-11-18 ENCOUNTER — LAB REQUISITION (OUTPATIENT)
Dept: LAB | Facility: HOSPITAL | Age: 54
End: 2024-11-18
Payer: MEDICARE

## 2024-11-18 DIAGNOSIS — Z79.01 LONG TERM (CURRENT) USE OF ANTICOAGULANTS: ICD-10-CM

## 2024-11-18 DIAGNOSIS — N18.9 CHRONIC KIDNEY DISEASE, UNSPECIFIED: ICD-10-CM

## 2024-11-18 LAB
INR PPP: 4.1 (ref 0.9–1.1)
PROTHROMBIN TIME: 46.8 SECONDS (ref 9.8–12.8)

## 2024-11-18 PROCEDURE — 85610 PROTHROMBIN TIME: CPT | Mod: OUT | Performed by: FAMILY MEDICINE

## 2024-11-18 PROCEDURE — 36415 COLL VENOUS BLD VENIPUNCTURE: CPT | Mod: OUT | Performed by: FAMILY MEDICINE

## 2024-11-21 ENCOUNTER — HOSPITAL ENCOUNTER (OUTPATIENT)
Dept: DIALYSIS | Facility: HOSPITAL | Age: 54
Setting detail: DIALYSIS SERIES
Discharge: HOME | End: 2024-11-21
Payer: MEDICARE

## 2024-11-21 ENCOUNTER — LAB REQUISITION (OUTPATIENT)
Dept: LAB | Facility: HOSPITAL | Age: 54
End: 2024-11-21
Payer: MEDICARE

## 2024-11-21 VITALS
BODY MASS INDEX: 38.75 KG/M2 | SYSTOLIC BLOOD PRESSURE: 97 MMHG | HEART RATE: 88 BPM | DIASTOLIC BLOOD PRESSURE: 63 MMHG | WEIGHT: 198.41 LBS

## 2024-11-21 DIAGNOSIS — R60.0 LEG EDEMA: ICD-10-CM

## 2024-11-21 DIAGNOSIS — N18.4 CHRONIC KIDNEY DISEASE, STAGE 4 (SEVERE) (MULTI): ICD-10-CM

## 2024-11-21 DIAGNOSIS — D63.1 ANEMIA DUE TO STAGE 4 CHRONIC KIDNEY DISEASE (MULTI): Primary | ICD-10-CM

## 2024-11-21 DIAGNOSIS — N18.4 ANEMIA DUE TO STAGE 4 CHRONIC KIDNEY DISEASE (MULTI): Primary | ICD-10-CM

## 2024-11-21 DIAGNOSIS — Z79.01 LONG TERM (CURRENT) USE OF ANTICOAGULANTS: ICD-10-CM

## 2024-11-21 LAB
ALBUMIN SERPL BCP-MCNC: 4.1 G/DL (ref 3.4–5)
ANION GAP SERPL CALC-SCNC: 16 MMOL/L (ref 10–20)
BUN SERPL-MCNC: 37 MG/DL (ref 6–23)
CALCIUM SERPL-MCNC: 9 MG/DL (ref 8.6–10.6)
CHLORIDE SERPL-SCNC: 106 MMOL/L (ref 98–107)
CO2 SERPL-SCNC: 25 MMOL/L (ref 21–32)
CREAT SERPL-MCNC: 1.94 MG/DL (ref 0.5–1.05)
EGFRCR SERPLBLD CKD-EPI 2021: 30 ML/MIN/1.73M*2
ERYTHROCYTE [DISTWIDTH] IN BLOOD BY AUTOMATED COUNT: 14.5 % (ref 11.5–14.5)
FERRITIN SERPL-MCNC: 409 NG/ML (ref 8–150)
GLUCOSE SERPL-MCNC: 105 MG/DL (ref 74–99)
HCT VFR BLD AUTO: 31.9 % (ref 36–46)
HGB BLD-MCNC: 10.5 G/DL (ref 12–16)
INR PPP: 2.4 (ref 0.9–1.1)
IRON SATN MFR SERPL: 21 % (ref 25–45)
IRON SERPL-MCNC: 55 UG/DL (ref 35–150)
MCH RBC QN AUTO: 30.1 PG (ref 26–34)
MCHC RBC AUTO-ENTMCNC: 32.9 G/DL (ref 32–36)
MCV RBC AUTO: 91 FL (ref 80–100)
NRBC BLD-RTO: 0 /100 WBCS (ref 0–0)
PHOSPHATE SERPL-MCNC: 3.9 MG/DL (ref 2.5–4.9)
PLATELET # BLD AUTO: 180 X10*3/UL (ref 150–450)
POTASSIUM SERPL-SCNC: 3.9 MMOL/L (ref 3.5–5.3)
PROTHROMBIN TIME: 26.8 SECONDS (ref 9.8–12.8)
RBC # BLD AUTO: 3.49 X10*6/UL (ref 4–5.2)
SODIUM SERPL-SCNC: 143 MMOL/L (ref 136–145)
TIBC SERPL-MCNC: 260 UG/DL (ref 240–445)
UIBC SERPL-MCNC: 205 UG/DL (ref 110–370)
WBC # BLD AUTO: 7.8 X10*3/UL (ref 4.4–11.3)

## 2024-11-21 PROCEDURE — 36415 COLL VENOUS BLD VENIPUNCTURE: CPT | Mod: OUT | Performed by: FAMILY MEDICINE

## 2024-11-21 PROCEDURE — 82728 ASSAY OF FERRITIN: CPT | Performed by: INTERNAL MEDICINE

## 2024-11-21 PROCEDURE — 82668 ASSAY OF ERYTHROPOIETIN: CPT | Performed by: INTERNAL MEDICINE

## 2024-11-21 PROCEDURE — 2500000004 HC RX 250 GENERAL PHARMACY W/ HCPCS (ALT 636 FOR OP/ED): Mod: JZ,JG

## 2024-11-21 PROCEDURE — 96372 THER/PROPH/DIAG INJ SC/IM: CPT

## 2024-11-21 PROCEDURE — 85610 PROTHROMBIN TIME: CPT | Mod: OUT | Performed by: FAMILY MEDICINE

## 2024-11-21 PROCEDURE — 36415 COLL VENOUS BLD VENIPUNCTURE: CPT | Performed by: INTERNAL MEDICINE

## 2024-11-21 PROCEDURE — 83540 ASSAY OF IRON: CPT | Performed by: INTERNAL MEDICINE

## 2024-11-21 PROCEDURE — 85027 COMPLETE CBC AUTOMATED: CPT | Performed by: INTERNAL MEDICINE

## 2024-11-21 PROCEDURE — 80069 RENAL FUNCTION PANEL: CPT | Performed by: INTERNAL MEDICINE

## 2024-11-21 PROCEDURE — 36415 COLL VENOUS BLD VENIPUNCTURE: CPT

## 2024-11-21 RX ORDER — EPINEPHRINE 0.3 MG/.3ML
0.3 INJECTION SUBCUTANEOUS EVERY 5 MIN PRN
OUTPATIENT
Start: 2024-12-18

## 2024-11-21 RX ORDER — ALBUTEROL SULFATE 0.83 MG/ML
3 SOLUTION RESPIRATORY (INHALATION) AS NEEDED
OUTPATIENT
Start: 2024-12-18

## 2024-11-21 RX ORDER — FAMOTIDINE 10 MG/ML
20 INJECTION INTRAVENOUS ONCE AS NEEDED
OUTPATIENT
Start: 2024-12-18

## 2024-11-21 RX ORDER — FAMOTIDINE 10 MG/ML
20 INJECTION INTRAVENOUS ONCE AS NEEDED
Status: DISCONTINUED | OUTPATIENT
Start: 2024-11-21 | End: 2024-11-22 | Stop reason: HOSPADM

## 2024-11-21 RX ORDER — EPINEPHRINE 0.3 MG/.3ML
0.3 INJECTION SUBCUTANEOUS EVERY 5 MIN PRN
Status: DISCONTINUED | OUTPATIENT
Start: 2024-11-21 | End: 2024-11-22 | Stop reason: HOSPADM

## 2024-11-21 RX ORDER — ALBUTEROL SULFATE 0.83 MG/ML
3 SOLUTION RESPIRATORY (INHALATION) AS NEEDED
Status: DISCONTINUED | OUTPATIENT
Start: 2024-11-21 | End: 2024-11-22 | Stop reason: HOSPADM

## 2024-11-21 NOTE — PROGRESS NOTES
Name: Janene Pinedo   : 1970   MRN: [id number]     Reason for Visit: Lab draw and Aranesp injection      Vitals:    24 1303 24 1304   BP: 111/67 97/63   BP Location: Left arm Left arm   Patient Position: Sitting Standing   BP Cuff Size: Adult Adult   Pulse: 87 88   Weight: 90 kg (198 lb 6.6 oz)         How is your energy? Good  How is your appetite? okay  Any nausea and vomiting after eating? no  Any diarrhea or constipation? no  Any SOB? no  Any Edema in lower extremities? 1+ BLE, right slightly more  Have you had any recent infections? no  Have you been on antibiotics recently? no  Any questions regarding your Kidney disease? no  DO you have a Dialysis accesses? no    Medications Given in Clinic: Aranesp 150mcg subcutaneous       Nursing notes:  Labs drawn (CBC, RFP, Iron studies, erythropoetin level)  Aranesp given.      RN: Umm Arenas RN     24

## 2024-11-22 LAB — EPO SERPL-ACNC: 11 MU/ML (ref 4–27)

## 2024-11-25 ENCOUNTER — NURSING HOME VISIT (OUTPATIENT)
Dept: POST ACUTE CARE | Facility: EXTERNAL LOCATION | Age: 54
End: 2024-11-25
Payer: MEDICARE

## 2024-11-25 ENCOUNTER — LAB REQUISITION (OUTPATIENT)
Dept: LAB | Facility: HOSPITAL | Age: 54
End: 2024-11-25
Payer: MEDICARE

## 2024-11-25 DIAGNOSIS — M21.372 BILATERAL FOOT-DROP: ICD-10-CM

## 2024-11-25 DIAGNOSIS — D68.61 ANTI-PHOSPHOLIPID SYNDROME (MULTI): Primary | ICD-10-CM

## 2024-11-25 DIAGNOSIS — R54 AGE-RELATED PHYSICAL DEBILITY: ICD-10-CM

## 2024-11-25 DIAGNOSIS — E11.22 TYPE 2 DIABETES MELLITUS WITH DIABETIC CHRONIC KIDNEY DISEASE, UNSPECIFIED CKD STAGE, UNSPECIFIED WHETHER LONG TERM INSULIN USE: ICD-10-CM

## 2024-11-25 DIAGNOSIS — J45.40 MODERATE PERSISTENT ASTHMA, UNSPECIFIED WHETHER COMPLICATED (HHS-HCC): ICD-10-CM

## 2024-11-25 DIAGNOSIS — M21.371 BILATERAL FOOT-DROP: ICD-10-CM

## 2024-11-25 DIAGNOSIS — N18.5 CKD (CHRONIC KIDNEY DISEASE), STAGE V (MULTI): ICD-10-CM

## 2024-11-25 DIAGNOSIS — Z79.01 LONG TERM (CURRENT) USE OF ANTICOAGULANTS: ICD-10-CM

## 2024-11-25 LAB
INR PPP: 2.5 (ref 0.9–1.1)
PROTHROMBIN TIME: 28.4 SECONDS (ref 9.8–12.8)

## 2024-11-25 PROCEDURE — 99308 SBSQ NF CARE LOW MDM 20: CPT | Performed by: FAMILY MEDICINE

## 2024-11-25 PROCEDURE — 36415 COLL VENOUS BLD VENIPUNCTURE: CPT | Mod: OUT | Performed by: FAMILY MEDICINE

## 2024-11-25 PROCEDURE — 85610 PROTHROMBIN TIME: CPT | Mod: OUT | Performed by: FAMILY MEDICINE

## 2024-11-25 NOTE — LETTER
Patient: Janene Pinedo  : 1970    Encounter Date: 2024    Antiphospholipid syndrome, debility, asthma, foot drop, DM2, ckd, asthmaSubjective  Patient ID: Janene Pinedo is a 54 y.o. female who is long term resident being seen and evaluated for multiple medical problems.    Debility, asthma, anti phospholipid syndrome, ckd, foot drop         Review of Systems   Reason unable to perform ROS: debility.       Objective  There were no vitals taken for this visit.    Physical Exam  Vitals and nursing note reviewed.   Constitutional:       Appearance: She is ill-appearing.   HENT:      Head: Normocephalic and atraumatic.      Nose: Nose normal.      Mouth/Throat:      Mouth: Mucous membranes are moist.      Pharynx: Oropharynx is clear.   Eyes:      Extraocular Movements: Extraocular movements intact.      Conjunctiva/sclera: Conjunctivae normal.      Pupils: Pupils are equal, round, and reactive to light.   Cardiovascular:      Rate and Rhythm: Normal rate and regular rhythm.      Pulses: Normal pulses.   Pulmonary:      Effort: Pulmonary effort is normal.      Breath sounds: Normal breath sounds.   Abdominal:      General: Bowel sounds are normal.      Palpations: Abdomen is soft.   Musculoskeletal:         General: Tenderness present. Normal range of motion.      Cervical back: Normal range of motion and neck supple.   Skin:     General: Skin is warm and dry.      Capillary Refill: Capillary refill takes less than 2 seconds.      Coloration: Skin is pale.   Neurological:      Mental Status: Mental status is at baseline.      Sensory: Sensory deficit present.      Motor: Weakness present.      Coordination: Coordination abnormal.      Gait: Gait abnormal.      Deep Tendon Reflexes: Reflexes abnormal.   Psychiatric:         Mood and Affect: Affect is flat.         Behavior: Behavior normal.         Thought Content: Thought content normal.         Judgment: Judgment normal.         Assessment/Plan  Problem  List Items Addressed This Visit             ICD-10-CM    Anti-phospholipid syndrome (Multi) - Primary D68.61    Asthma J45.909    Bilateral foot-drop M21.371, M21.372    CKD (chronic kidney disease), stage V (Multi) N18.5    Type 2 diabetes mellitus with diabetic chronic kidney disease, unspecified CKD stage, unspecified whether long term insulin use E11.22     Other Visit Diagnoses         Codes    Age-related physical debility     R54             Goals    None           Electronically Signed By: Kyra Rivera MD   11/25/24 10:25 AM

## 2024-11-25 NOTE — PROGRESS NOTES
Antiphospholipid syndrome, debility, asthma, foot drop, DM2, ckd, asthmaSubjective   Patient ID: Janene Pinedo is a 54 y.o. female who is long term resident being seen and evaluated for multiple medical problems.    Debility, asthma, anti phospholipid syndrome, ckd, foot drop         Review of Systems   Reason unable to perform ROS: debility.       Objective   There were no vitals taken for this visit.    Physical Exam  Vitals and nursing note reviewed.   Constitutional:       Appearance: She is ill-appearing.   HENT:      Head: Normocephalic and atraumatic.      Nose: Nose normal.      Mouth/Throat:      Mouth: Mucous membranes are moist.      Pharynx: Oropharynx is clear.   Eyes:      Extraocular Movements: Extraocular movements intact.      Conjunctiva/sclera: Conjunctivae normal.      Pupils: Pupils are equal, round, and reactive to light.   Cardiovascular:      Rate and Rhythm: Normal rate and regular rhythm.      Pulses: Normal pulses.   Pulmonary:      Effort: Pulmonary effort is normal.      Breath sounds: Normal breath sounds.   Abdominal:      General: Bowel sounds are normal.      Palpations: Abdomen is soft.   Musculoskeletal:         General: Tenderness present. Normal range of motion.      Cervical back: Normal range of motion and neck supple.   Skin:     General: Skin is warm and dry.      Capillary Refill: Capillary refill takes less than 2 seconds.      Coloration: Skin is pale.   Neurological:      Mental Status: Mental status is at baseline.      Sensory: Sensory deficit present.      Motor: Weakness present.      Coordination: Coordination abnormal.      Gait: Gait abnormal.      Deep Tendon Reflexes: Reflexes abnormal.   Psychiatric:         Mood and Affect: Affect is flat.         Behavior: Behavior normal.         Thought Content: Thought content normal.         Judgment: Judgment normal.         Assessment/Plan   Problem List Items Addressed This Visit             ICD-10-CM     Anti-phospholipid syndrome (Multi) - Primary D68.61    Asthma J45.909    Bilateral foot-drop M21.371, M21.372    CKD (chronic kidney disease), stage V (Multi) N18.5    Type 2 diabetes mellitus with diabetic chronic kidney disease, unspecified CKD stage, unspecified whether long term insulin use E11.22     Other Visit Diagnoses         Codes    Age-related physical debility     R54             Goals    None

## 2024-11-29 ENCOUNTER — LAB REQUISITION (OUTPATIENT)
Dept: LAB | Facility: HOSPITAL | Age: 54
End: 2024-11-29
Payer: MEDICARE

## 2024-11-29 DIAGNOSIS — Z79.01 LONG TERM (CURRENT) USE OF ANTICOAGULANTS: ICD-10-CM

## 2024-11-29 LAB
INR PPP: 3.3 (ref 0.9–1.1)
PROTHROMBIN TIME: 38 SECONDS (ref 9.8–12.8)

## 2024-11-29 PROCEDURE — 36415 COLL VENOUS BLD VENIPUNCTURE: CPT | Mod: OUT | Performed by: FAMILY MEDICINE

## 2024-11-29 PROCEDURE — 85610 PROTHROMBIN TIME: CPT | Mod: OUT | Performed by: FAMILY MEDICINE

## 2024-12-02 ENCOUNTER — LAB REQUISITION (OUTPATIENT)
Dept: LAB | Facility: HOSPITAL | Age: 54
End: 2024-12-02
Payer: MEDICARE

## 2024-12-02 DIAGNOSIS — N18.6 END STAGE RENAL DISEASE (MULTI): ICD-10-CM

## 2024-12-02 LAB
INR PPP: 2 (ref 0.9–1.1)
PROTHROMBIN TIME: 22.2 SECONDS (ref 9.8–12.8)

## 2024-12-02 PROCEDURE — 85610 PROTHROMBIN TIME: CPT | Mod: OUT | Performed by: FAMILY MEDICINE

## 2024-12-02 PROCEDURE — 36415 COLL VENOUS BLD VENIPUNCTURE: CPT | Mod: OUT | Performed by: FAMILY MEDICINE

## 2024-12-05 ENCOUNTER — LAB REQUISITION (OUTPATIENT)
Dept: LAB | Facility: HOSPITAL | Age: 54
End: 2024-12-05
Payer: MEDICARE

## 2024-12-05 DIAGNOSIS — I12.0 HYPERTENSIVE CHRONIC KIDNEY DISEASE WITH STAGE 5 CHRONIC KIDNEY DISEASE OR END STAGE RENAL DISEASE: ICD-10-CM

## 2024-12-05 LAB
INR PPP: 3 (ref 0.9–1.1)
PROTHROMBIN TIME: 34.7 SECONDS (ref 9.8–12.8)

## 2024-12-05 PROCEDURE — 36415 COLL VENOUS BLD VENIPUNCTURE: CPT | Mod: OUT | Performed by: FAMILY MEDICINE

## 2024-12-05 PROCEDURE — 85610 PROTHROMBIN TIME: CPT | Mod: OUT | Performed by: FAMILY MEDICINE

## 2024-12-09 ENCOUNTER — LAB REQUISITION (OUTPATIENT)
Dept: LAB | Facility: HOSPITAL | Age: 54
End: 2024-12-09
Payer: MEDICARE

## 2024-12-09 ENCOUNTER — NURSING HOME VISIT (OUTPATIENT)
Dept: POST ACUTE CARE | Facility: EXTERNAL LOCATION | Age: 54
End: 2024-12-09
Payer: MEDICARE

## 2024-12-09 DIAGNOSIS — E11.22 TYPE 2 DIABETES MELLITUS WITH DIABETIC CHRONIC KIDNEY DISEASE, UNSPECIFIED CKD STAGE, UNSPECIFIED WHETHER LONG TERM INSULIN USE: ICD-10-CM

## 2024-12-09 DIAGNOSIS — D50.9 MICROCYTIC ANEMIA: Primary | ICD-10-CM

## 2024-12-09 DIAGNOSIS — D68.61 ANTI-PHOSPHOLIPID SYNDROME (MULTI): ICD-10-CM

## 2024-12-09 DIAGNOSIS — Z79.01 LONG TERM (CURRENT) USE OF ANTICOAGULANTS: ICD-10-CM

## 2024-12-09 DIAGNOSIS — R54 AGE-RELATED PHYSICAL DEBILITY: ICD-10-CM

## 2024-12-09 DIAGNOSIS — J45.40 MODERATE PERSISTENT ASTHMA, UNSPECIFIED WHETHER COMPLICATED (HHS-HCC): ICD-10-CM

## 2024-12-09 LAB
INR PPP: 2.6 (ref 0.9–1.1)
PROTHROMBIN TIME: 29.3 SECONDS (ref 9.8–12.8)

## 2024-12-09 PROCEDURE — 85610 PROTHROMBIN TIME: CPT | Mod: OUT | Performed by: FAMILY MEDICINE

## 2024-12-09 PROCEDURE — 99308 SBSQ NF CARE LOW MDM 20: CPT | Performed by: FAMILY MEDICINE

## 2024-12-09 PROCEDURE — 36415 COLL VENOUS BLD VENIPUNCTURE: CPT | Mod: OUT | Performed by: FAMILY MEDICINE

## 2024-12-09 NOTE — LETTER
Patient: Janene Pinedo  : 1970    Encounter Date: 2024    Subjective  Patient ID: Janene Pinedo is a 54 y.o. female who is long term resident being seen and evaluated for multiple medical problems.    Skilled nursing pt. Antiphospholipid syndrome, debility, asthma, anemia         Review of Systems   Reason unable to perform ROS: debility.       Objective  There were no vitals taken for this visit.    Physical Exam  Vitals and nursing note reviewed.   Constitutional:       Appearance: She is not ill-appearing.   HENT:      Head: Normocephalic and atraumatic.      Nose: Nose normal.      Mouth/Throat:      Mouth: Mucous membranes are moist.      Pharynx: Oropharynx is clear.   Eyes:      Extraocular Movements: Extraocular movements intact.      Conjunctiva/sclera: Conjunctivae normal.      Pupils: Pupils are equal, round, and reactive to light.   Cardiovascular:      Rate and Rhythm: Normal rate and regular rhythm.      Pulses: Normal pulses.   Pulmonary:      Effort: Pulmonary effort is normal.      Breath sounds: Normal breath sounds.   Abdominal:      General: Bowel sounds are normal.      Palpations: Abdomen is soft.   Musculoskeletal:         General: Normal range of motion.      Cervical back: Normal range of motion and neck supple.   Skin:     General: Skin is warm and dry.      Capillary Refill: Capillary refill takes less than 2 seconds.      Coloration: Skin is pale.   Neurological:      Mental Status: She is alert. Mental status is at baseline.      Motor: Weakness present.      Gait: Gait abnormal.   Psychiatric:         Attention and Perception: She is inattentive.         Behavior: Behavior normal.         Thought Content: Thought content normal.         Judgment: Judgment normal.         Assessment/Plan  Problem List Items Addressed This Visit             ICD-10-CM    Microcytic anemia - Primary D50.9    Anti-phospholipid syndrome (Multi) D68.61    Asthma J45.909    Type 2 diabetes  mellitus with diabetic chronic kidney disease, unspecified CKD stage, unspecified whether long term insulin use E11.22     Other Visit Diagnoses         Codes    Age-related physical debility     R54             Goals    None     Patient ID: Janeen Pinedo is a 54 y.o. female.    Procedures      Electronically Signed By: Kyra Rivera MD   12/9/24  8:40 AM

## 2024-12-09 NOTE — PROGRESS NOTES
Subjective   Patient ID: Janene Pinedo is a 54 y.o. female who is long term resident being seen and evaluated for multiple medical problems.    Skilled nursing pt. Antiphospholipid syndrome, debility, asthma, anemia         Review of Systems   Reason unable to perform ROS: debility.       Objective   There were no vitals taken for this visit.    Physical Exam  Vitals and nursing note reviewed.   Constitutional:       Appearance: She is not ill-appearing.   HENT:      Head: Normocephalic and atraumatic.      Nose: Nose normal.      Mouth/Throat:      Mouth: Mucous membranes are moist.      Pharynx: Oropharynx is clear.   Eyes:      Extraocular Movements: Extraocular movements intact.      Conjunctiva/sclera: Conjunctivae normal.      Pupils: Pupils are equal, round, and reactive to light.   Cardiovascular:      Rate and Rhythm: Normal rate and regular rhythm.      Pulses: Normal pulses.   Pulmonary:      Effort: Pulmonary effort is normal.      Breath sounds: Normal breath sounds.   Abdominal:      General: Bowel sounds are normal.      Palpations: Abdomen is soft.   Musculoskeletal:         General: Normal range of motion.      Cervical back: Normal range of motion and neck supple.   Skin:     General: Skin is warm and dry.      Capillary Refill: Capillary refill takes less than 2 seconds.      Coloration: Skin is pale.   Neurological:      Mental Status: She is alert. Mental status is at baseline.      Motor: Weakness present.      Gait: Gait abnormal.   Psychiatric:         Attention and Perception: She is inattentive.         Behavior: Behavior normal.         Thought Content: Thought content normal.         Judgment: Judgment normal.         Assessment/Plan   Problem List Items Addressed This Visit             ICD-10-CM    Microcytic anemia - Primary D50.9    Anti-phospholipid syndrome (Multi) D68.61    Asthma J45.909    Type 2 diabetes mellitus with diabetic chronic kidney disease, unspecified CKD stage,  unspecified whether long term insulin use E11.22     Other Visit Diagnoses         Codes    Age-related physical debility     R54             Goals    None     Patient ID: Janene Pinedo is a 54 y.o. female.    Procedures

## 2024-12-12 ENCOUNTER — LAB REQUISITION (OUTPATIENT)
Dept: LAB | Facility: HOSPITAL | Age: 54
End: 2024-12-12
Payer: MEDICARE

## 2024-12-12 DIAGNOSIS — Z79.01 LONG TERM (CURRENT) USE OF ANTICOAGULANTS: ICD-10-CM

## 2024-12-12 LAB
INR PPP: 3.5 (ref 0.9–1.1)
PROTHROMBIN TIME: 39.6 SECONDS (ref 9.8–12.8)

## 2024-12-12 PROCEDURE — 36415 COLL VENOUS BLD VENIPUNCTURE: CPT | Mod: OUT | Performed by: FAMILY MEDICINE

## 2024-12-12 PROCEDURE — 85610 PROTHROMBIN TIME: CPT | Mod: OUT | Performed by: FAMILY MEDICINE

## 2024-12-16 ENCOUNTER — LAB REQUISITION (OUTPATIENT)
Dept: LAB | Facility: HOSPITAL | Age: 54
End: 2024-12-16
Payer: MEDICARE

## 2024-12-16 DIAGNOSIS — N18.6 END STAGE RENAL DISEASE (MULTI): ICD-10-CM

## 2024-12-16 LAB
INR PPP: 1.6 (ref 0.9–1.1)
PROTHROMBIN TIME: 18.4 SECONDS (ref 9.8–12.8)

## 2024-12-16 PROCEDURE — 85610 PROTHROMBIN TIME: CPT | Mod: OUT | Performed by: FAMILY MEDICINE

## 2024-12-16 PROCEDURE — 36415 COLL VENOUS BLD VENIPUNCTURE: CPT | Mod: OUT | Performed by: FAMILY MEDICINE

## 2024-12-19 ENCOUNTER — LAB REQUISITION (OUTPATIENT)
Dept: LAB | Facility: HOSPITAL | Age: 54
End: 2024-12-19
Payer: MEDICARE

## 2024-12-19 ENCOUNTER — HOSPITAL ENCOUNTER (OUTPATIENT)
Dept: DIALYSIS | Facility: HOSPITAL | Age: 54
Setting detail: DIALYSIS SERIES
Discharge: HOME | End: 2024-12-19
Payer: MEDICARE

## 2024-12-19 VITALS
HEART RATE: 97 BPM | BODY MASS INDEX: 38.53 KG/M2 | WEIGHT: 197.31 LBS | SYSTOLIC BLOOD PRESSURE: 100 MMHG | DIASTOLIC BLOOD PRESSURE: 68 MMHG

## 2024-12-19 DIAGNOSIS — N18.4 ANEMIA DUE TO STAGE 4 CHRONIC KIDNEY DISEASE (MULTI): Primary | ICD-10-CM

## 2024-12-19 DIAGNOSIS — N18.4 CHRONIC KIDNEY DISEASE, STAGE 4 (SEVERE) (MULTI): ICD-10-CM

## 2024-12-19 DIAGNOSIS — N18.6 END STAGE RENAL DISEASE (MULTI): ICD-10-CM

## 2024-12-19 DIAGNOSIS — D63.1 ANEMIA DUE TO STAGE 4 CHRONIC KIDNEY DISEASE (MULTI): Primary | ICD-10-CM

## 2024-12-19 LAB
ALBUMIN SERPL BCP-MCNC: 4 G/DL (ref 3.4–5)
ANION GAP SERPL CALC-SCNC: 15 MMOL/L (ref 10–20)
BUN SERPL-MCNC: 38 MG/DL (ref 6–23)
CALCIUM SERPL-MCNC: 8.9 MG/DL (ref 8.6–10.6)
CHLORIDE SERPL-SCNC: 104 MMOL/L (ref 98–107)
CO2 SERPL-SCNC: 25 MMOL/L (ref 21–32)
CREAT SERPL-MCNC: 1.96 MG/DL (ref 0.5–1.05)
EGFRCR SERPLBLD CKD-EPI 2021: 30 ML/MIN/1.73M*2
ERYTHROCYTE [DISTWIDTH] IN BLOOD BY AUTOMATED COUNT: 14.7 % (ref 11.5–14.5)
GLUCOSE SERPL-MCNC: 82 MG/DL (ref 74–99)
HCT VFR BLD AUTO: 32 % (ref 36–46)
HGB BLD-MCNC: 10.2 G/DL (ref 12–16)
INR PPP: 1.5 (ref 0.9–1.1)
MCH RBC QN AUTO: 30.2 PG (ref 26–34)
MCHC RBC AUTO-ENTMCNC: 31.9 G/DL (ref 32–36)
MCV RBC AUTO: 95 FL (ref 80–100)
NRBC BLD-RTO: 0 /100 WBCS (ref 0–0)
PHOSPHATE SERPL-MCNC: 3.7 MG/DL (ref 2.5–4.9)
PLATELET # BLD AUTO: 170 X10*3/UL (ref 150–450)
POTASSIUM SERPL-SCNC: 3.8 MMOL/L (ref 3.5–5.3)
PROTHROMBIN TIME: 17.3 SECONDS (ref 9.8–12.8)
RBC # BLD AUTO: 3.38 X10*6/UL (ref 4–5.2)
SODIUM SERPL-SCNC: 140 MMOL/L (ref 136–145)
WBC # BLD AUTO: 8 X10*3/UL (ref 4.4–11.3)

## 2024-12-19 PROCEDURE — 36415 COLL VENOUS BLD VENIPUNCTURE: CPT | Performed by: INTERNAL MEDICINE

## 2024-12-19 PROCEDURE — 96372 THER/PROPH/DIAG INJ SC/IM: CPT

## 2024-12-19 PROCEDURE — 36415 COLL VENOUS BLD VENIPUNCTURE: CPT | Mod: OUT | Performed by: FAMILY MEDICINE

## 2024-12-19 PROCEDURE — 85610 PROTHROMBIN TIME: CPT | Mod: OUT | Performed by: FAMILY MEDICINE

## 2024-12-19 PROCEDURE — 2500000004 HC RX 250 GENERAL PHARMACY W/ HCPCS (ALT 636 FOR OP/ED): Mod: JZ,JG

## 2024-12-19 PROCEDURE — 36415 COLL VENOUS BLD VENIPUNCTURE: CPT

## 2024-12-19 PROCEDURE — 80069 RENAL FUNCTION PANEL: CPT | Performed by: INTERNAL MEDICINE

## 2024-12-19 PROCEDURE — 85027 COMPLETE CBC AUTOMATED: CPT | Performed by: INTERNAL MEDICINE

## 2024-12-19 RX ORDER — FAMOTIDINE 10 MG/ML
20 INJECTION INTRAVENOUS ONCE AS NEEDED
OUTPATIENT
Start: 2025-01-16

## 2024-12-19 RX ORDER — EPINEPHRINE 0.3 MG/.3ML
0.3 INJECTION SUBCUTANEOUS EVERY 5 MIN PRN
OUTPATIENT
Start: 2025-01-16

## 2024-12-19 RX ORDER — ALBUTEROL SULFATE 0.83 MG/ML
3 SOLUTION RESPIRATORY (INHALATION) AS NEEDED
OUTPATIENT
Start: 2025-01-16

## 2024-12-19 NOTE — PROGRESS NOTES
Name: Janene Pinedo   : 1970   MRN: [id number]     Reason for Visit: No chief complaint on file.      Vitals:    24 1500 24 1503   BP: 101/66 100/68   BP Location: Left arm Left arm   Patient Position: Sitting Standing   BP Cuff Size: Adult Adult   Pulse: 92 97   Weight: 89.5 kg (197 lb 5 oz)         How is your energy? Very low  How is your appetite? Very low  Any nausea and vomiting after eating? no  Any diarrhea or constipation? no  Any SOB? no  Any Edema in lower extremities? no  Have you had any recent infections? no  Have you been on antibiotics recently? no  Any questions regarding your Kidney disease? no  DO you have a Dialysis accesses? no    Hemoglobin   Date/Time Value Ref Range Status   2024 01:14 PM 10.5 (L) 12.0 - 16.0 g/dL Final   10/24/2024 01:41 PM 11.0 (L) 12.0 - 16.0 g/dL Final     Ferritin   Date/Time Value Ref Range Status   2024 01:14  (H) 8 - 150 ng/mL Final     % Saturation   Date/Time Value Ref Range Status   2024 01:14 PM 21 (L) 25 - 45 % Final        Medications Given in Clinic: Aranesp 150mcg      Nursing notes:  Cbc, rfp drawn, tolerated Aranesp 150mcg well      RN: Destiney Al RN     24

## 2024-12-20 ENCOUNTER — HOSPITAL ENCOUNTER (OUTPATIENT)
Dept: RADIOLOGY | Facility: CLINIC | Age: 54
Discharge: HOME | End: 2024-12-20
Payer: MEDICARE

## 2024-12-20 DIAGNOSIS — R60.0 LEG EDEMA: ICD-10-CM

## 2024-12-20 PROCEDURE — 76770 US EXAM ABDO BACK WALL COMP: CPT

## 2024-12-23 ENCOUNTER — LAB REQUISITION (OUTPATIENT)
Dept: LAB | Facility: HOSPITAL | Age: 54
End: 2024-12-23
Payer: MEDICARE

## 2024-12-23 DIAGNOSIS — Z79.01 LONG TERM (CURRENT) USE OF ANTICOAGULANTS: ICD-10-CM

## 2024-12-23 LAB
INR PPP: 2.3 (ref 0.9–1.1)
PROTHROMBIN TIME: 25.6 SECONDS (ref 9.8–12.8)

## 2024-12-23 PROCEDURE — 36415 COLL VENOUS BLD VENIPUNCTURE: CPT | Mod: OUT | Performed by: FAMILY MEDICINE

## 2024-12-23 PROCEDURE — 85610 PROTHROMBIN TIME: CPT | Mod: OUT | Performed by: FAMILY MEDICINE

## 2024-12-26 ENCOUNTER — LAB REQUISITION (OUTPATIENT)
Dept: LAB | Facility: HOSPITAL | Age: 54
End: 2024-12-26
Payer: MEDICARE

## 2024-12-26 DIAGNOSIS — Z79.01 LONG TERM (CURRENT) USE OF ANTICOAGULANTS: ICD-10-CM

## 2024-12-26 LAB
INR PPP: 2.7 (ref 0.9–1.1)
PROTHROMBIN TIME: 30.8 SECONDS (ref 9.8–12.8)

## 2024-12-26 PROCEDURE — 36415 COLL VENOUS BLD VENIPUNCTURE: CPT | Mod: OUT | Performed by: FAMILY MEDICINE

## 2024-12-26 PROCEDURE — 85610 PROTHROMBIN TIME: CPT | Mod: OUT | Performed by: FAMILY MEDICINE

## 2024-12-30 ENCOUNTER — LAB REQUISITION (OUTPATIENT)
Dept: LAB | Facility: HOSPITAL | Age: 54
End: 2024-12-30
Payer: MEDICARE

## 2024-12-30 DIAGNOSIS — Z79.01 LONG TERM (CURRENT) USE OF ANTICOAGULANTS: ICD-10-CM

## 2024-12-30 LAB
INR PPP: 2.9 (ref 0.9–1.1)
PROTHROMBIN TIME: 33.6 SECONDS (ref 9.8–12.8)

## 2024-12-30 PROCEDURE — 85610 PROTHROMBIN TIME: CPT | Mod: OUT | Performed by: FAMILY MEDICINE

## 2024-12-30 PROCEDURE — 36415 COLL VENOUS BLD VENIPUNCTURE: CPT | Mod: OUT | Performed by: FAMILY MEDICINE

## 2025-01-02 ENCOUNTER — LAB REQUISITION (OUTPATIENT)
Dept: LAB | Facility: HOSPITAL | Age: 55
End: 2025-01-02
Payer: MEDICARE

## 2025-01-02 DIAGNOSIS — Z79.01 LONG TERM (CURRENT) USE OF ANTICOAGULANTS: ICD-10-CM

## 2025-01-02 LAB
INR PPP: 3.2 (ref 0.9–1.1)
PROTHROMBIN TIME: 36.2 SECONDS (ref 9.8–12.8)

## 2025-01-02 PROCEDURE — 85610 PROTHROMBIN TIME: CPT | Mod: OUT | Performed by: INTERNAL MEDICINE

## 2025-01-02 PROCEDURE — 36415 COLL VENOUS BLD VENIPUNCTURE: CPT | Mod: OUT | Performed by: INTERNAL MEDICINE

## 2025-01-06 ENCOUNTER — LAB REQUISITION (OUTPATIENT)
Dept: LAB | Facility: HOSPITAL | Age: 55
End: 2025-01-06
Payer: COMMERCIAL

## 2025-01-06 DIAGNOSIS — Z79.01 LONG TERM (CURRENT) USE OF ANTICOAGULANTS: ICD-10-CM

## 2025-01-06 LAB
INR PPP: 2.5 (ref 0.9–1.1)
PROTHROMBIN TIME: 28.2 SECONDS (ref 9.8–12.8)

## 2025-01-06 PROCEDURE — 85610 PROTHROMBIN TIME: CPT | Mod: OUT | Performed by: INTERNAL MEDICINE

## 2025-01-06 PROCEDURE — 36415 COLL VENOUS BLD VENIPUNCTURE: CPT | Mod: OUT | Performed by: INTERNAL MEDICINE

## 2025-01-09 ENCOUNTER — LAB REQUISITION (OUTPATIENT)
Dept: LAB | Facility: HOSPITAL | Age: 55
End: 2025-01-09
Payer: COMMERCIAL

## 2025-01-09 DIAGNOSIS — Z79.01 LONG TERM (CURRENT) USE OF ANTICOAGULANTS: ICD-10-CM

## 2025-01-09 DIAGNOSIS — N18.6 END STAGE RENAL DISEASE (MULTI): ICD-10-CM

## 2025-01-09 LAB
INR PPP: 3 (ref 0.9–1.1)
PROTHROMBIN TIME: 34.4 SECONDS (ref 9.8–12.8)

## 2025-01-09 PROCEDURE — 85610 PROTHROMBIN TIME: CPT | Mod: OUT | Performed by: INTERNAL MEDICINE

## 2025-01-09 PROCEDURE — 36415 COLL VENOUS BLD VENIPUNCTURE: CPT | Mod: OUT | Performed by: INTERNAL MEDICINE

## 2025-01-13 ENCOUNTER — LAB REQUISITION (OUTPATIENT)
Dept: LAB | Facility: HOSPITAL | Age: 55
End: 2025-01-13
Payer: MEDICARE

## 2025-01-13 DIAGNOSIS — Z79.01 LONG TERM (CURRENT) USE OF ANTICOAGULANTS: ICD-10-CM

## 2025-01-13 LAB
INR PPP: 1.9 (ref 0.9–1.1)
PROTHROMBIN TIME: 22.1 SECONDS (ref 9.8–12.8)

## 2025-01-13 PROCEDURE — 85610 PROTHROMBIN TIME: CPT | Mod: OUT | Performed by: INTERNAL MEDICINE

## 2025-01-13 PROCEDURE — 36415 COLL VENOUS BLD VENIPUNCTURE: CPT | Mod: OUT | Performed by: INTERNAL MEDICINE

## 2025-01-16 ENCOUNTER — LAB REQUISITION (OUTPATIENT)
Dept: LAB | Facility: HOSPITAL | Age: 55
End: 2025-01-16
Payer: MEDICARE

## 2025-01-16 ENCOUNTER — APPOINTMENT (OUTPATIENT)
Dept: DIALYSIS | Facility: HOSPITAL | Age: 55
End: 2025-01-16
Payer: MEDICARE

## 2025-01-16 ENCOUNTER — LAB (OUTPATIENT)
Dept: LAB | Facility: LAB | Age: 55
End: 2025-01-16
Payer: MEDICAID

## 2025-01-16 DIAGNOSIS — E55.9 VITAMIN D DEFICIENCY: ICD-10-CM

## 2025-01-16 DIAGNOSIS — N18.4 ANEMIA DUE TO STAGE 4 CHRONIC KIDNEY DISEASE (MULTI): ICD-10-CM

## 2025-01-16 DIAGNOSIS — D63.1 ANEMIA DUE TO STAGE 4 CHRONIC KIDNEY DISEASE (MULTI): ICD-10-CM

## 2025-01-16 DIAGNOSIS — Z79.01 LONG TERM (CURRENT) USE OF ANTICOAGULANTS: ICD-10-CM

## 2025-01-16 DIAGNOSIS — N18.4 CHRONIC KIDNEY DISEASE, STAGE 4 (SEVERE) (MULTI): ICD-10-CM

## 2025-01-16 DIAGNOSIS — R60.0 LEG EDEMA: ICD-10-CM

## 2025-01-16 LAB
25(OH)D3 SERPL-MCNC: 49 NG/ML (ref 30–100)
ALBUMIN SERPL BCP-MCNC: 4.3 G/DL (ref 3.4–5)
ANION GAP SERPL CALC-SCNC: 16 MMOL/L (ref 10–20)
APPEARANCE UR: ABNORMAL
BACTERIA #/AREA URNS AUTO: ABNORMAL /HPF
BILIRUB UR STRIP.AUTO-MCNC: NEGATIVE MG/DL
BUN SERPL-MCNC: 42 MG/DL (ref 6–23)
CALCIUM SERPL-MCNC: 9.3 MG/DL (ref 8.6–10.3)
CHLORIDE SERPL-SCNC: 102 MMOL/L (ref 98–107)
CO2 SERPL-SCNC: 27 MMOL/L (ref 21–32)
COLOR UR: ABNORMAL
CREAT SERPL-MCNC: 2.04 MG/DL (ref 0.5–1.05)
CREAT UR-MCNC: 44 MG/DL (ref 20–320)
CREAT UR-MCNC: 44 MG/DL (ref 20–320)
EGFRCR SERPLBLD CKD-EPI 2021: 29 ML/MIN/1.73M*2
ERYTHROCYTE [DISTWIDTH] IN BLOOD BY AUTOMATED COUNT: 15.5 % (ref 11.5–14.5)
GLUCOSE SERPL-MCNC: 111 MG/DL (ref 74–99)
GLUCOSE UR STRIP.AUTO-MCNC: NORMAL MG/DL
HCT VFR BLD AUTO: 35.4 % (ref 36–46)
HGB BLD-MCNC: 11 G/DL (ref 12–16)
INR PPP: 2.9 (ref 0.9–1.1)
KETONES UR STRIP.AUTO-MCNC: NEGATIVE MG/DL
LEUKOCYTE ESTERASE UR QL STRIP.AUTO: ABNORMAL
MCH RBC QN AUTO: 30.2 PG (ref 26–34)
MCHC RBC AUTO-ENTMCNC: 31.1 G/DL (ref 32–36)
MCV RBC AUTO: 97 FL (ref 80–100)
MICROALBUMIN UR-MCNC: 33.7 MG/L
MICROALBUMIN/CREAT UR: 76.6 UG/MG CREAT
NITRITE UR QL STRIP.AUTO: NEGATIVE
NRBC BLD-RTO: 0 /100 WBCS (ref 0–0)
PH UR STRIP.AUTO: 5.5 [PH]
PHOSPHATE SERPL-MCNC: 4.4 MG/DL (ref 2.5–4.9)
PLATELET # BLD AUTO: 179 X10*3/UL (ref 150–450)
POTASSIUM SERPL-SCNC: 4.1 MMOL/L (ref 3.5–5.3)
PROT UR STRIP.AUTO-MCNC: NEGATIVE MG/DL
PROT UR-ACNC: 11 MG/DL (ref 5–24)
PROT/CREAT UR: 0.25 MG/MG CREAT (ref 0–0.17)
PROTHROMBIN TIME: 33.5 SECONDS (ref 9.8–12.8)
PTH-INTACT SERPL-MCNC: 63.3 PG/ML (ref 18.5–88)
RBC # BLD AUTO: 3.64 X10*6/UL (ref 4–5.2)
RBC # UR STRIP.AUTO: NEGATIVE /UL
RBC #/AREA URNS AUTO: ABNORMAL /HPF
SODIUM SERPL-SCNC: 141 MMOL/L (ref 136–145)
SP GR UR STRIP.AUTO: 1.01
SQUAMOUS #/AREA URNS AUTO: ABNORMAL /HPF
UROBILINOGEN UR STRIP.AUTO-MCNC: NORMAL MG/DL
WBC # BLD AUTO: 8 X10*3/UL (ref 4.4–11.3)
WBC #/AREA URNS AUTO: >50 /HPF

## 2025-01-16 PROCEDURE — 86255 FLUORESCENT ANTIBODY SCREEN: CPT

## 2025-01-16 PROCEDURE — 80069 RENAL FUNCTION PANEL: CPT

## 2025-01-16 PROCEDURE — 85610 PROTHROMBIN TIME: CPT | Mod: OUT | Performed by: INTERNAL MEDICINE

## 2025-01-16 PROCEDURE — 84156 ASSAY OF PROTEIN URINE: CPT

## 2025-01-16 PROCEDURE — 82570 ASSAY OF URINE CREATININE: CPT

## 2025-01-16 PROCEDURE — 36415 COLL VENOUS BLD VENIPUNCTURE: CPT | Mod: OUT | Performed by: INTERNAL MEDICINE

## 2025-01-16 PROCEDURE — 82306 VITAMIN D 25 HYDROXY: CPT

## 2025-01-16 PROCEDURE — 81001 URINALYSIS AUTO W/SCOPE: CPT

## 2025-01-16 PROCEDURE — 83970 ASSAY OF PARATHORMONE: CPT

## 2025-01-16 PROCEDURE — 85027 COMPLETE CBC AUTOMATED: CPT

## 2025-01-16 PROCEDURE — 82043 UR ALBUMIN QUANTITATIVE: CPT

## 2025-01-16 PROCEDURE — 36415 COLL VENOUS BLD VENIPUNCTURE: CPT

## 2025-01-19 LAB — PLA2R IGG SERPL QL IF: NORMAL

## 2025-01-20 ENCOUNTER — LAB REQUISITION (OUTPATIENT)
Dept: LAB | Facility: HOSPITAL | Age: 55
End: 2025-01-20
Payer: MEDICARE

## 2025-01-20 DIAGNOSIS — Z79.01 LONG TERM (CURRENT) USE OF ANTICOAGULANTS: ICD-10-CM

## 2025-01-20 LAB
INR PPP: 4.1 (ref 0.9–1.1)
PROTHROMBIN TIME: 46.8 SECONDS (ref 9.8–12.8)

## 2025-01-20 PROCEDURE — 36415 COLL VENOUS BLD VENIPUNCTURE: CPT | Mod: OUT | Performed by: INTERNAL MEDICINE

## 2025-01-20 PROCEDURE — 85610 PROTHROMBIN TIME: CPT | Mod: OUT | Performed by: INTERNAL MEDICINE

## 2025-01-21 ENCOUNTER — APPOINTMENT (OUTPATIENT)
Dept: NEPHROLOGY | Facility: CLINIC | Age: 55
End: 2025-01-21
Payer: MEDICARE

## 2025-01-21 VITALS
HEART RATE: 90 BPM | WEIGHT: 191 LBS | SYSTOLIC BLOOD PRESSURE: 131 MMHG | DIASTOLIC BLOOD PRESSURE: 82 MMHG | OXYGEN SATURATION: 93 % | TEMPERATURE: 97.2 F | BODY MASS INDEX: 37.3 KG/M2

## 2025-01-21 DIAGNOSIS — N05.2 MEMBRANOUS GLOMERULONEPHRITIS: ICD-10-CM

## 2025-01-21 DIAGNOSIS — I12.9 HYPERTENSIVE KIDNEY DISEASE WITH STAGE 3B CHRONIC KIDNEY DISEASE (MULTI): Primary | ICD-10-CM

## 2025-01-21 DIAGNOSIS — N18.32 HYPERTENSIVE KIDNEY DISEASE WITH STAGE 3B CHRONIC KIDNEY DISEASE (MULTI): Primary | ICD-10-CM

## 2025-01-21 PROCEDURE — 3060F POS MICROALBUMINURIA REV: CPT | Performed by: INTERNAL MEDICINE

## 2025-01-21 PROCEDURE — 3075F SYST BP GE 130 - 139MM HG: CPT | Performed by: INTERNAL MEDICINE

## 2025-01-21 PROCEDURE — 3079F DIAST BP 80-89 MM HG: CPT | Performed by: INTERNAL MEDICINE

## 2025-01-21 PROCEDURE — 99213 OFFICE O/P EST LOW 20 MIN: CPT | Performed by: INTERNAL MEDICINE

## 2025-01-21 PROCEDURE — 4010F ACE/ARB THERAPY RXD/TAKEN: CPT | Performed by: INTERNAL MEDICINE

## 2025-01-21 NOTE — PROGRESS NOTES
Chief Complaint: Follow up CKD    Notes pain over kidneys continued Left great   Losing weight with ozempic  Denies nausea, vomiting, chest pain, dyspnea  No urinary symptoms  Still living in nursing facility    NAD  Sclera AI s inj  MMM, no sores  Deferred secondary to COVID  No edema; R leg wrapped chronically  No tremor  No rash    CKD stage 3b to 4  BP at goal  Proteinuria much improved  PLA2R negative    Follow up with urology given hydroureter  Labs and follow up in three months

## 2025-01-23 ENCOUNTER — LAB REQUISITION (OUTPATIENT)
Dept: LAB | Facility: HOSPITAL | Age: 55
End: 2025-01-23
Payer: MEDICARE

## 2025-01-23 DIAGNOSIS — Z79.01 LONG TERM (CURRENT) USE OF ANTICOAGULANTS: ICD-10-CM

## 2025-01-23 DIAGNOSIS — N18.9 CHRONIC KIDNEY DISEASE, UNSPECIFIED: ICD-10-CM

## 2025-01-23 DIAGNOSIS — E11.9 TYPE 2 DIABETES MELLITUS WITHOUT COMPLICATIONS (MULTI): ICD-10-CM

## 2025-01-23 LAB
EST. AVERAGE GLUCOSE BLD GHB EST-MCNC: 111 MG/DL
HBA1C MFR BLD: 5.5 %
INR PPP: 1.5 (ref 0.9–1.1)
PROTHROMBIN TIME: 16.8 SECONDS (ref 9.8–12.8)

## 2025-01-23 PROCEDURE — 36415 COLL VENOUS BLD VENIPUNCTURE: CPT | Mod: OUT | Performed by: INTERNAL MEDICINE

## 2025-01-23 PROCEDURE — 83036 HEMOGLOBIN GLYCOSYLATED A1C: CPT | Mod: OUT | Performed by: INTERNAL MEDICINE

## 2025-01-23 PROCEDURE — 85610 PROTHROMBIN TIME: CPT | Mod: OUT | Performed by: INTERNAL MEDICINE

## 2025-01-27 ENCOUNTER — HOSPITAL ENCOUNTER (OUTPATIENT)
Dept: DIALYSIS | Facility: HOSPITAL | Age: 55
Setting detail: DIALYSIS SERIES
Discharge: HOME | End: 2025-01-27
Payer: MEDICARE

## 2025-01-27 VITALS
BODY MASS INDEX: 36.94 KG/M2 | DIASTOLIC BLOOD PRESSURE: 74 MMHG | SYSTOLIC BLOOD PRESSURE: 100 MMHG | HEART RATE: 100 BPM | WEIGHT: 189.15 LBS

## 2025-01-27 DIAGNOSIS — N18.4 ANEMIA DUE TO STAGE 4 CHRONIC KIDNEY DISEASE (MULTI): Primary | ICD-10-CM

## 2025-01-27 DIAGNOSIS — N18.4 CHRONIC KIDNEY DISEASE, STAGE 4 (SEVERE) (MULTI): ICD-10-CM

## 2025-01-27 DIAGNOSIS — D63.1 ANEMIA DUE TO STAGE 4 CHRONIC KIDNEY DISEASE (MULTI): Primary | ICD-10-CM

## 2025-01-27 RX ORDER — ALBUTEROL SULFATE 0.83 MG/ML
3 SOLUTION RESPIRATORY (INHALATION) AS NEEDED
Status: DISCONTINUED | OUTPATIENT
Start: 2025-01-27 | End: 2025-01-28 | Stop reason: HOSPADM

## 2025-01-27 RX ORDER — EPINEPHRINE 0.3 MG/.3ML
0.3 INJECTION SUBCUTANEOUS EVERY 5 MIN PRN
OUTPATIENT
Start: 2025-02-13

## 2025-01-27 RX ORDER — FAMOTIDINE 10 MG/ML
20 INJECTION INTRAVENOUS ONCE AS NEEDED
Status: DISCONTINUED | OUTPATIENT
Start: 2025-01-27 | End: 2025-01-28 | Stop reason: HOSPADM

## 2025-01-27 RX ORDER — ALBUTEROL SULFATE 0.83 MG/ML
3 SOLUTION RESPIRATORY (INHALATION) AS NEEDED
OUTPATIENT
Start: 2025-02-13

## 2025-01-27 RX ORDER — FAMOTIDINE 10 MG/ML
20 INJECTION INTRAVENOUS ONCE AS NEEDED
OUTPATIENT
Start: 2025-02-13

## 2025-01-27 RX ORDER — EPINEPHRINE 0.3 MG/.3ML
0.3 INJECTION SUBCUTANEOUS EVERY 5 MIN PRN
Status: DISCONTINUED | OUTPATIENT
Start: 2025-01-27 | End: 2025-01-28 | Stop reason: HOSPADM

## 2025-01-27 ASSESSMENT — PAIN SCALES - GENERAL: PAINLEVEL_OUTOF10: 0-NO PAIN

## 2025-01-27 NOTE — PROGRESS NOTES
Name: Janene Pinedo   : 1970   MRN: [id number]     Reason for Visit: . (Aranesp 150mcg )      Vitals:    25 1424   BP: 100/74   BP Location: Left arm   Patient Position: Sitting   BP Cuff Size: Adult   Pulse: 100   Weight: 85.8 kg (189 lb 2.5 oz)        How is your energy? Low energy   How is your appetite? Decreased appetite  Any nausea and vomiting after eating? No   Any diarrhea or constipation? no  Any SOB? no  Any Edema in lower extremities? none  Have you had any recent infections? no  Have you been on antibiotics recently? no  Any questions regarding your Kidney disease? no  DO you have a Dialysis accesses? no    Hemoglobin   Date/Time Value Ref Range Status   2025 10:23 AM 11.0 (L) 12.0 - 16.0 g/dL Final   2024 03:21 PM 10.2 (L) 12.0 - 16.0 g/dL Final     Ferritin   Date/Time Value Ref Range Status   2024 01:14  (H) 8 - 150 ng/mL Final     % Saturation   Date/Time Value Ref Range Status   2024 01:14 PM 21 (L) 25 - 45 % Final        Medications Given in Clinic: Aranesp 100mcg;      Nursing notes:  During clinic visit, noticed that patient hgb was 11 on 25, per Dr. Sanders Aranesp dose decreased to 100mcg, Aranesp 100mcg given, tolerated well and discharged home       RN: Destiney Al RN     25

## 2025-02-03 ENCOUNTER — LAB REQUISITION (OUTPATIENT)
Dept: LAB | Facility: HOSPITAL | Age: 55
End: 2025-02-03
Payer: MEDICARE

## 2025-02-03 DIAGNOSIS — Z79.01 LONG TERM (CURRENT) USE OF ANTICOAGULANTS: ICD-10-CM

## 2025-02-03 LAB
INR PPP: 3.2 (ref 0.9–1.1)
PROTHROMBIN TIME: 37 SECONDS (ref 9.8–12.8)

## 2025-02-03 PROCEDURE — 85610 PROTHROMBIN TIME: CPT | Mod: OUT | Performed by: INTERNAL MEDICINE

## 2025-02-03 PROCEDURE — 36415 COLL VENOUS BLD VENIPUNCTURE: CPT | Mod: OUT | Performed by: INTERNAL MEDICINE

## 2025-02-06 ENCOUNTER — LAB REQUISITION (OUTPATIENT)
Dept: LAB | Facility: HOSPITAL | Age: 55
End: 2025-02-06
Payer: MEDICARE

## 2025-02-06 DIAGNOSIS — Z79.01 LONG TERM (CURRENT) USE OF ANTICOAGULANTS: ICD-10-CM

## 2025-02-06 DIAGNOSIS — N18.9 CHRONIC KIDNEY DISEASE, UNSPECIFIED: ICD-10-CM

## 2025-02-06 LAB — APTT PPP: 56 SECONDS (ref 27–38)

## 2025-02-06 PROCEDURE — 36415 COLL VENOUS BLD VENIPUNCTURE: CPT | Mod: OUT | Performed by: INTERNAL MEDICINE

## 2025-02-06 PROCEDURE — 85730 THROMBOPLASTIN TIME PARTIAL: CPT | Mod: OUT | Performed by: INTERNAL MEDICINE

## 2025-02-07 ENCOUNTER — LAB REQUISITION (OUTPATIENT)
Dept: LAB | Facility: HOSPITAL | Age: 55
End: 2025-02-07
Payer: MEDICARE

## 2025-02-07 DIAGNOSIS — Z79.01 LONG TERM (CURRENT) USE OF ANTICOAGULANTS: ICD-10-CM

## 2025-02-07 LAB
INR PPP: 4.7 (ref 0.9–1.1)
PROTHROMBIN TIME: 54.2 SECONDS (ref 9.8–12.8)

## 2025-02-07 PROCEDURE — 85610 PROTHROMBIN TIME: CPT | Mod: OUT | Performed by: INTERNAL MEDICINE

## 2025-02-07 PROCEDURE — 85610 PROTHROMBIN TIME: CPT

## 2025-02-07 PROCEDURE — 36415 COLL VENOUS BLD VENIPUNCTURE: CPT | Mod: OUT | Performed by: INTERNAL MEDICINE

## 2025-02-08 ENCOUNTER — LAB REQUISITION (OUTPATIENT)
Dept: LAB | Facility: HOSPITAL | Age: 55
End: 2025-02-08
Payer: MEDICARE

## 2025-02-08 DIAGNOSIS — Z79.01 LONG TERM (CURRENT) USE OF ANTICOAGULANTS: ICD-10-CM

## 2025-02-08 DIAGNOSIS — N18.9 CHRONIC KIDNEY DISEASE, UNSPECIFIED: ICD-10-CM

## 2025-02-08 DIAGNOSIS — E11.9 TYPE 2 DIABETES MELLITUS WITHOUT COMPLICATIONS: ICD-10-CM

## 2025-02-08 LAB
INR PPP: 5 (ref 0.9–1.1)
PROTHROMBIN TIME: 57.7 SECONDS (ref 9.8–12.8)

## 2025-02-10 ENCOUNTER — LAB REQUISITION (OUTPATIENT)
Dept: LAB | Facility: HOSPITAL | Age: 55
End: 2025-02-10
Payer: MEDICARE

## 2025-02-10 DIAGNOSIS — Z79.01 LONG TERM (CURRENT) USE OF ANTICOAGULANTS: ICD-10-CM

## 2025-02-10 DIAGNOSIS — N18.9 CHRONIC KIDNEY DISEASE, UNSPECIFIED: ICD-10-CM

## 2025-02-10 LAB
INR PPP: 2.4 (ref 0.9–1.1)
PROTHROMBIN TIME: 26.8 SECONDS (ref 9.8–12.8)

## 2025-02-10 PROCEDURE — 36415 COLL VENOUS BLD VENIPUNCTURE: CPT | Mod: OUT | Performed by: INTERNAL MEDICINE

## 2025-02-10 PROCEDURE — 85610 PROTHROMBIN TIME: CPT | Mod: OUT | Performed by: INTERNAL MEDICINE

## 2025-02-12 ENCOUNTER — APPOINTMENT (OUTPATIENT)
Dept: DIALYSIS | Facility: HOSPITAL | Age: 55
End: 2025-02-12
Payer: MEDICARE

## 2025-02-13 ENCOUNTER — LAB REQUISITION (OUTPATIENT)
Dept: LAB | Facility: HOSPITAL | Age: 55
End: 2025-02-13
Payer: MEDICARE

## 2025-02-13 DIAGNOSIS — Z79.01 LONG TERM (CURRENT) USE OF ANTICOAGULANTS: ICD-10-CM

## 2025-02-13 DIAGNOSIS — N18.9 CHRONIC KIDNEY DISEASE, UNSPECIFIED: ICD-10-CM

## 2025-02-13 LAB
INR PPP: 1.8 (ref 0.9–1.1)
PROTHROMBIN TIME: 19.9 SECONDS (ref 9.8–12.8)

## 2025-02-13 PROCEDURE — 85610 PROTHROMBIN TIME: CPT | Mod: OUT | Performed by: INTERNAL MEDICINE

## 2025-02-13 PROCEDURE — 36415 COLL VENOUS BLD VENIPUNCTURE: CPT | Mod: OUT | Performed by: INTERNAL MEDICINE

## 2025-02-17 ENCOUNTER — LAB REQUISITION (OUTPATIENT)
Dept: LAB | Facility: HOSPITAL | Age: 55
End: 2025-02-17
Payer: MEDICARE

## 2025-02-17 DIAGNOSIS — Z79.01 LONG TERM (CURRENT) USE OF ANTICOAGULANTS: ICD-10-CM

## 2025-02-17 LAB
INR PPP: 3.2 (ref 0.9–1.1)
PROTHROMBIN TIME: 36.1 SECONDS (ref 9.8–12.8)

## 2025-02-17 PROCEDURE — 36415 COLL VENOUS BLD VENIPUNCTURE: CPT | Mod: OUT | Performed by: INTERNAL MEDICINE

## 2025-02-17 PROCEDURE — 85610 PROTHROMBIN TIME: CPT | Mod: OUT | Performed by: INTERNAL MEDICINE

## 2025-02-20 ENCOUNTER — LAB REQUISITION (OUTPATIENT)
Dept: LAB | Facility: HOSPITAL | Age: 55
End: 2025-02-20
Payer: MEDICARE

## 2025-02-20 DIAGNOSIS — N18.9 CHRONIC KIDNEY DISEASE, UNSPECIFIED: ICD-10-CM

## 2025-02-20 DIAGNOSIS — Z79.01 LONG TERM (CURRENT) USE OF ANTICOAGULANTS: ICD-10-CM

## 2025-02-20 LAB
INR PPP: 2.3 (ref 0.9–1.1)
PROTHROMBIN TIME: 26 SECONDS (ref 9.8–12.8)

## 2025-02-20 PROCEDURE — 36415 COLL VENOUS BLD VENIPUNCTURE: CPT | Mod: OUT | Performed by: INTERNAL MEDICINE

## 2025-02-20 PROCEDURE — 85610 PROTHROMBIN TIME: CPT | Mod: OUT | Performed by: INTERNAL MEDICINE

## 2025-02-24 ENCOUNTER — LAB REQUISITION (OUTPATIENT)
Dept: LAB | Facility: HOSPITAL | Age: 55
End: 2025-02-24
Payer: MEDICARE

## 2025-02-24 ENCOUNTER — HOSPITAL ENCOUNTER (OUTPATIENT)
Dept: DIALYSIS | Facility: HOSPITAL | Age: 55
Setting detail: DIALYSIS SERIES
Discharge: HOME | End: 2025-02-24
Payer: MEDICARE

## 2025-02-24 VITALS
DIASTOLIC BLOOD PRESSURE: 71 MMHG | TEMPERATURE: 96.1 F | WEIGHT: 188.27 LBS | HEART RATE: 93 BPM | SYSTOLIC BLOOD PRESSURE: 107 MMHG | BODY MASS INDEX: 36.77 KG/M2

## 2025-02-24 DIAGNOSIS — N18.9 CHRONIC KIDNEY DISEASE, UNSPECIFIED: ICD-10-CM

## 2025-02-24 DIAGNOSIS — N18.4 ANEMIA DUE TO STAGE 4 CHRONIC KIDNEY DISEASE (MULTI): Primary | ICD-10-CM

## 2025-02-24 DIAGNOSIS — N18.4 CHRONIC KIDNEY DISEASE, STAGE 4 (SEVERE) (MULTI): ICD-10-CM

## 2025-02-24 DIAGNOSIS — D63.1 ANEMIA DUE TO STAGE 4 CHRONIC KIDNEY DISEASE (MULTI): Primary | ICD-10-CM

## 2025-02-24 LAB
ALBUMIN SERPL BCP-MCNC: 3.7 G/DL (ref 3.4–5)
ANION GAP SERPL CALC-SCNC: 13 MMOL/L (ref 10–20)
BUN SERPL-MCNC: 35 MG/DL (ref 6–23)
CALCIUM SERPL-MCNC: 8.6 MG/DL (ref 8.6–10.6)
CHLORIDE SERPL-SCNC: 105 MMOL/L (ref 98–107)
CO2 SERPL-SCNC: 25 MMOL/L (ref 21–32)
CREAT SERPL-MCNC: 1.76 MG/DL (ref 0.5–1.05)
EGFRCR SERPLBLD CKD-EPI 2021: 34 ML/MIN/1.73M*2
ERYTHROCYTE [DISTWIDTH] IN BLOOD BY AUTOMATED COUNT: 15.4 % (ref 11.5–14.5)
GLUCOSE SERPL-MCNC: 121 MG/DL (ref 74–99)
HCT VFR BLD AUTO: 31 % (ref 36–46)
HGB BLD-MCNC: 10.2 G/DL (ref 12–16)
INR PPP: 3 (ref 0.9–1.1)
MCH RBC QN AUTO: 31 PG (ref 26–34)
MCHC RBC AUTO-ENTMCNC: 32.9 G/DL (ref 32–36)
MCV RBC AUTO: 94 FL (ref 80–100)
NRBC BLD-RTO: 0 /100 WBCS (ref 0–0)
PHOSPHATE SERPL-MCNC: 3.7 MG/DL (ref 2.5–4.9)
PLATELET # BLD AUTO: 194 X10*3/UL (ref 150–450)
POTASSIUM SERPL-SCNC: 4.1 MMOL/L (ref 3.5–5.3)
PROTHROMBIN TIME: 34.3 SECONDS (ref 9.8–12.8)
RBC # BLD AUTO: 3.29 X10*6/UL (ref 4–5.2)
SODIUM SERPL-SCNC: 139 MMOL/L (ref 136–145)
WBC # BLD AUTO: 7.9 X10*3/UL (ref 4.4–11.3)

## 2025-02-24 PROCEDURE — 36415 COLL VENOUS BLD VENIPUNCTURE: CPT

## 2025-02-24 PROCEDURE — 85610 PROTHROMBIN TIME: CPT | Mod: OUT | Performed by: INTERNAL MEDICINE

## 2025-02-24 PROCEDURE — 96372 THER/PROPH/DIAG INJ SC/IM: CPT

## 2025-02-24 PROCEDURE — 85027 COMPLETE CBC AUTOMATED: CPT | Performed by: INTERNAL MEDICINE

## 2025-02-24 PROCEDURE — 36415 COLL VENOUS BLD VENIPUNCTURE: CPT | Performed by: INTERNAL MEDICINE

## 2025-02-24 PROCEDURE — 2500000004 HC RX 250 GENERAL PHARMACY W/ HCPCS (ALT 636 FOR OP/ED): Mod: JZ,TB

## 2025-02-24 PROCEDURE — 80069 RENAL FUNCTION PANEL: CPT | Performed by: INTERNAL MEDICINE

## 2025-02-24 PROCEDURE — 36415 COLL VENOUS BLD VENIPUNCTURE: CPT | Mod: OUT | Performed by: INTERNAL MEDICINE

## 2025-02-24 PROCEDURE — 96374 THER/PROPH/DIAG INJ IV PUSH: CPT

## 2025-02-24 RX ORDER — ALBUTEROL SULFATE 0.83 MG/ML
3 SOLUTION RESPIRATORY (INHALATION) AS NEEDED
OUTPATIENT
Start: 2025-03-24

## 2025-02-24 RX ORDER — FAMOTIDINE 10 MG/ML
20 INJECTION, SOLUTION INTRAVENOUS ONCE AS NEEDED
Status: DISCONTINUED | OUTPATIENT
Start: 2025-02-24 | End: 2025-02-25 | Stop reason: HOSPADM

## 2025-02-24 RX ORDER — FAMOTIDINE 10 MG/ML
20 INJECTION, SOLUTION INTRAVENOUS ONCE AS NEEDED
OUTPATIENT
Start: 2025-03-24

## 2025-02-24 RX ORDER — ALBUTEROL SULFATE 0.83 MG/ML
3 SOLUTION RESPIRATORY (INHALATION) AS NEEDED
Status: DISCONTINUED | OUTPATIENT
Start: 2025-02-24 | End: 2025-02-25 | Stop reason: HOSPADM

## 2025-02-24 RX ORDER — EPINEPHRINE 0.3 MG/.3ML
0.3 INJECTION SUBCUTANEOUS EVERY 5 MIN PRN
OUTPATIENT
Start: 2025-03-24

## 2025-02-24 RX ORDER — EPINEPHRINE 0.3 MG/.3ML
0.3 INJECTION SUBCUTANEOUS EVERY 5 MIN PRN
Status: DISCONTINUED | OUTPATIENT
Start: 2025-02-24 | End: 2025-02-25 | Stop reason: HOSPADM

## 2025-02-24 RX ADMIN — DARBEPOETIN ALFA 100 MCG: 100 INJECTION, SOLUTION INTRAVENOUS; SUBCUTANEOUS at 10:12

## 2025-02-24 ASSESSMENT — PAIN SCALES - GENERAL: PAINLEVEL_OUTOF10: 0-NO PAIN

## 2025-02-24 NOTE — PROGRESS NOTES
Name: Janene Pinedo  MRN: [id number]  : 1970      Reason for Visit: Injections (Aranesp 100 mcg)      Vitals:    25 0953   BP: 107/71   BP Location: Right arm   Patient Position: Sitting   BP Cuff Size: Adult   Pulse: 93   Temp: 35.6 °C (96.1 °F)   TempSrc: Tympanic   Weight: 85.4 kg (188 lb 4.4 oz)        How is your energy? States energy is fair, about the same as last visit.   How is your appetite? Appetite has improved.   Any nausea and vomiting after eating? No  Any diarrhea or constipation? No  Any SOB? No  Any Edema in lower extremities? No  Have you had any recent infections? No  Have you been on antibiotics recently? No  Any questions regarding your Kidney disease? No  DO you have a Dialysis accesses? No, monitoring as of now. Stage 5 CKD.     Hemoglobin   Date/Time Value Ref Range Status   2025 05:00 AM 9.7 (L) 12.0 - 16.0 g/dL Final   2025 10:23 AM 11.0 (L) 12.0 - 16.0 g/dL Final     Ferritin   Date/Time Value Ref Range Status   2024 01:14  (H) 8 - 150 ng/mL Final     % Saturation   Date/Time Value Ref Range Status   2024 01:14 PM 21 (L) 25 - 45 % Final        Medications Given in Clinic: Aranesp 100mcg;      Nursing notes:  Patient assessment completed, and vital signs obtained. CBC and Renal Function Panel obtained, and patient given Aranesp injection 100mcg. Tolerated well. Patient left clinic on her own accord, no issues or concerns noted.       RN: MARY SMITH RN     25  9:58 AM

## 2025-02-27 ENCOUNTER — LAB REQUISITION (OUTPATIENT)
Dept: LAB | Facility: HOSPITAL | Age: 55
End: 2025-02-27
Payer: MEDICARE

## 2025-02-27 DIAGNOSIS — N18.5 CHRONIC KIDNEY DISEASE, STAGE 5 (MULTI): ICD-10-CM

## 2025-02-27 DIAGNOSIS — I12.0 HYPERTENSIVE CHRONIC KIDNEY DISEASE WITH STAGE 5 CHRONIC KIDNEY DISEASE OR END STAGE RENAL DISEASE: ICD-10-CM

## 2025-02-27 DIAGNOSIS — D64.9 ANEMIA, UNSPECIFIED: ICD-10-CM

## 2025-02-27 LAB
INR PPP: 3.1 (ref 0.9–1.1)
PROTHROMBIN TIME: 34.4 SECONDS (ref 9.8–12.4)

## 2025-02-27 PROCEDURE — 85610 PROTHROMBIN TIME: CPT | Mod: OUT | Performed by: INTERNAL MEDICINE

## 2025-02-27 PROCEDURE — 36415 COLL VENOUS BLD VENIPUNCTURE: CPT | Mod: OUT | Performed by: INTERNAL MEDICINE

## 2025-03-03 ENCOUNTER — LAB REQUISITION (OUTPATIENT)
Dept: LAB | Facility: HOSPITAL | Age: 55
End: 2025-03-03
Payer: MEDICARE

## 2025-03-03 DIAGNOSIS — Z79.01 LONG TERM (CURRENT) USE OF ANTICOAGULANTS: ICD-10-CM

## 2025-03-03 LAB
INR PPP: 2.2 (ref 0.9–1.1)
PROTHROMBIN TIME: 24.4 SECONDS (ref 9.8–12.4)

## 2025-03-03 PROCEDURE — 36415 COLL VENOUS BLD VENIPUNCTURE: CPT | Mod: OUT | Performed by: INTERNAL MEDICINE

## 2025-03-03 PROCEDURE — 85610 PROTHROMBIN TIME: CPT | Mod: OUT | Performed by: INTERNAL MEDICINE

## 2025-03-06 ENCOUNTER — LAB REQUISITION (OUTPATIENT)
Dept: LAB | Facility: HOSPITAL | Age: 55
End: 2025-03-06
Payer: MEDICARE

## 2025-03-06 DIAGNOSIS — Z79.01 LONG TERM (CURRENT) USE OF ANTICOAGULANTS: ICD-10-CM

## 2025-03-06 DIAGNOSIS — N18.9 CHRONIC KIDNEY DISEASE, UNSPECIFIED: ICD-10-CM

## 2025-03-06 LAB
INR PPP: 1.7 (ref 0.9–1.1)
PROTHROMBIN TIME: 18.3 SECONDS (ref 9.8–12.4)

## 2025-03-06 PROCEDURE — 85610 PROTHROMBIN TIME: CPT | Mod: OUT | Performed by: INTERNAL MEDICINE

## 2025-03-06 PROCEDURE — 36416 COLLJ CAPILLARY BLOOD SPEC: CPT | Mod: OUT | Performed by: INTERNAL MEDICINE

## 2025-03-10 ENCOUNTER — LAB REQUISITION (OUTPATIENT)
Dept: LAB | Facility: HOSPITAL | Age: 55
End: 2025-03-10
Payer: MEDICARE

## 2025-03-10 DIAGNOSIS — N18.9 CHRONIC KIDNEY DISEASE, UNSPECIFIED: ICD-10-CM

## 2025-03-10 LAB
INR PPP: 2.2 (ref 0.9–1.1)
PROTHROMBIN TIME: 24.3 SECONDS (ref 9.8–12.4)

## 2025-03-10 PROCEDURE — 36416 COLLJ CAPILLARY BLOOD SPEC: CPT | Mod: OUT | Performed by: INTERNAL MEDICINE

## 2025-03-10 PROCEDURE — 85610 PROTHROMBIN TIME: CPT | Mod: OUT | Performed by: INTERNAL MEDICINE

## 2025-03-13 ENCOUNTER — LAB REQUISITION (OUTPATIENT)
Dept: LAB | Facility: HOSPITAL | Age: 55
End: 2025-03-13
Payer: MEDICARE

## 2025-03-13 DIAGNOSIS — Z79.01 LONG TERM (CURRENT) USE OF ANTICOAGULANTS: ICD-10-CM

## 2025-03-13 LAB
INR PPP: 2 (ref 0.9–1.1)
PROTHROMBIN TIME: 22.3 SECONDS (ref 9.8–12.4)

## 2025-03-13 PROCEDURE — 36415 COLL VENOUS BLD VENIPUNCTURE: CPT | Mod: OUT | Performed by: INTERNAL MEDICINE

## 2025-03-13 PROCEDURE — 85610 PROTHROMBIN TIME: CPT | Mod: OUT | Performed by: INTERNAL MEDICINE

## 2025-03-17 ENCOUNTER — LAB REQUISITION (OUTPATIENT)
Dept: LAB | Facility: HOSPITAL | Age: 55
End: 2025-03-17
Payer: MEDICARE

## 2025-03-17 DIAGNOSIS — Z79.01 LONG TERM (CURRENT) USE OF ANTICOAGULANTS: ICD-10-CM

## 2025-03-17 LAB
INR PPP: 2.5 (ref 0.9–1.1)
PROTHROMBIN TIME: 27.4 SECONDS (ref 9.8–12.4)

## 2025-03-17 PROCEDURE — 85610 PROTHROMBIN TIME: CPT | Mod: OUT | Performed by: INTERNAL MEDICINE

## 2025-03-17 PROCEDURE — 36416 COLLJ CAPILLARY BLOOD SPEC: CPT | Mod: OUT | Performed by: INTERNAL MEDICINE

## 2025-03-20 ENCOUNTER — LAB REQUISITION (OUTPATIENT)
Dept: LAB | Facility: HOSPITAL | Age: 55
End: 2025-03-20
Payer: MEDICARE

## 2025-03-20 DIAGNOSIS — Z79.01 LONG TERM (CURRENT) USE OF ANTICOAGULANTS: ICD-10-CM

## 2025-03-20 LAB
INR PPP: 2.2 (ref 0.9–1.1)
PROTHROMBIN TIME: 24.7 SECONDS (ref 9.8–12.4)

## 2025-03-20 PROCEDURE — 36416 COLLJ CAPILLARY BLOOD SPEC: CPT | Mod: OUT | Performed by: INTERNAL MEDICINE

## 2025-03-20 PROCEDURE — 85610 PROTHROMBIN TIME: CPT | Mod: OUT | Performed by: INTERNAL MEDICINE

## 2025-03-24 ENCOUNTER — LAB REQUISITION (OUTPATIENT)
Dept: LAB | Facility: HOSPITAL | Age: 55
End: 2025-03-24
Payer: MEDICARE

## 2025-03-24 DIAGNOSIS — N18.9 CHRONIC KIDNEY DISEASE, UNSPECIFIED: ICD-10-CM

## 2025-03-24 DIAGNOSIS — Z79.01 LONG TERM (CURRENT) USE OF ANTICOAGULANTS: ICD-10-CM

## 2025-03-24 LAB
INR PPP: 2.2 (ref 0.9–1.1)
PROTHROMBIN TIME: 24.7 SECONDS (ref 9.8–12.4)

## 2025-03-24 PROCEDURE — 85610 PROTHROMBIN TIME: CPT | Mod: OUT | Performed by: INTERNAL MEDICINE

## 2025-03-24 PROCEDURE — 36416 COLLJ CAPILLARY BLOOD SPEC: CPT | Mod: OUT | Performed by: INTERNAL MEDICINE

## 2025-03-26 ENCOUNTER — HOSPITAL ENCOUNTER (OUTPATIENT)
Dept: DIALYSIS | Facility: HOSPITAL | Age: 55
Setting detail: DIALYSIS SERIES
Discharge: HOME | End: 2025-03-26
Payer: MEDICARE

## 2025-03-26 VITALS
DIASTOLIC BLOOD PRESSURE: 73 MMHG | TEMPERATURE: 95.7 F | SYSTOLIC BLOOD PRESSURE: 122 MMHG | WEIGHT: 187.61 LBS | HEART RATE: 108 BPM | BODY MASS INDEX: 36.64 KG/M2

## 2025-03-26 DIAGNOSIS — N18.4 CHRONIC KIDNEY DISEASE, STAGE 4 (SEVERE) (MULTI): ICD-10-CM

## 2025-03-26 DIAGNOSIS — D63.1 ANEMIA DUE TO STAGE 4 CHRONIC KIDNEY DISEASE: Primary | ICD-10-CM

## 2025-03-26 DIAGNOSIS — N18.4 ANEMIA DUE TO STAGE 4 CHRONIC KIDNEY DISEASE: Primary | ICD-10-CM

## 2025-03-26 LAB
ALBUMIN SERPL BCP-MCNC: 4 G/DL (ref 3.4–5)
ANION GAP SERPL CALC-SCNC: 17 MMOL/L (ref 10–20)
BUN SERPL-MCNC: 34 MG/DL (ref 6–23)
CALCIUM SERPL-MCNC: 8.9 MG/DL (ref 8.6–10.6)
CHLORIDE SERPL-SCNC: 104 MMOL/L (ref 98–107)
CO2 SERPL-SCNC: 22 MMOL/L (ref 21–32)
CREAT SERPL-MCNC: 1.8 MG/DL (ref 0.5–1.05)
EGFRCR SERPLBLD CKD-EPI 2021: 33 ML/MIN/1.73M*2
ERYTHROCYTE [DISTWIDTH] IN BLOOD BY AUTOMATED COUNT: 15.1 % (ref 11.5–14.5)
GLUCOSE SERPL-MCNC: 108 MG/DL (ref 74–99)
HCT VFR BLD AUTO: 31 % (ref 36–46)
HGB BLD-MCNC: 9.5 G/DL (ref 12–16)
MCH RBC QN AUTO: 29.6 PG (ref 26–34)
MCHC RBC AUTO-ENTMCNC: 30.6 G/DL (ref 32–36)
MCV RBC AUTO: 97 FL (ref 80–100)
NRBC BLD-RTO: 0 /100 WBCS (ref 0–0)
PHOSPHATE SERPL-MCNC: 3.3 MG/DL (ref 2.5–4.9)
PLATELET # BLD AUTO: 174 X10*3/UL (ref 150–450)
POTASSIUM SERPL-SCNC: 4.1 MMOL/L (ref 3.5–5.3)
RBC # BLD AUTO: 3.21 X10*6/UL (ref 4–5.2)
SODIUM SERPL-SCNC: 139 MMOL/L (ref 136–145)
WBC # BLD AUTO: 7.7 X10*3/UL (ref 4.4–11.3)

## 2025-03-26 PROCEDURE — 80069 RENAL FUNCTION PANEL: CPT | Performed by: INTERNAL MEDICINE

## 2025-03-26 PROCEDURE — 36415 COLL VENOUS BLD VENIPUNCTURE: CPT | Performed by: INTERNAL MEDICINE

## 2025-03-26 PROCEDURE — 96372 THER/PROPH/DIAG INJ SC/IM: CPT

## 2025-03-26 PROCEDURE — 2500000004 HC RX 250 GENERAL PHARMACY W/ HCPCS (ALT 636 FOR OP/ED): Mod: JZ,TB

## 2025-03-26 PROCEDURE — 85027 COMPLETE CBC AUTOMATED: CPT | Performed by: INTERNAL MEDICINE

## 2025-03-26 RX ORDER — ALBUTEROL SULFATE 0.83 MG/ML
3 SOLUTION RESPIRATORY (INHALATION) AS NEEDED
OUTPATIENT
Start: 2025-04-21

## 2025-03-26 RX ORDER — FAMOTIDINE 10 MG/ML
20 INJECTION, SOLUTION INTRAVENOUS ONCE AS NEEDED
OUTPATIENT
Start: 2025-04-21

## 2025-03-26 RX ORDER — EPINEPHRINE 0.3 MG/.3ML
0.3 INJECTION SUBCUTANEOUS EVERY 5 MIN PRN
OUTPATIENT
Start: 2025-04-21

## 2025-03-26 RX ADMIN — DARBEPOETIN ALFA 100 MCG: 100 INJECTION, SOLUTION INTRAVENOUS; SUBCUTANEOUS at 11:07

## 2025-03-26 ASSESSMENT — PAIN SCALES - GENERAL: PAINLEVEL_OUTOF10: 0-NO PAIN

## 2025-03-26 NOTE — PROGRESS NOTES
Name: Janene Pinedo   : 1970   MRN: [id number]     Reason for Visit: . (Aranesp 100mcg )      Vitals:    25 1055   BP: 122/73   BP Location: Left arm   Patient Position: Sitting   BP Cuff Size: Adult   Pulse: 108   Temp: 35.4 °C (95.7 °F)   TempSrc: Tympanic   Weight: 85.1 kg (187 lb 9.8 oz)        How is your energy? fair  How is your appetite? Really good  Any nausea and vomiting after eating? no  Any diarrhea or constipation? no  Any SOB? no  Any Edema in lower extremities? no  Have you had any recent infections? Yes, Covid + end of last month, no medications, only breathing treatments   Have you been on antibiotics recently? no  Any questions regarding your Kidney disease? no  DO you have a Dialysis accesses? no    Hemoglobin   Date/Time Value Ref Range Status   2025 10:13 AM 10.2 (L) 12.0 - 16.0 g/dL Final   2025 05:00 AM 9.7 (L) 12.0 - 16.0 g/dL Final     Ferritin   Date/Time Value Ref Range Status   2024 01:14  (H) 8 - 150 ng/mL Final     % Saturation   Date/Time Value Ref Range Status   2024 01:14 PM 21 (L) 25 - 45 % Final        Medications Given in Clinic: Aranesp 100mcg ;      Nursing notes:  CBC and RFP drawn, Aranesp 100mcg given, tolerated well       RN: Destiney Al RN     25

## 2025-03-27 ENCOUNTER — LAB REQUISITION (OUTPATIENT)
Dept: LAB | Facility: HOSPITAL | Age: 55
End: 2025-03-27
Payer: MEDICARE

## 2025-03-27 DIAGNOSIS — Z79.01 LONG TERM (CURRENT) USE OF ANTICOAGULANTS: ICD-10-CM

## 2025-03-27 LAB
INR PPP: 2.2 (ref 0.9–1.1)
PROTHROMBIN TIME: 24.3 SECONDS (ref 9.8–12.4)

## 2025-03-27 PROCEDURE — 85610 PROTHROMBIN TIME: CPT | Mod: OUT | Performed by: INTERNAL MEDICINE

## 2025-03-27 PROCEDURE — 36415 COLL VENOUS BLD VENIPUNCTURE: CPT | Mod: OUT | Performed by: INTERNAL MEDICINE

## 2025-03-31 ENCOUNTER — LAB REQUISITION (OUTPATIENT)
Dept: LAB | Facility: HOSPITAL | Age: 55
End: 2025-03-31
Payer: MEDICARE

## 2025-03-31 ENCOUNTER — APPOINTMENT (OUTPATIENT)
Dept: UROLOGY | Facility: CLINIC | Age: 55
End: 2025-03-31
Payer: MEDICARE

## 2025-03-31 VITALS — TEMPERATURE: 97.2 F

## 2025-03-31 DIAGNOSIS — N26.1 RENAL ATROPHY, LEFT: ICD-10-CM

## 2025-03-31 DIAGNOSIS — N13.39 OTHER HYDRONEPHROSIS: Primary | ICD-10-CM

## 2025-03-31 DIAGNOSIS — R82.90 ABNORMAL URINALYSIS: ICD-10-CM

## 2025-03-31 DIAGNOSIS — Z79.01 LONG TERM (CURRENT) USE OF ANTICOAGULANTS: ICD-10-CM

## 2025-03-31 LAB
INR PPP: 3.4 (ref 0.9–1.1)
POC APPEARANCE, URINE: ABNORMAL
POC BILIRUBIN, URINE: NEGATIVE
POC BLOOD, URINE: ABNORMAL
POC COLOR, URINE: ABNORMAL
POC GLUCOSE, URINE: NEGATIVE MG/DL
POC KETONES, URINE: NEGATIVE MG/DL
POC LEUKOCYTES, URINE: ABNORMAL
POC NITRITE,URINE: NEGATIVE
POC PH, URINE: 6.5 PH
POC PROTEIN, URINE: NEGATIVE MG/DL
POC SPECIFIC GRAVITY, URINE: 1.01
POC UROBILINOGEN, URINE: 0.2 EU/DL
PROTHROMBIN TIME: 37.2 SECONDS (ref 9.8–12.4)

## 2025-03-31 PROCEDURE — 36415 COLL VENOUS BLD VENIPUNCTURE: CPT | Mod: OUT | Performed by: INTERNAL MEDICINE

## 2025-03-31 PROCEDURE — 3060F POS MICROALBUMINURIA REV: CPT | Performed by: STUDENT IN AN ORGANIZED HEALTH CARE EDUCATION/TRAINING PROGRAM

## 2025-03-31 PROCEDURE — 4010F ACE/ARB THERAPY RXD/TAKEN: CPT | Performed by: STUDENT IN AN ORGANIZED HEALTH CARE EDUCATION/TRAINING PROGRAM

## 2025-03-31 PROCEDURE — 85610 PROTHROMBIN TIME: CPT | Mod: OUT | Performed by: INTERNAL MEDICINE

## 2025-03-31 PROCEDURE — 3044F HG A1C LEVEL LT 7.0%: CPT | Performed by: STUDENT IN AN ORGANIZED HEALTH CARE EDUCATION/TRAINING PROGRAM

## 2025-03-31 PROCEDURE — 99214 OFFICE O/P EST MOD 30 MIN: CPT | Performed by: STUDENT IN AN ORGANIZED HEALTH CARE EDUCATION/TRAINING PROGRAM

## 2025-03-31 PROCEDURE — 81003 URINALYSIS AUTO W/O SCOPE: CPT | Performed by: STUDENT IN AN ORGANIZED HEALTH CARE EDUCATION/TRAINING PROGRAM

## 2025-03-31 PROCEDURE — G2211 COMPLEX E/M VISIT ADD ON: HCPCS | Performed by: STUDENT IN AN ORGANIZED HEALTH CARE EDUCATION/TRAINING PROGRAM

## 2025-03-31 ASSESSMENT — PAIN SCALES - GENERAL: PAINLEVEL_OUTOF10: 6

## 2025-03-31 NOTE — PROGRESS NOTES
Scribed for Dr. Ian Avalos by Abdulkadir Drummond. I, Dr. Ian Avalos have personally reviewed and agreed with the information entered by the Virtual Scribe. 03/31/25.    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 Renal ultrasound (12/20/24) showed stable chronic findings. Renal function considered poor but has remained stable (as of 03/26/25); GFR 33; Cr 1.80.    She c/o persistent L flank pain intermittently.   Elects to obtain a NM lasix scan to assess functionality and r/o obstruction.   RTC in 3-5 weeks to review results.     TO REVIEW:  Reports some persistent L sided discomfort.   Occasionally radiates to her tailbone.   Occurs daily, typically whenever she sneezes.  Attributes to her chronic renal/spinal/back/arthritic issues potentially.    Discussed non-surgical vs surgical options including pyeloplasty.   However given potential risks of surgery; considered risks likely outweigh potential benefit. She elected to continue surveillance for now.   Offered pain management referral; patient declined.  Noting she had followed up with pain management  previously.  Was treated with steroids, which did not improve her pain therefore declined the referral.    #Abnormal urinalysis  IO urinalysis showed cloudy urine, trace blood, and large leuks.   Send for culture, call if abnormal.   Treat based on C+S.     All questions were answered to the patient’s satisfaction.  Patient agrees with the plan and wishes to proceed.  Continue follow-up for ongoing care of her chronic medical conditions.       History of Present Illness (HPI):  Janene presents for an annual follow up visit.  The patient’s EMR has been reviewed.  Lives in [place], [place], resides at a SNF.   Occupation: Disabled    Hx of left renal vein thrombosis with renal infarction (07/2021); and PE (09/2021), CKD, membranous glomerulonephritis (followed by Dr. Sanders). Developed BL hydronephrosis following her renal infarction with chronic L flank discomfort. Her hydronephrosis has since resolved, but with persistent L flank pain intermittently.  Pain has been well-controlled on toradol PRN at night. Continues follow up with Nephrology for her CKD.     TODAY: (03/31/25)  Presents for annual follow up and ultrasound results.   Reports she has been doing well overall.   Continues to have intermittent L flank discomfort.   Albeit, has been stable over the past year.   Not bothersome enough to warrant surgery.   Denies any UTI's or gross hematuria in the interim.     Labs and imaging reviewed:  IO urinalysis showed cloudy urine, trace blood, and large leuks.   Renal function considered poor but stable (as of 03/26/25); GFR 33; Cr 1.80.    Renal ultrasound (12/20/24) personally reviewed and independently interpreted.  Atypical mild left hydronephrosis  Left kidney appears small in size.  No right sided hydronephrosis, no stones.     TO REVIEW: Last visit (06/03/24)  Reports she has been doing well.   Albeit, still experiencing L flank pain.   Reports daily occurrences; typically whenever she sneezes.  Attributes to  potentially being 2/2 her chronic spinal/back/arthritic issues.   Denies any recent infections, gross hematuria, fevers or chills.   Latest CT results reviewed with patient.     CT A&P (10/19/23) personally reviewed.   LEFT kidney again shows coarse calcifications or vascular coils with some atrophy. Considered stable, no change from the prior study.   RIGHT kidney remains normal.  Urinary bladder is unremarkable.     TO REVIEW: (06/05/23)  Patient presents for follow up and CT results.   Patient reports persistent L sided flank discomfort that radiates to her tailbone.   Otherwise denies any recent UTIs, dysuria, gross hematuria, nausea, vomiting, fevers or chills.    CT A&P wo contrast (05/25/23) personally reviewed and demonstrated:  Stable postoperative changes of her LEFT kidney;  With diffuse atrophy and multifocal cortical scarring.   LEFT hydronephrosis now resolved. No stones.  Interval resolution of previously noted LLQ abscess (since 11/14/22) with linear residual scarring along the LL mesentery.     CT findings shared with patient today and was reassured that the hydronephrosis has resolved and her kidneys do not appear obstructed.      TO REVIEW: Initial visit  Hx of of developing a spontaneous L perinephric hematoma requiring admission in 2021. Reports acute onset of L flank discomfort since Jan 2023. Gradually worsening in severity and with extending inferiorly to her sacrum.    She compares her current pain to being similar to when she was admitted for same. Presently, her pain is well controlled with taking toradol PRN at night before going to sleep. Denies any history of stones.      RBUS (03/11/23) personally reviewed:   Noted significant hydronephrosis bilaterally;  Left > right.     PMH: HTN, HLD, APS, CKD, membranous nephropathy (followed by Dr. Sanders), PE (dx 09/2021; on coumadin), left renal vein thrombosis with renal infarction (07/2021), anemia, asthma bronchitis, cataracts bilaterally,  eye pain, dizziness.  FH: of breast and lung cancer (Mother),   Denies FHx of  malignancy.  SH: Non-smoker.    Past Medical History:   Diagnosis Date    Body mass index (BMI) 32.0-32.9, adult     BMI 32.0-32.9,adult    Body mass index (BMI) 32.0-32.9, adult     BMI 32.0-32.9,adult    Body mass index (BMI) 35.0-35.9, adult     BMI 35.0-35.9,adult    Migraine, unspecified, not intractable, without status migrainosus 2020    Migraine, unspecified, not intractable, without status migrainosus    Nausea 2015    Nausea    Other specified health status     No pertinent past medical history    Other specified health status     No pertinent past surgical history    Unspecified asthma, uncomplicated ([facility]) 2022    Asthma     Past Surgical History:   Procedure Laterality Date    IR EMBOLIZATION LYMPH NODE Bilateral 2021    IR EMBOLIZATION LYMPH NODE 2021 [facility] IN[facility]    TONSILLECTOMY  2016    Tonsillectomy     Family History   Problem Relation Name Age of Onset    Arthritis Mother      Diabetes Mother      Hypertension Mother      Cancer Mother      Breast cancer Mother      Migraines Mother      Arthritis Father      Hypertension Father      Cancer Maternal Grandmother      Breast cancer Maternal Grandmother      Other (primary cervical cancer) Maternal Grandmother      Alzheimer's disease Paternal Grandmother      Stroke Paternal Grandmother       Social History     Tobacco Use   Smoking Status Former    Current packs/day: 0.00    Types: Cigarettes    Quit date:     Years since quittin.2   Smokeless Tobacco Never     Current Outpatient Medications   Medication Sig Dispense Refill    albuterol 2.5 mg /3 mL (0.083 %) nebulizer solution Take 3 mL (2.5 mg) by nebulization every 6 hours if needed for wheezing.      albuterol 90 mcg/actuation inhaler Inhale 2 puffs every 6 hours if needed for wheezing. 18 g 5    allopurinol (Zyloprim) 100 mg tablet TAKE ONE TABLET BY MOUTH  EVERY DAY 30 tablet 0    amitriptyline (Elavil) 50 mg tablet Take 1 tablet (50 mg) by mouth once daily at bedtime. 90 tablet 1    darbepoetin alva (Aranesp, in polysorbate,) syringe Inject 0.4 mL (10 mcg) under the skin 1 time.      ferrous sulfate 325 (65 Fe) MG tablet Take 1 tablet (325 mg) by mouth once daily.      fluticasone (Flonase) 50 mcg/actuation nasal spray Administer 1 spray into each nostril once daily. Shake gently. Before first use, prime pump. After use, clean tip and replace cap. 16 g 5    folic acid (Folvite) 1 mg tablet Take 1 tablet (1 mg) by mouth once daily.      furosemide (Lasix) 40 mg tablet Take 1 tablet (40 mg) by mouth 2 times daily (morning and late afternoon). 180 tablet 3    gabapentin (Neurontin) 100 mg capsule Take 1 capsule (100 mg) by mouth once daily at bedtime. (Patient taking differently: Take 2 capsules (200 mg) by mouth 3 times a day. Patient reported) 90 capsule 1    hydroCHLOROthiazide (HYDRODiuril) 25 mg tablet Take 1 tablet (25 mg) by mouth once daily.      Jantoven 2.5 mg tablet Take 2 tablets (5 mg) by mouth once daily at bedtime. Dose fluctuates with INR      leg brace (Knee Brace Large-XLarge) misc       levothyroxine (Synthroid, Levoxyl) 25 mcg tablet Take 1 tablet (25 mcg) by mouth once daily.      lisinopril 10 mg tablet Take 1 tablet (10 mg) by mouth once daily. 90 tablet 3    melatonin 3 mg capsule Take by mouth.      ondansetron ODT (Zofran-ODT) 4 mg disintegrating tablet Take by mouth.      Ozempic 1 mg/dose (4 mg/3 mL) pen injector Inject 1 mg under the skin 1 (one) time per week.      pantoprazole (ProtoNix) 40 mg EC tablet Take by mouth.      potassium chloride CR (Klor-Con M20) 20 mEq ER tablet TAKE ONE TABLET BY MOUTH TWO TIMES A DAY or as directed (Patient not taking: Reported on 2/24/2025) 180 tablet 1    pravastatin (Pravachol) 40 mg tablet Take 1 tablet (40 mg) by mouth once daily. 90 tablet 1    sertraline (Zoloft) 50 mg tablet Take 1 tablet (50 mg) by  mouth once daily.      SITagliptin phosphate (Januvia) 25 mg tablet Take 1 tablet (25 mg) by mouth once daily. 90 tablet 1    Symbicort 160-4.5 mcg/actuation inhaler INHALE TWO PUFFS BY MOUTH TWO TIMES A DAY.  rinse mouth after use 10.2 g 0    topiramate (Topamax) 100 mg tablet Take 1 tablet (100 mg) by mouth 2 times a day. 180 tablet 1    topiramate (Topamax) 25 mg tablet Take 1 tablet (25 mg) by mouth once daily. 90 tablet 1    vit B complex no.12/niacin,B3, (VITAMIN B COMPLEX NO.12-NIACIN ORAL) Take 1 tablet by mouth once daily.      vitamin E 180 mg (400 unit) capsule 1 po every day. 90 capsule 1     No current facility-administered medications for this visit.     Allergies   Allergen Reactions    Diphenhydramine Hcl Swelling    Erythromycin Hives and GI Upset    Humibid Dm [Dextromethorphan-Pot Guaiac-Gg] Unknown     Past medical, surgical, family and social history in the chart was reviewed and is accurate including any additions to what is in this HPI.    REVIEW OF SYSTEMS (ROS):   Constitutional: denies any unintentional weight loss or change in strength.  Integumentary: denies any rashes or pruritus.  Eyes: denies any double vision or eye pain.  Ear/Nose/Mouth/Throat: denies any nosebleeds or gum bleeds.  Cardiovascular: denies any chest pain or syncope.  Respiratory: denies hemoptysis.  Gastrointestinal: denies nausea or vomiting.  Musculoskeletal: denies muscle cramping or weakness.  Neurologic: denies convulsions or seizures.  Hematologic/Lymphatic: denies bleeding tendencies.  Endocrine: denies heat/cold intolerance.  All other systems have been reviewed and are negative unless otherwise noted in the HPI.     OBJECTIVE:  Visit Vitals  Temp 36.2 °C (97.2 °F)     PHYSICAL EXAM:  Constitutional: No obvious distress.  Eyes: Non-injected conjunctiva, sclera clear, EOMI.  Ears/Nose/Mouth/Throat: No obvious drainage per ears or nose.  Cardiovascular: Extremities are warm and well perfused. No edema, cyanosis  or pallor.  Respiratory: No audible wheezing/stridor; respirations do not appear labored.  Gastrointestinal: Abdomen soft, not distended.  Musculoskeletal: Normal ROM of extremities.  Skin: No obvious rashes or open sores.  Neurologic: Alert and oriented, CN 2-12 grossly intact.  Psychiatric: Answers questions appropriately with normal affect.  Hematologic/Lymphatic/Immunologic: No obvious bruises or sites of spontaneous bleeding.  Genitourinary: No CVA tenderness, bladder not palpable.     LABS & IMAGING:  Lab Results   Component Value Date    WBC 7.7 03/26/2025    HGB 9.5 (L) 03/26/2025    HCT 31.0 (L) 03/26/2025     03/26/2025    CHOL 303 (H) 12/07/2023    TRIG 595 (H) 12/07/2023    HDL 44.2 12/07/2023    ALT 7 12/07/2023    AST 11 12/07/2023     03/26/2025    K 4.1 03/26/2025     03/26/2025    CREATININE 1.80 (H) 03/26/2025    BUN 34 (H) 03/26/2025    CO2 22 03/26/2025    TSH 1.15 08/26/2024    INR 3.4 (H) 03/31/2025    HGBA1C 5.5 01/23/2025     CT A&P (10/19/23)  The pancreatitis in the prior study appears to have resolved. The  pancreas is mildly atrophic but there is no residual edema, pseudocyst  or calcification.    The left kidney again shows coarse calcifications or vascular coils  with some atrophy but there is no change from the prior study. The  right kidney remains normal and the urinary bladder is unremarkable.    There are multiple subcutaneous nodular densities over the lower  abdomen and pelvis bilaterally. These are new from the prior study and  may represent multiple injection sites.    Scribed for Dr. Ian Avalos by Abdulkadir Drummond.  I, Dr. Ian Avalos have personally reviewed and agreed with the information entered by the Virtual Scribe. 03/31/25.

## 2025-04-01 ENCOUNTER — HOSPITAL ENCOUNTER (OUTPATIENT)
Dept: RADIOLOGY | Facility: HOSPITAL | Age: 55
Discharge: HOME | End: 2025-04-01
Payer: MEDICARE

## 2025-04-01 DIAGNOSIS — N13.39 OTHER HYDRONEPHROSIS: ICD-10-CM

## 2025-04-01 DIAGNOSIS — N26.1 RENAL ATROPHY, LEFT: ICD-10-CM

## 2025-04-01 PROCEDURE — A9562 TC99M MERTIATIDE: HCPCS | Performed by: STUDENT IN AN ORGANIZED HEALTH CARE EDUCATION/TRAINING PROGRAM

## 2025-04-01 PROCEDURE — 78708 K FLOW/FUNCT IMAGE W/DRUG: CPT | Performed by: STUDENT IN AN ORGANIZED HEALTH CARE EDUCATION/TRAINING PROGRAM

## 2025-04-01 PROCEDURE — 3430000001 HC RX 343 DIAGNOSTIC RADIOPHARMACEUTICALS: Performed by: STUDENT IN AN ORGANIZED HEALTH CARE EDUCATION/TRAINING PROGRAM

## 2025-04-01 PROCEDURE — 78708 K FLOW/FUNCT IMAGE W/DRUG: CPT

## 2025-04-01 RX ORDER — FUROSEMIDE 10 MG/ML
40 INJECTION INTRAMUSCULAR; INTRAVENOUS ONCE
Status: CANCELLED | OUTPATIENT
Start: 2025-04-01

## 2025-04-01 RX ORDER — BETIATIDE 1 MG/1
11.1 INJECTION, POWDER, LYOPHILIZED, FOR SOLUTION INTRAVENOUS
Status: COMPLETED | OUTPATIENT
Start: 2025-04-01 | End: 2025-04-01

## 2025-04-01 RX ADMIN — TECHNESCAN TC 99M MERTIATIDE 11.1 MILLICURIE: 1 INJECTION, POWDER, LYOPHILIZED, FOR SOLUTION INTRAVENOUS at 12:18

## 2025-04-02 ENCOUNTER — LAB REQUISITION (OUTPATIENT)
Dept: LAB | Facility: HOSPITAL | Age: 55
End: 2025-04-02
Payer: MEDICARE

## 2025-04-02 DIAGNOSIS — Z79.01 LONG TERM (CURRENT) USE OF ANTICOAGULANTS: ICD-10-CM

## 2025-04-02 LAB
BACTERIA UR CULT: ABNORMAL
INR PPP: 2.8 (ref 0.9–1.1)
PROTHROMBIN TIME: 31.5 SECONDS (ref 9.8–12.4)

## 2025-04-02 PROCEDURE — 85610 PROTHROMBIN TIME: CPT | Mod: OUT | Performed by: INTERNAL MEDICINE

## 2025-04-02 PROCEDURE — 36415 COLL VENOUS BLD VENIPUNCTURE: CPT | Mod: OUT | Performed by: INTERNAL MEDICINE

## 2025-04-04 ENCOUNTER — LAB REQUISITION (OUTPATIENT)
Dept: LAB | Facility: HOSPITAL | Age: 55
End: 2025-04-04
Payer: MEDICARE

## 2025-04-04 DIAGNOSIS — Z79.01 LONG TERM (CURRENT) USE OF ANTICOAGULANTS: ICD-10-CM

## 2025-04-04 DIAGNOSIS — N30.00 ACUTE CYSTITIS WITHOUT HEMATURIA: Primary | ICD-10-CM

## 2025-04-04 LAB
INR PPP: 2.5 (ref 0.9–1.1)
PROTHROMBIN TIME: 28.1 SECONDS (ref 9.8–12.4)

## 2025-04-04 PROCEDURE — 85610 PROTHROMBIN TIME: CPT | Mod: OUT | Performed by: INTERNAL MEDICINE

## 2025-04-04 PROCEDURE — 36415 COLL VENOUS BLD VENIPUNCTURE: CPT | Mod: OUT | Performed by: INTERNAL MEDICINE

## 2025-04-04 RX ORDER — SULFAMETHOXAZOLE AND TRIMETHOPRIM 800; 160 MG/1; MG/1
1 TABLET ORAL 2 TIMES DAILY
Qty: 20 TABLET | Refills: 0 | Status: SHIPPED | OUTPATIENT
Start: 2025-04-04 | End: 2025-04-14

## 2025-04-06 DIAGNOSIS — N05.2 MEMBRANOUS GLOMERULONEPHRITIS: Primary | ICD-10-CM

## 2025-04-07 ENCOUNTER — LAB REQUISITION (OUTPATIENT)
Dept: LAB | Facility: HOSPITAL | Age: 55
End: 2025-04-07
Payer: MEDICARE

## 2025-04-07 DIAGNOSIS — N18.9 CHRONIC KIDNEY DISEASE, UNSPECIFIED: ICD-10-CM

## 2025-04-07 DIAGNOSIS — Z79.01 LONG TERM (CURRENT) USE OF ANTICOAGULANTS: ICD-10-CM

## 2025-04-07 LAB
INR PPP: 3.2 (ref 0.9–1.1)
PROTHROMBIN TIME: 35.6 SECONDS (ref 9.8–12.4)

## 2025-04-07 PROCEDURE — 85610 PROTHROMBIN TIME: CPT | Mod: OUT | Performed by: INTERNAL MEDICINE

## 2025-04-07 PROCEDURE — 36415 COLL VENOUS BLD VENIPUNCTURE: CPT | Mod: OUT | Performed by: INTERNAL MEDICINE

## 2025-04-10 ENCOUNTER — LAB REQUISITION (OUTPATIENT)
Dept: LAB | Facility: HOSPITAL | Age: 55
End: 2025-04-10
Payer: MEDICARE

## 2025-04-10 DIAGNOSIS — Z79.01 LONG TERM (CURRENT) USE OF ANTICOAGULANTS: ICD-10-CM

## 2025-04-10 LAB
INR PPP: 1.7 (ref 0.9–1.1)
PROTHROMBIN TIME: 18.6 SECONDS (ref 9.8–12.4)

## 2025-04-10 PROCEDURE — 85610 PROTHROMBIN TIME: CPT | Mod: OUT | Performed by: INTERNAL MEDICINE

## 2025-04-10 PROCEDURE — 36415 COLL VENOUS BLD VENIPUNCTURE: CPT | Mod: OUT | Performed by: INTERNAL MEDICINE

## 2025-04-14 ENCOUNTER — LAB REQUISITION (OUTPATIENT)
Dept: LAB | Facility: HOSPITAL | Age: 55
End: 2025-04-14
Payer: MEDICARE

## 2025-04-14 DIAGNOSIS — Z79.01 LONG TERM (CURRENT) USE OF ANTICOAGULANTS: ICD-10-CM

## 2025-04-14 LAB
INR PPP: 1.1 (ref 0.9–1.1)
PROTHROMBIN TIME: 12 SECONDS (ref 9.8–12.4)

## 2025-04-14 PROCEDURE — 36415 COLL VENOUS BLD VENIPUNCTURE: CPT | Mod: OUT | Performed by: INTERNAL MEDICINE

## 2025-04-14 PROCEDURE — 85610 PROTHROMBIN TIME: CPT | Mod: OUT | Performed by: INTERNAL MEDICINE

## 2025-04-15 ENCOUNTER — APPOINTMENT (OUTPATIENT)
Dept: NEPHROLOGY | Facility: CLINIC | Age: 55
End: 2025-04-15
Payer: MEDICARE

## 2025-04-15 VITALS
SYSTOLIC BLOOD PRESSURE: 110 MMHG | TEMPERATURE: 98.7 F | BODY MASS INDEX: 37.11 KG/M2 | DIASTOLIC BLOOD PRESSURE: 72 MMHG | HEART RATE: 94 BPM | OXYGEN SATURATION: 96 % | WEIGHT: 190 LBS

## 2025-04-15 DIAGNOSIS — N05.2 MEMBRANOUS GLOMERULONEPHRITIS: Primary | ICD-10-CM

## 2025-04-15 DIAGNOSIS — I12.9 HYPERTENSIVE KIDNEY DISEASE WITH STAGE 3B CHRONIC KIDNEY DISEASE (MULTI): ICD-10-CM

## 2025-04-15 DIAGNOSIS — N18.32 HYPERTENSIVE KIDNEY DISEASE WITH STAGE 3B CHRONIC KIDNEY DISEASE (MULTI): ICD-10-CM

## 2025-04-15 PROCEDURE — 4010F ACE/ARB THERAPY RXD/TAKEN: CPT | Performed by: INTERNAL MEDICINE

## 2025-04-15 PROCEDURE — 3074F SYST BP LT 130 MM HG: CPT | Performed by: INTERNAL MEDICINE

## 2025-04-15 PROCEDURE — 1036F TOBACCO NON-USER: CPT | Performed by: INTERNAL MEDICINE

## 2025-04-15 PROCEDURE — 3044F HG A1C LEVEL LT 7.0%: CPT | Performed by: INTERNAL MEDICINE

## 2025-04-15 PROCEDURE — 3060F POS MICROALBUMINURIA REV: CPT | Performed by: INTERNAL MEDICINE

## 2025-04-15 PROCEDURE — 99213 OFFICE O/P EST LOW 20 MIN: CPT | Performed by: INTERNAL MEDICINE

## 2025-04-15 PROCEDURE — 3078F DIAST BP <80 MM HG: CPT | Performed by: INTERNAL MEDICINE

## 2025-04-15 ASSESSMENT — PAIN SCALES - GENERAL: PAINLEVEL_OUTOF10: 0-NO PAIN

## 2025-04-15 NOTE — PROGRESS NOTES
Chief Complaint: Follow up CKD    No pain  Recent UTI today is the last day of bactrim   Losing weight with ozempic  Denies nausea, vomiting, chest pain, dyspnea  No urinary symptoms  Still living in nursing facility    NAD  Sclera AI s inj  MMM, no sores  Deferred secondary to COVID  No edema except L ankle swelling  No tremor  No rash    CKD stage 3b   BP at goal  Proteinuria much improved  PLA2R negative  L kidney hydro    Labs today  Follow up with urology given hydroureter  Labs and follow up in three months

## 2025-04-16 LAB
25(OH)D3+25(OH)D2 SERPL-MCNC: 44 NG/ML (ref 30–100)
PTH-INTACT SERPL-MCNC: 63 PG/ML (ref 16–77)

## 2025-04-17 ENCOUNTER — LAB REQUISITION (OUTPATIENT)
Dept: LAB | Facility: HOSPITAL | Age: 55
End: 2025-04-17
Payer: MEDICARE

## 2025-04-17 DIAGNOSIS — Z79.01 LONG TERM (CURRENT) USE OF ANTICOAGULANTS: ICD-10-CM

## 2025-04-17 DIAGNOSIS — N18.9 CHRONIC KIDNEY DISEASE, UNSPECIFIED: ICD-10-CM

## 2025-04-17 LAB
INR PPP: 2.2 (ref 0.9–1.1)
PROTHROMBIN TIME: 24.8 SECONDS (ref 9.8–12.4)

## 2025-04-17 PROCEDURE — 85610 PROTHROMBIN TIME: CPT | Mod: OUT | Performed by: INTERNAL MEDICINE

## 2025-04-17 PROCEDURE — 36415 COLL VENOUS BLD VENIPUNCTURE: CPT | Mod: OUT | Performed by: INTERNAL MEDICINE

## 2025-04-19 LAB
ALBUMIN SERPL-MCNC: 4.2 G/DL (ref 3.6–5.1)
ALBUMIN/CREAT UR: 43 MG/G CREAT
APPEARANCE UR: CLEAR
BACTERIA #/AREA URNS HPF: ABNORMAL /HPF
BASOPHILS # BLD AUTO: 29 CELLS/UL (ref 0–200)
BASOPHILS NFR BLD AUTO: 0.5 %
BILIRUB UR QL STRIP: NEGATIVE
BUN SERPL-MCNC: 42 MG/DL (ref 7–25)
BUN/CREAT SERPL: 18 (CALC) (ref 6–22)
CALCIUM SERPL-MCNC: 9.1 MG/DL (ref 8.6–10.4)
CHLORIDE SERPL-SCNC: 108 MMOL/L (ref 98–110)
CO2 SERPL-SCNC: 23 MMOL/L (ref 20–32)
COLOR UR: YELLOW
CREAT SERPL-MCNC: 2.32 MG/DL (ref 0.5–1.03)
CREAT UR-MCNC: 23 MG/DL (ref 20–275)
CREAT UR-MCNC: 23 MG/DL (ref 20–275)
EGFRCR SERPLBLD CKD-EPI 2021: 24 ML/MIN/1.73M2
EOSINOPHIL # BLD AUTO: 80 CELLS/UL (ref 15–500)
EOSINOPHIL NFR BLD AUTO: 1.4 %
ERYTHROCYTE [DISTWIDTH] IN BLOOD BY AUTOMATED COUNT: 14.8 % (ref 11–15)
GLUCOSE SERPL-MCNC: 118 MG/DL (ref 65–139)
GLUCOSE UR QL STRIP: NEGATIVE
HCT VFR BLD AUTO: 32.2 % (ref 35–45)
HGB BLD-MCNC: 10.4 G/DL (ref 11.7–15.5)
HGB UR QL STRIP: NEGATIVE
HYALINE CASTS #/AREA URNS LPF: ABNORMAL /LPF
KETONES UR QL STRIP: NEGATIVE
LEUKOCYTE ESTERASE UR QL STRIP: ABNORMAL
LYMPHOCYTES # BLD AUTO: 1237 CELLS/UL (ref 850–3900)
LYMPHOCYTES NFR BLD AUTO: 21.7 %
MCH RBC QN AUTO: 31.3 PG (ref 27–33)
MCHC RBC AUTO-ENTMCNC: 32.3 G/DL (ref 32–36)
MCV RBC AUTO: 97 FL (ref 80–100)
MICROALBUMIN UR-MCNC: 1 MG/DL
MONOCYTES # BLD AUTO: 359 CELLS/UL (ref 200–950)
MONOCYTES NFR BLD AUTO: 6.3 %
NEUTROPHILS # BLD AUTO: 3996 CELLS/UL (ref 1500–7800)
NEUTROPHILS NFR BLD AUTO: 70.1 %
NITRITE UR QL STRIP: NEGATIVE
PH UR STRIP: 6.5 [PH] (ref 5–8)
PHOSPHATE SERPL-MCNC: 4 MG/DL (ref 2.5–4.5)
PLA2R IGG SERPL QL IF: NEGATIVE
PLATELET # BLD AUTO: 176 THOUSAND/UL (ref 140–400)
PMV BLD REES-ECKER: 10.2 FL (ref 7.5–12.5)
POTASSIUM SERPL-SCNC: 4.9 MMOL/L (ref 3.5–5.3)
PROT UR QL STRIP: NEGATIVE
PROT UR-MCNC: 7 MG/DL (ref 5–24)
PROT/CREAT UR: 0.3 MG/MG CREAT (ref 0.02–0.18)
PROT/CREAT UR: 304 MG/G CREAT (ref 24–184)
RBC # BLD AUTO: 3.32 MILLION/UL (ref 3.8–5.1)
RBC #/AREA URNS HPF: ABNORMAL /HPF
SERVICE CMNT-IMP: ABNORMAL
SODIUM SERPL-SCNC: 140 MMOL/L (ref 135–146)
SP GR UR STRIP: 1.01 (ref 1–1.03)
SQUAMOUS #/AREA URNS HPF: ABNORMAL /HPF
WBC # BLD AUTO: 5.7 THOUSAND/UL (ref 3.8–10.8)
WBC #/AREA URNS HPF: ABNORMAL /HPF

## 2025-04-21 ENCOUNTER — LAB REQUISITION (OUTPATIENT)
Dept: LAB | Facility: HOSPITAL | Age: 55
End: 2025-04-21
Payer: MEDICARE

## 2025-04-21 DIAGNOSIS — Z79.01 LONG TERM (CURRENT) USE OF ANTICOAGULANTS: ICD-10-CM

## 2025-04-21 LAB
INR PPP: 2.6 (ref 0.9–1.1)
PROTHROMBIN TIME: 28.5 SECONDS (ref 9.8–12.4)

## 2025-04-21 PROCEDURE — 85610 PROTHROMBIN TIME: CPT | Mod: OUT | Performed by: INTERNAL MEDICINE

## 2025-04-21 PROCEDURE — 36415 COLL VENOUS BLD VENIPUNCTURE: CPT | Mod: OUT | Performed by: INTERNAL MEDICINE

## 2025-04-23 ENCOUNTER — HOSPITAL ENCOUNTER (OUTPATIENT)
Dept: DIALYSIS | Facility: HOSPITAL | Age: 55
Setting detail: DIALYSIS SERIES
Discharge: HOME | End: 2025-04-23
Payer: MEDICARE

## 2025-04-23 VITALS
HEART RATE: 86 BPM | DIASTOLIC BLOOD PRESSURE: 76 MMHG | BODY MASS INDEX: 37.98 KG/M2 | WEIGHT: 194.45 LBS | SYSTOLIC BLOOD PRESSURE: 111 MMHG

## 2025-04-23 DIAGNOSIS — N18.4 CHRONIC KIDNEY DISEASE, STAGE 4 (SEVERE) (MULTI): ICD-10-CM

## 2025-04-23 DIAGNOSIS — D63.1 ANEMIA DUE TO STAGE 4 CHRONIC KIDNEY DISEASE: Primary | ICD-10-CM

## 2025-04-23 DIAGNOSIS — N18.4 ANEMIA DUE TO STAGE 4 CHRONIC KIDNEY DISEASE: Primary | ICD-10-CM

## 2025-04-23 LAB
ALBUMIN SERPL BCP-MCNC: 3.8 G/DL (ref 3.4–5)
ANION GAP SERPL CALC-SCNC: 13 MMOL/L (ref 10–20)
BUN SERPL-MCNC: 33 MG/DL (ref 6–23)
CALCIUM SERPL-MCNC: 8.2 MG/DL (ref 8.6–10.6)
CHLORIDE SERPL-SCNC: 109 MMOL/L (ref 98–107)
CO2 SERPL-SCNC: 23 MMOL/L (ref 21–32)
CREAT SERPL-MCNC: 1.44 MG/DL (ref 0.5–1.05)
EGFRCR SERPLBLD CKD-EPI 2021: 43 ML/MIN/1.73M*2
ERYTHROCYTE [DISTWIDTH] IN BLOOD BY AUTOMATED COUNT: 15 % (ref 11.5–14.5)
GLUCOSE SERPL-MCNC: 87 MG/DL (ref 74–99)
HCT VFR BLD AUTO: 30.2 % (ref 36–46)
HGB BLD-MCNC: 9.8 G/DL (ref 12–16)
MCH RBC QN AUTO: 30.8 PG (ref 26–34)
MCHC RBC AUTO-ENTMCNC: 32.5 G/DL (ref 32–36)
MCV RBC AUTO: 95 FL (ref 80–100)
NRBC BLD-RTO: 0 /100 WBCS (ref 0–0)
PHOSPHATE SERPL-MCNC: 3.3 MG/DL (ref 2.5–4.9)
PLATELET # BLD AUTO: 137 X10*3/UL (ref 150–450)
POTASSIUM SERPL-SCNC: 4.2 MMOL/L (ref 3.5–5.3)
RBC # BLD AUTO: 3.18 X10*6/UL (ref 4–5.2)
SODIUM SERPL-SCNC: 141 MMOL/L (ref 136–145)
WBC # BLD AUTO: 6.1 X10*3/UL (ref 4.4–11.3)

## 2025-04-23 PROCEDURE — 96372 THER/PROPH/DIAG INJ SC/IM: CPT

## 2025-04-23 PROCEDURE — 96374 THER/PROPH/DIAG INJ IV PUSH: CPT

## 2025-04-23 PROCEDURE — 2500000004 HC RX 250 GENERAL PHARMACY W/ HCPCS (ALT 636 FOR OP/ED): Mod: JZ,TB

## 2025-04-23 PROCEDURE — 36415 COLL VENOUS BLD VENIPUNCTURE: CPT

## 2025-04-23 PROCEDURE — 36415 COLL VENOUS BLD VENIPUNCTURE: CPT | Performed by: INTERNAL MEDICINE

## 2025-04-23 PROCEDURE — 80069 RENAL FUNCTION PANEL: CPT | Performed by: INTERNAL MEDICINE

## 2025-04-23 PROCEDURE — 85027 COMPLETE CBC AUTOMATED: CPT | Performed by: INTERNAL MEDICINE

## 2025-04-23 RX ORDER — ALBUTEROL SULFATE 0.83 MG/ML
3 SOLUTION RESPIRATORY (INHALATION) AS NEEDED
OUTPATIENT
Start: 2025-05-21

## 2025-04-23 RX ORDER — FAMOTIDINE 10 MG/ML
20 INJECTION, SOLUTION INTRAVENOUS ONCE AS NEEDED
OUTPATIENT
Start: 2025-05-21

## 2025-04-23 RX ORDER — EPINEPHRINE 0.3 MG/.3ML
0.3 INJECTION SUBCUTANEOUS EVERY 5 MIN PRN
OUTPATIENT
Start: 2025-05-21

## 2025-04-23 RX ADMIN — DARBEPOETIN ALFA 100 MCG: 100 INJECTION, SOLUTION INTRAVENOUS; SUBCUTANEOUS at 11:12

## 2025-04-23 ASSESSMENT — PAIN SCALES - GENERAL: PAINLEVEL_OUTOF10: 0-NO PAIN

## 2025-04-23 NOTE — PROGRESS NOTES
"Name: Janene Pinedo   : 1970   MRN: [id number]     Reason for Visit: No chief complaint on file.      There were no vitals filed for this visit. Blood pressure 111/76, pulse 86, weight 88.2 kg (194 lb 7.1 oz).     How is your energy? good  How is your appetite? good  Any nausea and vomiting after eating? no  Any diarrhea or constipation? no  Any SOB? no  Any Edema in lower extremities? no  Have you had any recent infections?  yes  Have you been on antibiotics recently? Last month covid  Any questions regarding your Kidney disease? no  DO you have a Dialysis accesses? no    Hemoglobin   Date/Time Value Ref Range Status   2025 11:07 AM 9.5 (L) 12.0 - 16.0 g/dL Final   2025 10:13 AM 10.2 (L) 12.0 - 16.0 g/dL Final     HEMOGLOBIN   Date/Time Value Ref Range Status   04/15/2025 10:59 AM 10.4 (L) 11.7 - 15.5 g/dL Final     Ferritin   Date/Time Value Ref Range Status   2024 01:14  (H) 8 - 150 ng/mL Final     % Saturation   Date/Time Value Ref Range Status   2024 01:14 PM 21 (L) 25 - 45 % Final        Medications Given in Clinic: Epogen 100 mcg;      Nursing notes:  Had covid last month was in isolation at nursing home for 11 days \"drove me nuts glad to be out with people\"  CBC RFP drawn.    RN: Nila Marina RN     25  "

## 2025-04-24 ENCOUNTER — LAB REQUISITION (OUTPATIENT)
Dept: LAB | Facility: HOSPITAL | Age: 55
End: 2025-04-24
Payer: MEDICARE

## 2025-04-24 DIAGNOSIS — N18.9 CHRONIC KIDNEY DISEASE, UNSPECIFIED: ICD-10-CM

## 2025-04-24 DIAGNOSIS — Z79.01 LONG TERM (CURRENT) USE OF ANTICOAGULANTS: ICD-10-CM

## 2025-04-24 LAB
INR PPP: 2.3 (ref 0.9–1.1)
PROTHROMBIN TIME: 25.5 SECONDS (ref 9.8–12.4)

## 2025-04-24 PROCEDURE — 85610 PROTHROMBIN TIME: CPT | Mod: OUT | Performed by: INTERNAL MEDICINE

## 2025-04-24 PROCEDURE — 36415 COLL VENOUS BLD VENIPUNCTURE: CPT | Mod: OUT | Performed by: INTERNAL MEDICINE

## 2025-04-28 ENCOUNTER — LAB REQUISITION (OUTPATIENT)
Dept: LAB | Facility: HOSPITAL | Age: 55
End: 2025-04-28
Payer: MEDICARE

## 2025-04-28 DIAGNOSIS — Z79.01 LONG TERM (CURRENT) USE OF ANTICOAGULANTS: ICD-10-CM

## 2025-04-28 LAB
INR PPP: 2.7 (ref 0.9–1.1)
PROTHROMBIN TIME: 30.2 SECONDS (ref 9.8–12.4)

## 2025-04-28 PROCEDURE — 85610 PROTHROMBIN TIME: CPT | Mod: OUT | Performed by: INTERNAL MEDICINE

## 2025-04-28 PROCEDURE — 36415 COLL VENOUS BLD VENIPUNCTURE: CPT | Mod: OUT | Performed by: INTERNAL MEDICINE

## 2025-05-01 ENCOUNTER — LAB REQUISITION (OUTPATIENT)
Dept: LAB | Facility: HOSPITAL | Age: 55
End: 2025-05-01
Payer: MEDICARE

## 2025-05-01 DIAGNOSIS — Z79.01 LONG TERM (CURRENT) USE OF ANTICOAGULANTS: ICD-10-CM

## 2025-05-01 DIAGNOSIS — N18.9 CHRONIC KIDNEY DISEASE, UNSPECIFIED: ICD-10-CM

## 2025-05-01 LAB
INR PPP: 3.5 (ref 0.9–1.1)
PROTHROMBIN TIME: 39 SECONDS (ref 9.8–12.4)

## 2025-05-01 PROCEDURE — 85610 PROTHROMBIN TIME: CPT | Mod: OUT | Performed by: INTERNAL MEDICINE

## 2025-05-01 PROCEDURE — 36415 COLL VENOUS BLD VENIPUNCTURE: CPT | Mod: OUT | Performed by: INTERNAL MEDICINE

## 2025-05-05 ENCOUNTER — LAB REQUISITION (OUTPATIENT)
Dept: LAB | Facility: HOSPITAL | Age: 55
End: 2025-05-05
Payer: MEDICARE

## 2025-05-05 DIAGNOSIS — Z79.01 LONG TERM (CURRENT) USE OF ANTICOAGULANTS: ICD-10-CM

## 2025-05-05 DIAGNOSIS — N18.9 CHRONIC KIDNEY DISEASE, UNSPECIFIED: ICD-10-CM

## 2025-05-05 LAB
INR PPP: 2.3 (ref 0.9–1.1)
PROTHROMBIN TIME: 25.2 SECONDS (ref 9.8–12.4)

## 2025-05-05 PROCEDURE — 36415 COLL VENOUS BLD VENIPUNCTURE: CPT | Mod: OUT | Performed by: INTERNAL MEDICINE

## 2025-05-05 PROCEDURE — 85610 PROTHROMBIN TIME: CPT | Mod: OUT | Performed by: INTERNAL MEDICINE

## 2025-05-06 ENCOUNTER — APPOINTMENT (OUTPATIENT)
Dept: UROLOGY | Facility: CLINIC | Age: 55
End: 2025-05-06
Payer: MEDICARE

## 2025-05-08 ENCOUNTER — LAB REQUISITION (OUTPATIENT)
Dept: LAB | Facility: HOSPITAL | Age: 55
End: 2025-05-08
Payer: MEDICARE

## 2025-05-08 DIAGNOSIS — N18.9 CHRONIC KIDNEY DISEASE, UNSPECIFIED: ICD-10-CM

## 2025-05-08 DIAGNOSIS — Z79.01 LONG TERM (CURRENT) USE OF ANTICOAGULANTS: ICD-10-CM

## 2025-05-08 LAB
INR PPP: 2.6 (ref 0.9–1.1)
PROTHROMBIN TIME: 29.2 SECONDS (ref 9.8–12.4)

## 2025-05-08 PROCEDURE — 85610 PROTHROMBIN TIME: CPT | Mod: OUT | Performed by: INTERNAL MEDICINE

## 2025-05-08 PROCEDURE — 36415 COLL VENOUS BLD VENIPUNCTURE: CPT | Mod: OUT | Performed by: INTERNAL MEDICINE

## 2025-05-12 ENCOUNTER — LAB REQUISITION (OUTPATIENT)
Dept: LAB | Facility: HOSPITAL | Age: 55
End: 2025-05-12
Payer: MEDICARE

## 2025-05-12 DIAGNOSIS — Z79.01 LONG TERM (CURRENT) USE OF ANTICOAGULANTS: ICD-10-CM

## 2025-05-12 LAB
INR PPP: 3.2 (ref 0.9–1.1)
PROTHROMBIN TIME: 35.8 SECONDS (ref 9.8–12.4)

## 2025-05-12 PROCEDURE — 85610 PROTHROMBIN TIME: CPT | Mod: OUT | Performed by: INTERNAL MEDICINE

## 2025-05-12 PROCEDURE — 36415 COLL VENOUS BLD VENIPUNCTURE: CPT | Mod: OUT | Performed by: INTERNAL MEDICINE

## 2025-05-16 ENCOUNTER — LAB REQUISITION (OUTPATIENT)
Dept: LAB | Facility: HOSPITAL | Age: 55
End: 2025-05-16
Payer: MEDICARE

## 2025-05-16 DIAGNOSIS — Z79.01 LONG TERM (CURRENT) USE OF ANTICOAGULANTS: ICD-10-CM

## 2025-05-16 LAB
INR PPP: 1.3 (ref 0.9–1.1)
PROTHROMBIN TIME: 14.8 SECONDS (ref 9.8–12.4)

## 2025-05-16 PROCEDURE — 36415 COLL VENOUS BLD VENIPUNCTURE: CPT | Mod: OUT | Performed by: INTERNAL MEDICINE

## 2025-05-16 PROCEDURE — 85610 PROTHROMBIN TIME: CPT | Mod: OUT | Performed by: INTERNAL MEDICINE

## 2025-05-19 ENCOUNTER — LAB REQUISITION (OUTPATIENT)
Dept: LAB | Facility: HOSPITAL | Age: 55
End: 2025-05-19
Payer: MEDICARE

## 2025-05-19 DIAGNOSIS — N18.9 CHRONIC KIDNEY DISEASE, UNSPECIFIED: ICD-10-CM

## 2025-05-19 LAB
INR PPP: 1.6 (ref 0.9–1.1)
PROTHROMBIN TIME: 17.2 SECONDS (ref 9.8–12.4)

## 2025-05-19 PROCEDURE — 36415 COLL VENOUS BLD VENIPUNCTURE: CPT | Mod: OUT | Performed by: INTERNAL MEDICINE

## 2025-05-19 PROCEDURE — 85610 PROTHROMBIN TIME: CPT | Mod: OUT | Performed by: INTERNAL MEDICINE

## 2025-05-21 ENCOUNTER — HOSPITAL ENCOUNTER (OUTPATIENT)
Dept: DIALYSIS | Facility: HOSPITAL | Age: 55
Setting detail: DIALYSIS SERIES
Discharge: HOME | End: 2025-05-21
Payer: MEDICARE

## 2025-05-21 VITALS
WEIGHT: 186.73 LBS | HEART RATE: 87 BPM | DIASTOLIC BLOOD PRESSURE: 67 MMHG | SYSTOLIC BLOOD PRESSURE: 99 MMHG | BODY MASS INDEX: 36.47 KG/M2

## 2025-05-21 DIAGNOSIS — D63.1 ANEMIA DUE TO STAGE 4 CHRONIC KIDNEY DISEASE: ICD-10-CM

## 2025-05-21 DIAGNOSIS — N18.4 ANEMIA DUE TO STAGE 4 CHRONIC KIDNEY DISEASE: Primary | ICD-10-CM

## 2025-05-21 DIAGNOSIS — E61.1 IRON DEFICIENCY: Primary | ICD-10-CM

## 2025-05-21 DIAGNOSIS — N18.4 ANEMIA DUE TO STAGE 4 CHRONIC KIDNEY DISEASE: ICD-10-CM

## 2025-05-21 DIAGNOSIS — N18.4 CHRONIC KIDNEY DISEASE, STAGE 4 (SEVERE) (MULTI): ICD-10-CM

## 2025-05-21 DIAGNOSIS — D63.1 ANEMIA DUE TO STAGE 4 CHRONIC KIDNEY DISEASE: Primary | ICD-10-CM

## 2025-05-21 LAB
ALBUMIN SERPL BCP-MCNC: 4.2 G/DL (ref 3.4–5)
ANION GAP SERPL CALC-SCNC: 12 MMOL/L (ref 10–20)
BUN SERPL-MCNC: 36 MG/DL (ref 6–23)
CALCIUM SERPL-MCNC: 9.2 MG/DL (ref 8.6–10.6)
CHLORIDE SERPL-SCNC: 105 MMOL/L (ref 98–107)
CO2 SERPL-SCNC: 27 MMOL/L (ref 21–32)
CREAT SERPL-MCNC: 1.7 MG/DL (ref 0.5–1.05)
EGFRCR SERPLBLD CKD-EPI 2021: 35 ML/MIN/1.73M*2
ERYTHROCYTE [DISTWIDTH] IN BLOOD BY AUTOMATED COUNT: 14.6 % (ref 11.5–14.5)
FERRITIN SERPL-MCNC: 399 NG/ML (ref 8–150)
GLUCOSE SERPL-MCNC: 95 MG/DL (ref 74–99)
HCT VFR BLD AUTO: 31.7 % (ref 36–46)
HGB BLD-MCNC: 10.7 G/DL (ref 12–16)
IRON SATN MFR SERPL: 21 % (ref 25–45)
IRON SERPL-MCNC: 56 UG/DL (ref 35–150)
MCH RBC QN AUTO: 31 PG (ref 26–34)
MCHC RBC AUTO-ENTMCNC: 33.8 G/DL (ref 32–36)
MCV RBC AUTO: 92 FL (ref 80–100)
NRBC BLD-RTO: 0 /100 WBCS (ref 0–0)
PHOSPHATE SERPL-MCNC: 3.9 MG/DL (ref 2.5–4.9)
PLATELET # BLD AUTO: 152 X10*3/UL (ref 150–450)
POTASSIUM SERPL-SCNC: 3.7 MMOL/L (ref 3.5–5.3)
RBC # BLD AUTO: 3.45 X10*6/UL (ref 4–5.2)
SODIUM SERPL-SCNC: 140 MMOL/L (ref 136–145)
TIBC SERPL-MCNC: 273 UG/DL (ref 240–445)
UIBC SERPL-MCNC: 217 UG/DL (ref 110–370)
WBC # BLD AUTO: 8.4 X10*3/UL (ref 4.4–11.3)

## 2025-05-21 PROCEDURE — 96372 THER/PROPH/DIAG INJ SC/IM: CPT

## 2025-05-21 PROCEDURE — 83540 ASSAY OF IRON: CPT | Performed by: NURSE PRACTITIONER

## 2025-05-21 PROCEDURE — 36415 COLL VENOUS BLD VENIPUNCTURE: CPT

## 2025-05-21 PROCEDURE — 80069 RENAL FUNCTION PANEL: CPT | Performed by: NURSE PRACTITIONER

## 2025-05-21 PROCEDURE — 36415 COLL VENOUS BLD VENIPUNCTURE: CPT | Performed by: NURSE PRACTITIONER

## 2025-05-21 PROCEDURE — 2500000004 HC RX 250 GENERAL PHARMACY W/ HCPCS (ALT 636 FOR OP/ED): Mod: JZ,TB

## 2025-05-21 PROCEDURE — 85027 COMPLETE CBC AUTOMATED: CPT | Performed by: INTERNAL MEDICINE

## 2025-05-21 PROCEDURE — 82728 ASSAY OF FERRITIN: CPT | Performed by: NURSE PRACTITIONER

## 2025-05-21 RX ORDER — EPINEPHRINE 0.3 MG/.3ML
0.3 INJECTION SUBCUTANEOUS EVERY 5 MIN PRN
OUTPATIENT
Start: 2025-06-18

## 2025-05-21 RX ORDER — EPINEPHRINE 0.3 MG/.3ML
0.3 INJECTION SUBCUTANEOUS EVERY 5 MIN PRN
Status: DISCONTINUED | OUTPATIENT
Start: 2025-05-21 | End: 2025-05-22 | Stop reason: HOSPADM

## 2025-05-21 RX ORDER — EPINEPHRINE 0.3 MG/.3ML
0.3 INJECTION SUBCUTANEOUS EVERY 5 MIN PRN
OUTPATIENT
Start: 2025-06-16

## 2025-05-21 RX ORDER — ALBUTEROL SULFATE 0.83 MG/ML
3 SOLUTION RESPIRATORY (INHALATION) AS NEEDED
OUTPATIENT
Start: 2025-06-18

## 2025-05-21 RX ORDER — ALBUTEROL SULFATE 0.83 MG/ML
3 SOLUTION RESPIRATORY (INHALATION) AS NEEDED
OUTPATIENT
Start: 2025-06-16

## 2025-05-21 RX ORDER — FAMOTIDINE 10 MG/ML
20 INJECTION, SOLUTION INTRAVENOUS ONCE AS NEEDED
Status: DISCONTINUED | OUTPATIENT
Start: 2025-05-21 | End: 2025-05-22 | Stop reason: HOSPADM

## 2025-05-21 RX ORDER — FAMOTIDINE 10 MG/ML
20 INJECTION, SOLUTION INTRAVENOUS ONCE AS NEEDED
OUTPATIENT
Start: 2025-06-18

## 2025-05-21 RX ORDER — ALBUTEROL SULFATE 0.83 MG/ML
3 SOLUTION RESPIRATORY (INHALATION) AS NEEDED
Status: DISCONTINUED | OUTPATIENT
Start: 2025-05-21 | End: 2025-05-22 | Stop reason: HOSPADM

## 2025-05-21 RX ORDER — FAMOTIDINE 10 MG/ML
20 INJECTION, SOLUTION INTRAVENOUS ONCE AS NEEDED
OUTPATIENT
Start: 2025-06-16

## 2025-05-21 RX ADMIN — DARBEPOETIN ALFA 150 MCG: 150 INJECTION, SOLUTION INTRAVENOUS; SUBCUTANEOUS at 10:29

## 2025-05-21 ASSESSMENT — PAIN SCALES - GENERAL: PAINLEVEL_OUTOF10: 0-NO PAIN

## 2025-05-22 ENCOUNTER — LAB REQUISITION (OUTPATIENT)
Dept: LAB | Facility: HOSPITAL | Age: 55
End: 2025-05-22
Payer: MEDICARE

## 2025-05-22 DIAGNOSIS — N18.5 CHRONIC KIDNEY DISEASE, STAGE 5 (MULTI): ICD-10-CM

## 2025-05-22 LAB
INR PPP: 2.3 (ref 0.9–1.1)
PROTHROMBIN TIME: 25.9 SECONDS (ref 9.8–12.4)

## 2025-05-22 PROCEDURE — 85610 PROTHROMBIN TIME: CPT | Mod: OUT | Performed by: INTERNAL MEDICINE

## 2025-05-22 PROCEDURE — 36415 COLL VENOUS BLD VENIPUNCTURE: CPT | Mod: OUT | Performed by: INTERNAL MEDICINE

## 2025-05-27 ENCOUNTER — LAB REQUISITION (OUTPATIENT)
Dept: LAB | Facility: HOSPITAL | Age: 55
End: 2025-05-27
Payer: MEDICARE

## 2025-05-27 DIAGNOSIS — Z79.01 LONG TERM (CURRENT) USE OF ANTICOAGULANTS: ICD-10-CM

## 2025-05-27 DIAGNOSIS — N18.9 CHRONIC KIDNEY DISEASE, UNSPECIFIED: ICD-10-CM

## 2025-05-27 LAB
INR PPP: 3.8 (ref 0.9–1.1)
PROTHROMBIN TIME: 42.3 SECONDS (ref 9.8–12.4)

## 2025-05-27 PROCEDURE — 36415 COLL VENOUS BLD VENIPUNCTURE: CPT | Mod: OUT | Performed by: INTERNAL MEDICINE

## 2025-05-27 PROCEDURE — 85610 PROTHROMBIN TIME: CPT | Mod: OUT | Performed by: INTERNAL MEDICINE

## 2025-05-29 ENCOUNTER — LAB REQUISITION (OUTPATIENT)
Dept: LAB | Facility: HOSPITAL | Age: 55
End: 2025-05-29
Payer: MEDICARE

## 2025-05-29 DIAGNOSIS — Z79.01 LONG TERM (CURRENT) USE OF ANTICOAGULANTS: ICD-10-CM

## 2025-05-29 DIAGNOSIS — N18.9 CHRONIC KIDNEY DISEASE, UNSPECIFIED: ICD-10-CM

## 2025-05-29 LAB
INR PPP: 2.1 (ref 0.9–1.1)
PROTHROMBIN TIME: 23.6 SECONDS (ref 9.8–12.4)

## 2025-05-29 PROCEDURE — 36415 COLL VENOUS BLD VENIPUNCTURE: CPT | Mod: OUT | Performed by: INTERNAL MEDICINE

## 2025-05-29 PROCEDURE — 85610 PROTHROMBIN TIME: CPT | Mod: OUT | Performed by: INTERNAL MEDICINE

## 2025-05-30 ENCOUNTER — APPOINTMENT (OUTPATIENT)
Dept: UROLOGY | Facility: CLINIC | Age: 55
End: 2025-05-30
Payer: MEDICARE

## 2025-05-30 DIAGNOSIS — G12.29 OTHER MOTOR NEURON DISEASE: ICD-10-CM

## 2025-05-30 DIAGNOSIS — N26.1 RENAL ATROPHY, LEFT: ICD-10-CM

## 2025-05-30 DIAGNOSIS — N18.4 STAGE 4 CHRONIC KIDNEY DISEASE (MULTI): ICD-10-CM

## 2025-05-30 DIAGNOSIS — E66.01 OBESITY, MORBID (MULTI): ICD-10-CM

## 2025-05-30 DIAGNOSIS — N13.39 OTHER HYDRONEPHROSIS: Primary | ICD-10-CM

## 2025-05-30 DIAGNOSIS — N18.5 CHRONIC KIDNEY DISEASE, STAGE 5 (MULTI): ICD-10-CM

## 2025-05-30 LAB
POC APPEARANCE, URINE: CLEAR
POC BILIRUBIN, URINE: NEGATIVE
POC BLOOD, URINE: NEGATIVE
POC COLOR, URINE: YELLOW
POC GLUCOSE, URINE: NEGATIVE MG/DL
POC KETONES, URINE: NEGATIVE MG/DL
POC LEUKOCYTES, URINE: ABNORMAL
POC NITRITE,URINE: NEGATIVE
POC PH, URINE: 7 PH
POC PROTEIN, URINE: NEGATIVE MG/DL
POC SPECIFIC GRAVITY, URINE: 1.01
POC UROBILINOGEN, URINE: 0.2 EU/DL

## 2025-05-30 PROCEDURE — 4010F ACE/ARB THERAPY RXD/TAKEN: CPT | Performed by: STUDENT IN AN ORGANIZED HEALTH CARE EDUCATION/TRAINING PROGRAM

## 2025-05-30 PROCEDURE — 3060F POS MICROALBUMINURIA REV: CPT | Performed by: STUDENT IN AN ORGANIZED HEALTH CARE EDUCATION/TRAINING PROGRAM

## 2025-05-30 PROCEDURE — 1036F TOBACCO NON-USER: CPT | Performed by: STUDENT IN AN ORGANIZED HEALTH CARE EDUCATION/TRAINING PROGRAM

## 2025-05-30 PROCEDURE — 3044F HG A1C LEVEL LT 7.0%: CPT | Performed by: STUDENT IN AN ORGANIZED HEALTH CARE EDUCATION/TRAINING PROGRAM

## 2025-05-30 PROCEDURE — G2211 COMPLEX E/M VISIT ADD ON: HCPCS | Performed by: STUDENT IN AN ORGANIZED HEALTH CARE EDUCATION/TRAINING PROGRAM

## 2025-05-30 PROCEDURE — 81003 URINALYSIS AUTO W/O SCOPE: CPT | Performed by: STUDENT IN AN ORGANIZED HEALTH CARE EDUCATION/TRAINING PROGRAM

## 2025-05-30 PROCEDURE — 99213 OFFICE O/P EST LOW 20 MIN: CPT | Performed by: STUDENT IN AN ORGANIZED HEALTH CARE EDUCATION/TRAINING PROGRAM

## 2025-05-30 ASSESSMENT — PAIN SCALES - GENERAL: PAINLEVEL_OUTOF10: 8

## 2025-05-30 NOTE — PROGRESS NOTES
Scribed for Dr. Ian Avalos by Abdulkadir Drummond. I, Dr. Ian Avalos have personally reviewed and agreed with the information entered by the Virtual Scribe. 05/30/25.    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 Renal ultrasound (12/20/24) showed stable chronic findings. Renal function considered poor but has remained stable (as of 05/21/25); GFR 35; Cr 1.70. Her NM lasix scan (04/01/25) showed impaired left renal function, but no evidence of obstruction. SRF: [LEFT 16% : RIGHT 84%].    She c/o persistent L flank pain intermittently. Albeit, her pain has been well-controlled with taking her hydrocodone TID which she is prescribed for her arthritic joint pain. I am not convinced her pain is related to her left kidney. Offered pain management referral, but she declined.  Discussed options including renal surveillance versus nephrectomy. Risks, benefits, and complications discussed. She elects to continue with renal surveillance for now. Precautionary instructions reviewed, advised to call or return sooner  is her pain worsens/uncontrolled, or signs of pyelonephritis including ongoing flank pain in the setting of UTI+fever. RTC in one year with a renal ultrasound prior to.     TO REVIEW:  Offered pain management referral, but she declined.  She mentioned that she had followed up with pain management in the past.  Was treated with steroids, which did not improve her pain therefore declined the referral.    C/o persistent L sided discomfort.   Occasionally radiates to her tailbone.   Occurs daily, typically whenever she sneezes.  Attributes to her chronic renal/spinal/back/arthritic issues potentially.    #Acute cystitis ~ resolved  Urine culture (03/31/25) grew pan-sensitive E. Coli.  Since completed course of Bactrim.   IO urinalysis negative for blood or infection today.     All questions were answered to the patient’s satisfaction.  Patient agrees with the plan and wishes to proceed.  Continue follow-up for ongoing care of her chronic medical conditions.       History of Present Illness (HPI):  Janene presents for an annual follow up visit.  The patient’s EMR has been reviewed.  Lives in [place], [place], resides at a SNF.   Occupation: Disabled    Hx of left renal vein thrombosis with renal infarction (07/2021); and PE (09/2021), CKD, membranous glomerulonephritis (followed by Dr. Sanders). Developed BL hydronephrosis following her renal infarction with chronic L flank discomfort. Her hydronephrosis has since resolved, but with persistent L flank pain intermittently.  Pain has been well-controlled on toradol PRN at night. Continues follow up with Nephrology for her CKD.     TODAY: (05/30/25)  Presents for follow up and NM lasix results.   Reports persistent intermittent L flank discomfort.   Waxes and wanes in severity, no associated fevers.   States she is on chronic hydrocodone TID for her arthritic knee pain.   States he flank pain has been well-controlled with taking her hydrocodone.   She was treated for a UTI in the  interim (03/31), s/p course of Bactrim.   IO urinalysis showed trace leuks, but negative for blood or infection.     NM lasix (04/01/25) personally reviewed and independently interpreted.  1. Impaired left kidney function without evidence of obstruction.  Radiotracer retention in the left kidney with good response to Lasix,  likely representing reservoir effect.  2. Normal functioning right kidney without evidence of obstruction.  3. SRF: [LEFT 16% : RIGHT 84%]    TO REVIEW: [03/31/25]  Presents for annual follow up and ultrasound results.   Reports she has been doing well overall.   Continues to have intermittent L flank discomfort.   Albeit, has been stable over the past year.   Not bothersome enough to warrant surgery.   Denies any UTI's or gross hematuria in the interim.     Labs and imaging reviewed:  IO urinalysis showed cloudy urine, trace blood, and large leuks.   Renal function considered poor but stable (as of 03/26/25); GFR 33; Cr 1.80.    Renal ultrasound (12/20/24) personally reviewed and independently interpreted.  Atypical mild left hydronephrosis  Left kidney appears small in size.  No right sided hydronephrosis, no stones.     TO REVIEW: (06/03/24)  Reports she has been doing well.   Albeit, still experiencing L flank pain.   Reports daily occurrences; typically whenever she sneezes.  Attributes to potentially being 2/2 her chronic spinal/back/arthritic issues.   Denies any recent infections, gross hematuria, fevers or chills.   Latest CT results reviewed with patient.     CT A&P (10/19/23) personally reviewed.   LEFT kidney again shows coarse calcifications or vascular coils with some atrophy. Considered stable, no change from the prior study.   RIGHT kidney remains normal.  Urinary bladder is unremarkable.     TO REVIEW: (06/05/23)  Patient presents for follow up and CT results.   Patient reports persistent L sided flank discomfort that radiates to her tailbone.   Otherwise denies any recent UTIs,  dysuria, gross hematuria, nausea, vomiting, fevers or chills.    CT A&P wo contrast (05/25/23) personally reviewed and demonstrated:  Stable postoperative changes of her LEFT kidney;  With diffuse atrophy and multifocal cortical scarring.   LEFT hydronephrosis now resolved. No stones.  Interval resolution of previously noted LLQ abscess (since 11/14/22) with linear residual scarring along the LL mesentery.     CT findings shared with patient today and was reassured that the hydronephrosis has resolved and her kidneys do not appear obstructed.      TO REVIEW: Initial visit  Hx of of developing a spontaneous L perinephric hematoma requiring admission in 2021. Reports acute onset of L flank discomfort since Jan 2023. Gradually worsening in severity and with extending inferiorly to her sacrum.    She compares her current pain to being similar to when she was admitted for same. Presently, her pain is well controlled with taking toradol PRN at night before going to sleep. Denies any history of stones.      RBUS (03/11/23) personally reviewed:   Noted significant hydronephrosis bilaterally;  Left > right.     PMH: HTN, HLD, APS, CKD, membranous nephropathy (followed by Dr. Sanders), PE (dx 09/2021; on coumadin), left renal vein thrombosis with renal infarction (07/2021), anemia, asthma bronchitis, cataracts bilaterally, eye pain, dizziness.  FH: of breast and lung cancer (Mother),   Denies FHx of  malignancy.  SH: Non-smoker.    Past Medical History:   Diagnosis Date    Body mass index (BMI) 32.0-32.9, adult     BMI 32.0-32.9,adult    Body mass index (BMI) 32.0-32.9, adult     BMI 32.0-32.9,adult    Body mass index (BMI) 35.0-35.9, adult     BMI 35.0-35.9,adult    Migraine, unspecified, not intractable, without status migrainosus 07/31/2020    Migraine, unspecified, not intractable, without status migrainosus    Nausea 03/03/2015    Nausea    Other specified health status     No pertinent past medical history    Other  specified health status     No pertinent past surgical history    Unspecified asthma, uncomplicated ([facility]) 2022    Asthma     Past Surgical History:   Procedure Laterality Date    IR EMBOLIZATION LYMPH NODE Bilateral 2021    IR EMBOLIZATION LYMPH NODE 2021 [facility] [place] [facility]    TONSILLECTOMY  2016    Tonsillectomy     Family History   Problem Relation Name Age of Onset    Arthritis Mother      Diabetes Mother      Hypertension Mother      Cancer Mother      Breast cancer Mother      Migraines Mother      Arthritis Father      Hypertension Father      Cancer Maternal Grandmother      Breast cancer Maternal Grandmother      Other (primary cervical cancer) Maternal Grandmother      Alzheimer's disease Paternal Grandmother      Stroke Paternal Grandmother       Social History     Tobacco Use   Smoking Status Former    Current packs/day: 0.00    Types: Cigarettes    Quit date:     Years since quittin.4   Smokeless Tobacco Never     Current Outpatient Medications   Medication Sig Dispense Refill    albuterol 2.5 mg /3 mL (0.083 %) nebulizer solution Take 3 mL (2.5 mg) by nebulization every 6 hours if needed for wheezing.      albuterol 90 mcg/actuation inhaler Inhale 2 puffs every 6 hours if needed for wheezing. 18 g 5    allopurinol (Zyloprim) 100 mg tablet TAKE ONE TABLET BY MOUTH EVERY DAY 30 tablet 0    amitriptyline (Elavil) 50 mg tablet Take 1 tablet (50 mg) by mouth once daily at bedtime. 90 tablet 1    darbepoetin alva (Aranesp, in polysorbate,) syringe Inject 0.4 mL (10 mcg) under the skin 1 time.      ferrous sulfate 325 (65 Fe) MG tablet Take 1 tablet (325 mg) by mouth once daily.      fluticasone (Flonase) 50 mcg/actuation nasal spray Administer 1 spray into each nostril once daily. Shake gently. Before first use, prime pump. After use, clean tip and replace cap. 16 g 5    folic acid (Folvite) 1 mg tablet Take 1 tablet (1 mg) by mouth once daily.      furosemide (Lasix) 40  mg tablet Take 1 tablet (40 mg) by mouth 2 times daily (morning and late afternoon). 180 tablet 3    gabapentin (Neurontin) 100 mg capsule Take 1 capsule (100 mg) by mouth once daily at bedtime. (Patient taking differently: Take 2 capsules (200 mg) by mouth 3 times a day. Patient reported) 90 capsule 1    hydroCHLOROthiazide (HYDRODiuril) 25 mg tablet Take 1 tablet (25 mg) by mouth once daily.      Jantoven 2.5 mg tablet Take 2 tablets (5 mg) by mouth once daily at bedtime. Dose fluctuates with INR      leg brace (Knee Brace Large-XLarge) misc       levothyroxine (Synthroid, Levoxyl) 25 mcg tablet Take 1 tablet (25 mcg) by mouth once daily.      lisinopril 10 mg tablet Take 1 tablet (10 mg) by mouth once daily. 90 tablet 3    melatonin 3 mg capsule Take by mouth.      ondansetron ODT (Zofran-ODT) 4 mg disintegrating tablet Take by mouth.      Ozempic 1 mg/dose (4 mg/3 mL) pen injector Inject 1 mg under the skin 1 (one) time per week.      pantoprazole (ProtoNix) 40 mg EC tablet Take by mouth.      potassium chloride CR (Klor-Con M20) 20 mEq ER tablet TAKE ONE TABLET BY MOUTH TWO TIMES A DAY or as directed 180 tablet 1    pravastatin (Pravachol) 40 mg tablet Take 1 tablet (40 mg) by mouth once daily. 90 tablet 1    sertraline (Zoloft) 50 mg tablet Take 1 tablet (50 mg) by mouth once daily.      SITagliptin phosphate (Januvia) 25 mg tablet Take 1 tablet (25 mg) by mouth once daily. 90 tablet 1    Symbicort 160-4.5 mcg/actuation inhaler INHALE TWO PUFFS BY MOUTH TWO TIMES A DAY.  rinse mouth after use 10.2 g 0    topiramate (Topamax) 100 mg tablet Take 1 tablet (100 mg) by mouth 2 times a day. 180 tablet 1    topiramate (Topamax) 25 mg tablet Take 1 tablet (25 mg) by mouth once daily. 90 tablet 1    vit B complex no.12/niacin,B3, (VITAMIN B COMPLEX NO.12-NIACIN ORAL) Take 1 tablet by mouth once daily.      vitamin E 180 mg (400 unit) capsule 1 po every day. 90 capsule 1     No current facility-administered medications  for this visit.     Allergies   Allergen Reactions    Diphenhydramine Hcl Swelling    Erythromycin Hives and GI Upset    Humibid Dm [Dextromethorphan-Pot Guaiac-Gg] Unknown     Past medical, surgical, family and social history in the chart was reviewed and is accurate including any additions to what is in this HPI.    REVIEW OF SYSTEMS (ROS):   Constitutional: denies any unintentional weight loss or change in strength.  Integumentary: denies any rashes or pruritus.  Eyes: denies any double vision or eye pain.  Ear/Nose/Mouth/Throat: denies any nosebleeds or gum bleeds.  Cardiovascular: denies any chest pain or syncope.  Respiratory: denies hemoptysis.  Gastrointestinal: denies nausea or vomiting.  Musculoskeletal: denies muscle cramping or weakness.  Neurologic: denies convulsions or seizures.  Hematologic/Lymphatic: denies bleeding tendencies.  Endocrine: denies heat/cold intolerance.  All other systems have been reviewed and are negative unless otherwise noted in the HPI.     OBJECTIVE:  There were no vitals taken for this visit.    PHYSICAL EXAM:  Constitutional: No obvious distress.  Eyes: Non-injected conjunctiva, sclera clear, EOMI.  Ears/Nose/Mouth/Throat: No obvious drainage per ears or nose.  Cardiovascular: Extremities are warm and well perfused. No edema, cyanosis or pallor.  Respiratory: No audible wheezing/stridor; respirations do not appear labored.  Gastrointestinal: Abdomen soft, not distended.  Musculoskeletal: Normal ROM of extremities.  Skin: No obvious rashes or open sores.  Neurologic: Alert and oriented, CN 2-12 grossly intact.  Psychiatric: Answers questions appropriately with normal affect.  Hematologic/Lymphatic/Immunologic: No obvious bruises or sites of spontaneous bleeding.  Genitourinary: No CVA tenderness, bladder not palpable.     LABS & IMAGING:  Lab Results   Component Value Date    WBC 8.4 05/21/2025    HGB 10.7 (L) 05/21/2025    HCT 31.7 (L) 05/21/2025     05/21/2025    CHOL  303 (H) 12/07/2023    TRIG 595 (H) 12/07/2023    HDL 44.2 12/07/2023    ALT 7 12/07/2023    AST 11 12/07/2023     05/21/2025    K 3.7 05/21/2025     05/21/2025    CREATININE 1.70 (H) 05/21/2025    BUN 36 (H) 05/21/2025    CO2 27 05/21/2025    TSH 1.15 08/26/2024    INR 2.1 (H) 05/29/2025    HGBA1C 5.5 01/23/2025     CT A&P (10/19/23)  The pancreatitis in the prior study appears to have resolved. The  pancreas is mildly atrophic but there is no residual edema, pseudocyst  or calcification.    The left kidney again shows coarse calcifications or vascular coils  with some atrophy but there is no change from the prior study. The  right kidney remains normal and the urinary bladder is unremarkable.    There are multiple subcutaneous nodular densities over the lower  abdomen and pelvis bilaterally. These are new from the prior study and  may represent multiple injection sites.    Scribed for Dr. Ian Avalos by Abdulkadir Drummond.  I, Dr. Ian Avalos have personally reviewed and agreed with the information entered by the Virtual Scribe. 05/30/25.

## 2025-06-02 ENCOUNTER — LAB REQUISITION (OUTPATIENT)
Dept: LAB | Facility: HOSPITAL | Age: 55
End: 2025-06-02
Payer: MEDICARE

## 2025-06-02 DIAGNOSIS — N18.9 CHRONIC KIDNEY DISEASE, UNSPECIFIED: ICD-10-CM

## 2025-06-02 DIAGNOSIS — Z79.01 LONG TERM (CURRENT) USE OF ANTICOAGULANTS: ICD-10-CM

## 2025-06-02 LAB — APTT PPP: 57 SECONDS (ref 26–36)

## 2025-06-02 PROCEDURE — 85730 THROMBOPLASTIN TIME PARTIAL: CPT | Mod: OUT | Performed by: INTERNAL MEDICINE

## 2025-06-02 PROCEDURE — 36415 COLL VENOUS BLD VENIPUNCTURE: CPT | Mod: OUT | Performed by: INTERNAL MEDICINE

## 2025-06-03 ENCOUNTER — LAB REQUISITION (OUTPATIENT)
Dept: LAB | Facility: HOSPITAL | Age: 55
End: 2025-06-03
Payer: MEDICARE

## 2025-06-03 DIAGNOSIS — Z79.01 LONG TERM (CURRENT) USE OF ANTICOAGULANTS: ICD-10-CM

## 2025-06-03 LAB
INR PPP: 4 (ref 0.9–1.1)
PROTHROMBIN TIME: 44 SECONDS (ref 9.8–12.4)

## 2025-06-03 PROCEDURE — 85610 PROTHROMBIN TIME: CPT | Mod: OUT | Performed by: INTERNAL MEDICINE

## 2025-06-03 PROCEDURE — 36415 COLL VENOUS BLD VENIPUNCTURE: CPT | Mod: OUT | Performed by: INTERNAL MEDICINE

## 2025-06-05 ENCOUNTER — LAB REQUISITION (OUTPATIENT)
Dept: LAB | Facility: HOSPITAL | Age: 55
End: 2025-06-05
Payer: MEDICARE

## 2025-06-05 DIAGNOSIS — Z79.01 LONG TERM (CURRENT) USE OF ANTICOAGULANTS: ICD-10-CM

## 2025-06-05 DIAGNOSIS — N18.9 CHRONIC KIDNEY DISEASE, UNSPECIFIED: ICD-10-CM

## 2025-06-05 LAB
INR PPP: 2.6 (ref 0.9–1.1)
PROTHROMBIN TIME: 28.6 SECONDS (ref 9.8–12.4)

## 2025-06-05 PROCEDURE — 36415 COLL VENOUS BLD VENIPUNCTURE: CPT | Mod: OUT | Performed by: INTERNAL MEDICINE

## 2025-06-05 PROCEDURE — 85610 PROTHROMBIN TIME: CPT | Mod: OUT | Performed by: INTERNAL MEDICINE

## 2025-06-09 ENCOUNTER — LAB REQUISITION (OUTPATIENT)
Dept: LAB | Facility: HOSPITAL | Age: 55
End: 2025-06-09
Payer: MEDICARE

## 2025-06-09 DIAGNOSIS — Z79.01 LONG TERM (CURRENT) USE OF ANTICOAGULANTS: ICD-10-CM

## 2025-06-09 LAB
INR PPP: 2.7 (ref 0.9–1.1)
PROTHROMBIN TIME: 29.5 SECONDS (ref 9.8–12.4)

## 2025-06-09 PROCEDURE — 85610 PROTHROMBIN TIME: CPT | Mod: OUT | Performed by: INTERNAL MEDICINE

## 2025-06-09 PROCEDURE — 36415 COLL VENOUS BLD VENIPUNCTURE: CPT | Mod: OUT | Performed by: INTERNAL MEDICINE

## 2025-06-12 ENCOUNTER — LAB REQUISITION (OUTPATIENT)
Dept: LAB | Facility: HOSPITAL | Age: 55
End: 2025-06-12
Payer: MEDICARE

## 2025-06-12 DIAGNOSIS — Z79.01 LONG TERM (CURRENT) USE OF ANTICOAGULANTS: ICD-10-CM

## 2025-06-12 LAB
INR PPP: 3.9 (ref 0.9–1.1)
PROTHROMBIN TIME: 43.4 SECONDS (ref 9.8–12.4)

## 2025-06-12 PROCEDURE — 36415 COLL VENOUS BLD VENIPUNCTURE: CPT | Mod: OUT | Performed by: INTERNAL MEDICINE

## 2025-06-12 PROCEDURE — 85610 PROTHROMBIN TIME: CPT | Mod: OUT | Performed by: INTERNAL MEDICINE

## 2025-06-16 ENCOUNTER — LAB REQUISITION (OUTPATIENT)
Dept: LAB | Facility: HOSPITAL | Age: 55
End: 2025-06-16
Payer: MEDICARE

## 2025-06-16 ENCOUNTER — HOSPITAL ENCOUNTER (OUTPATIENT)
Dept: DIALYSIS | Facility: HOSPITAL | Age: 55
Setting detail: DIALYSIS SERIES
Discharge: HOME | End: 2025-06-16
Payer: MEDICARE

## 2025-06-16 VITALS
HEART RATE: 82 BPM | SYSTOLIC BLOOD PRESSURE: 103 MMHG | DIASTOLIC BLOOD PRESSURE: 69 MMHG | BODY MASS INDEX: 35.61 KG/M2 | WEIGHT: 182.32 LBS

## 2025-06-16 DIAGNOSIS — N18.9 CHRONIC KIDNEY DISEASE, UNSPECIFIED: ICD-10-CM

## 2025-06-16 DIAGNOSIS — N18.4 ANEMIA DUE TO STAGE 4 CHRONIC KIDNEY DISEASE: Primary | ICD-10-CM

## 2025-06-16 DIAGNOSIS — N18.4 CHRONIC KIDNEY DISEASE, STAGE 4 (SEVERE) (MULTI): ICD-10-CM

## 2025-06-16 DIAGNOSIS — D63.1 ANEMIA DUE TO STAGE 4 CHRONIC KIDNEY DISEASE: Primary | ICD-10-CM

## 2025-06-16 DIAGNOSIS — Z79.01 LONG TERM (CURRENT) USE OF ANTICOAGULANTS: ICD-10-CM

## 2025-06-16 DIAGNOSIS — E61.1 IRON DEFICIENCY: ICD-10-CM

## 2025-06-16 LAB
ALBUMIN SERPL BCP-MCNC: 4 G/DL (ref 3.4–5)
ANION GAP SERPL CALC-SCNC: 13 MMOL/L (ref 10–20)
BUN SERPL-MCNC: 34 MG/DL (ref 6–23)
CALCIUM SERPL-MCNC: 9.1 MG/DL (ref 8.6–10.6)
CHLORIDE SERPL-SCNC: 103 MMOL/L (ref 98–107)
CO2 SERPL-SCNC: 28 MMOL/L (ref 21–32)
CREAT SERPL-MCNC: 1.73 MG/DL (ref 0.5–1.05)
EGFRCR SERPLBLD CKD-EPI 2021: 35 ML/MIN/1.73M*2
ERYTHROCYTE [DISTWIDTH] IN BLOOD BY AUTOMATED COUNT: 14.7 % (ref 11.5–14.5)
GLUCOSE SERPL-MCNC: 85 MG/DL (ref 74–99)
HCT VFR BLD AUTO: 33 % (ref 36–46)
HGB BLD-MCNC: 10.6 G/DL (ref 12–16)
INR PPP: 1.5 (ref 0.9–1.1)
MCH RBC QN AUTO: 29.9 PG (ref 26–34)
MCHC RBC AUTO-ENTMCNC: 32.1 G/DL (ref 32–36)
MCV RBC AUTO: 93 FL (ref 80–100)
NRBC BLD-RTO: 0 /100 WBCS (ref 0–0)
PHOSPHATE SERPL-MCNC: 4.4 MG/DL (ref 2.5–4.9)
PLATELET # BLD AUTO: 168 X10*3/UL (ref 150–450)
POTASSIUM SERPL-SCNC: 3.9 MMOL/L (ref 3.5–5.3)
PROTHROMBIN TIME: 16.9 SECONDS (ref 9.8–12.4)
RBC # BLD AUTO: 3.55 X10*6/UL (ref 4–5.2)
SODIUM SERPL-SCNC: 140 MMOL/L (ref 136–145)
WBC # BLD AUTO: 7.3 X10*3/UL (ref 4.4–11.3)

## 2025-06-16 PROCEDURE — 36415 COLL VENOUS BLD VENIPUNCTURE: CPT | Performed by: NURSE PRACTITIONER

## 2025-06-16 PROCEDURE — 36416 COLLJ CAPILLARY BLOOD SPEC: CPT | Mod: OUT | Performed by: INTERNAL MEDICINE

## 2025-06-16 PROCEDURE — 2500000004 HC RX 250 GENERAL PHARMACY W/ HCPCS (ALT 636 FOR OP/ED)

## 2025-06-16 PROCEDURE — 80069 RENAL FUNCTION PANEL: CPT | Performed by: NURSE PRACTITIONER

## 2025-06-16 PROCEDURE — 36415 COLL VENOUS BLD VENIPUNCTURE: CPT

## 2025-06-16 PROCEDURE — 96374 THER/PROPH/DIAG INJ IV PUSH: CPT

## 2025-06-16 PROCEDURE — 36000 PLACE NEEDLE IN VEIN: CPT

## 2025-06-16 PROCEDURE — 85610 PROTHROMBIN TIME: CPT | Mod: OUT | Performed by: INTERNAL MEDICINE

## 2025-06-16 PROCEDURE — 96375 TX/PRO/DX INJ NEW DRUG ADDON: CPT

## 2025-06-16 PROCEDURE — 85027 COMPLETE CBC AUTOMATED: CPT | Performed by: INTERNAL MEDICINE

## 2025-06-16 RX ORDER — EPINEPHRINE 0.3 MG/.3ML
0.3 INJECTION SUBCUTANEOUS EVERY 5 MIN PRN
OUTPATIENT
Start: 2025-06-18

## 2025-06-16 RX ORDER — FAMOTIDINE 10 MG/ML
20 INJECTION, SOLUTION INTRAVENOUS ONCE AS NEEDED
Status: DISCONTINUED | OUTPATIENT
Start: 2025-06-16 | End: 2025-06-17 | Stop reason: HOSPADM

## 2025-06-16 RX ORDER — ALBUTEROL SULFATE 0.83 MG/ML
3 SOLUTION RESPIRATORY (INHALATION) AS NEEDED
OUTPATIENT
Start: 2025-06-18

## 2025-06-16 RX ORDER — ALBUTEROL SULFATE 0.83 MG/ML
3 SOLUTION RESPIRATORY (INHALATION) AS NEEDED
OUTPATIENT
Start: 2025-07-14

## 2025-06-16 RX ORDER — FAMOTIDINE 10 MG/ML
20 INJECTION, SOLUTION INTRAVENOUS ONCE AS NEEDED
OUTPATIENT
Start: 2025-06-18

## 2025-06-16 RX ORDER — TRAZODONE HYDROCHLORIDE 100 MG/1
125 TABLET ORAL NIGHTLY
COMMUNITY

## 2025-06-16 RX ORDER — FAMOTIDINE 10 MG/ML
20 INJECTION, SOLUTION INTRAVENOUS ONCE AS NEEDED
OUTPATIENT
Start: 2025-07-14

## 2025-06-16 RX ORDER — EPINEPHRINE 0.3 MG/.3ML
0.3 INJECTION SUBCUTANEOUS EVERY 5 MIN PRN
Status: DISCONTINUED | OUTPATIENT
Start: 2025-06-16 | End: 2025-06-17 | Stop reason: HOSPADM

## 2025-06-16 RX ORDER — EPINEPHRINE 1 MG/ML
0.3 INJECTION, SOLUTION, CONCENTRATE INTRAVENOUS EVERY 5 MIN PRN
OUTPATIENT
Start: 2025-07-14

## 2025-06-16 RX ORDER — ALBUTEROL SULFATE 0.83 MG/ML
3 SOLUTION RESPIRATORY (INHALATION) AS NEEDED
Status: DISCONTINUED | OUTPATIENT
Start: 2025-06-16 | End: 2025-06-17 | Stop reason: HOSPADM

## 2025-06-16 RX ADMIN — IRON SUCROSE 100 MG: 20 INJECTION, SOLUTION INTRAVENOUS at 13:39

## 2025-06-16 RX ADMIN — DARBEPOETIN ALFA 100 MCG: 100 INJECTION, SOLUTION INTRAVENOUS; SUBCUTANEOUS at 13:39

## 2025-06-16 RX ADMIN — DARBEPOETIN ALFA 25 MCG: 25 INJECTION, SOLUTION INTRAVENOUS; SUBCUTANEOUS at 13:38

## 2025-06-16 ASSESSMENT — PAIN SCALES - GENERAL: PAINLEVEL_OUTOF10: 2

## 2025-06-16 NOTE — PROGRESS NOTES
Name: Janene Pinedo  MRN: [id number]  : 1970      Reason for Visit: Injections (Aranesp 125mcg//100mg)      Vitals:    25 1315   BP: 103/69   BP Location: Left arm   Patient Position: Sitting   BP Cuff Size: Adult   Pulse: 82   Weight: 82.7 kg (182 lb 5.1 oz)        How is your energy? Fair, about the same since the last visit.   How is your appetite? Fair. Some days she eats well, other days not at all.   Any nausea and vomiting after eating? No  Any diarrhea or constipation? No  Any SOB? No  Any Edema in lower extremities? No  Have you had any recent infections? No  Have you been on antibiotics recently? No  Any questions regarding your Kidney disease? No  DO you have a Dialysis accesses? NO    Hemoglobin   Date/Time Value Ref Range Status   2025 10:32 AM 10.7 (L) 12.0 - 16.0 g/dL Final   2025 11:13 AM 9.8 (L) 12.0 - 16.0 g/dL Final     HEMOGLOBIN   Date/Time Value Ref Range Status   04/15/2025 10:59 AM 10.4 (L) 11.7 - 15.5 g/dL Final     Ferritin   Date/Time Value Ref Range Status   2025 10:32  (H) 8 - 150 ng/mL Final     % Saturation   Date/Time Value Ref Range Status   2025 10:32 AM 21 (L) 25 - 45 % Final        Medications Given in Clinic: Aranesp 125 mcg and Venofer 100mg.      Nursing notes:  Patient assessment completed and vitals obtained. CBC and RFP drawn as ordered. Aranesp 125 mcg and Venofer 100mg administered, and patient tolerated well.       RN: MARY SMITH RN     25  1:22 PM

## 2025-06-19 ENCOUNTER — LAB REQUISITION (OUTPATIENT)
Dept: LAB | Facility: HOSPITAL | Age: 55
End: 2025-06-19
Payer: MEDICARE

## 2025-06-19 DIAGNOSIS — N18.9 CHRONIC KIDNEY DISEASE, UNSPECIFIED: ICD-10-CM

## 2025-06-19 LAB
INR PPP: 2.3 (ref 0.9–1.1)
PROTHROMBIN TIME: 26 SECONDS (ref 9.8–12.4)

## 2025-06-19 PROCEDURE — 36415 COLL VENOUS BLD VENIPUNCTURE: CPT | Mod: OUT | Performed by: INTERNAL MEDICINE

## 2025-06-19 PROCEDURE — 85610 PROTHROMBIN TIME: CPT | Mod: OUT | Performed by: INTERNAL MEDICINE

## 2025-06-23 ENCOUNTER — LAB REQUISITION (OUTPATIENT)
Dept: LAB | Facility: HOSPITAL | Age: 55
End: 2025-06-23
Payer: MEDICARE

## 2025-06-23 DIAGNOSIS — Z79.01 LONG TERM (CURRENT) USE OF ANTICOAGULANTS: ICD-10-CM

## 2025-06-23 LAB
INR PPP: 2.6 (ref 0.9–1.1)
PROTHROMBIN TIME: 28.6 SECONDS (ref 9.8–12.4)

## 2025-06-23 PROCEDURE — 36415 COLL VENOUS BLD VENIPUNCTURE: CPT | Mod: OUT | Performed by: INTERNAL MEDICINE

## 2025-06-23 PROCEDURE — 85610 PROTHROMBIN TIME: CPT | Mod: OUT | Performed by: INTERNAL MEDICINE

## 2025-06-25 ENCOUNTER — PATIENT OUTREACH (OUTPATIENT)
Dept: CARE COORDINATION | Facility: CLINIC | Age: 55
End: 2025-06-25
Payer: MEDICARE

## 2025-06-25 DIAGNOSIS — Z12.31 ENCOUNTER FOR SCREENING MAMMOGRAM FOR BREAST CANCER: ICD-10-CM

## 2025-06-26 ENCOUNTER — LAB REQUISITION (OUTPATIENT)
Dept: LAB | Facility: HOSPITAL | Age: 55
End: 2025-06-26
Payer: MEDICARE

## 2025-06-26 DIAGNOSIS — N18.9 CHRONIC KIDNEY DISEASE, UNSPECIFIED: ICD-10-CM

## 2025-06-26 DIAGNOSIS — Z79.01 LONG TERM (CURRENT) USE OF ANTICOAGULANTS: ICD-10-CM

## 2025-06-26 LAB
INR PPP: 3.5 (ref 0.9–1.1)
PROTHROMBIN TIME: 39.2 SECONDS (ref 9.8–12.4)

## 2025-06-26 PROCEDURE — 36415 COLL VENOUS BLD VENIPUNCTURE: CPT | Mod: OUT | Performed by: INTERNAL MEDICINE

## 2025-06-26 PROCEDURE — 85610 PROTHROMBIN TIME: CPT | Mod: OUT | Performed by: INTERNAL MEDICINE

## 2025-06-30 ENCOUNTER — LAB REQUISITION (OUTPATIENT)
Dept: LAB | Facility: HOSPITAL | Age: 55
End: 2025-06-30
Payer: MEDICARE

## 2025-06-30 DIAGNOSIS — Z79.01 LONG TERM (CURRENT) USE OF ANTICOAGULANTS: ICD-10-CM

## 2025-06-30 LAB
INR PPP: 4.4 (ref 0.9–1.1)
PROTHROMBIN TIME: 48.9 SECONDS (ref 9.8–12.4)

## 2025-06-30 PROCEDURE — 36415 COLL VENOUS BLD VENIPUNCTURE: CPT | Mod: OUT | Performed by: INTERNAL MEDICINE

## 2025-06-30 PROCEDURE — 85610 PROTHROMBIN TIME: CPT | Mod: OUT | Performed by: INTERNAL MEDICINE

## 2025-07-02 ENCOUNTER — LAB REQUISITION (OUTPATIENT)
Dept: LAB | Facility: HOSPITAL | Age: 55
End: 2025-07-02
Payer: MEDICARE

## 2025-07-02 DIAGNOSIS — Z79.01 LONG TERM (CURRENT) USE OF ANTICOAGULANTS: ICD-10-CM

## 2025-07-02 LAB
INR PPP: 2.9 (ref 0.9–1.1)
PROTHROMBIN TIME: 32.5 SECONDS (ref 9.8–12.4)

## 2025-07-02 PROCEDURE — 85610 PROTHROMBIN TIME: CPT | Mod: OUT | Performed by: INTERNAL MEDICINE

## 2025-07-02 PROCEDURE — 36415 COLL VENOUS BLD VENIPUNCTURE: CPT | Mod: OUT | Performed by: INTERNAL MEDICINE

## 2025-07-07 ENCOUNTER — LAB REQUISITION (OUTPATIENT)
Dept: LAB | Facility: HOSPITAL | Age: 55
End: 2025-07-07
Payer: MEDICARE

## 2025-07-07 DIAGNOSIS — Z79.01 LONG TERM (CURRENT) USE OF ANTICOAGULANTS: ICD-10-CM

## 2025-07-07 LAB
INR PPP: 3.2 (ref 0.9–1.1)
PROTHROMBIN TIME: 35.1 SECONDS (ref 9.8–12.4)

## 2025-07-07 PROCEDURE — 36415 COLL VENOUS BLD VENIPUNCTURE: CPT | Mod: OUT | Performed by: INTERNAL MEDICINE

## 2025-07-07 PROCEDURE — 85610 PROTHROMBIN TIME: CPT | Mod: OUT | Performed by: INTERNAL MEDICINE

## 2025-07-10 ENCOUNTER — LAB REQUISITION (OUTPATIENT)
Dept: LAB | Facility: HOSPITAL | Age: 55
End: 2025-07-10
Payer: MEDICARE

## 2025-07-10 DIAGNOSIS — Z79.01 LONG TERM (CURRENT) USE OF ANTICOAGULANTS: ICD-10-CM

## 2025-07-10 LAB
INR PPP: 2.2 (ref 0.9–1.1)
PROTHROMBIN TIME: 24 SECONDS (ref 9.8–12.4)

## 2025-07-10 PROCEDURE — 36415 COLL VENOUS BLD VENIPUNCTURE: CPT | Mod: OUT | Performed by: INTERNAL MEDICINE

## 2025-07-10 PROCEDURE — 85610 PROTHROMBIN TIME: CPT | Mod: OUT | Performed by: INTERNAL MEDICINE

## 2025-07-14 ENCOUNTER — LAB REQUISITION (OUTPATIENT)
Dept: LAB | Facility: HOSPITAL | Age: 55
End: 2025-07-14
Payer: MEDICARE

## 2025-07-14 DIAGNOSIS — N17.9 ACUTE KIDNEY FAILURE, UNSPECIFIED: ICD-10-CM

## 2025-07-14 DIAGNOSIS — Z79.01 LONG TERM (CURRENT) USE OF ANTICOAGULANTS: ICD-10-CM

## 2025-07-14 LAB
INR PPP: 2.7 (ref 0.9–1.1)
PROTHROMBIN TIME: 29.5 SECONDS (ref 9.8–12.4)

## 2025-07-14 PROCEDURE — 36415 COLL VENOUS BLD VENIPUNCTURE: CPT | Mod: OUT | Performed by: INTERNAL MEDICINE

## 2025-07-14 PROCEDURE — 85610 PROTHROMBIN TIME: CPT | Mod: OUT | Performed by: INTERNAL MEDICINE

## 2025-07-15 DIAGNOSIS — N18.32 HYPERTENSIVE KIDNEY DISEASE WITH STAGE 3B CHRONIC KIDNEY DISEASE (MULTI): ICD-10-CM

## 2025-07-15 DIAGNOSIS — N05.2 MEMBRANOUS GLOMERULONEPHRITIS: ICD-10-CM

## 2025-07-15 DIAGNOSIS — I12.9 HYPERTENSIVE KIDNEY DISEASE WITH STAGE 3B CHRONIC KIDNEY DISEASE (MULTI): ICD-10-CM

## 2025-07-16 ENCOUNTER — APPOINTMENT (OUTPATIENT)
Dept: DIALYSIS | Facility: HOSPITAL | Age: 55
End: 2025-07-16
Payer: MEDICARE

## 2025-07-16 ENCOUNTER — HOSPITAL ENCOUNTER (OUTPATIENT)
Dept: DIALYSIS | Facility: HOSPITAL | Age: 55
Setting detail: DIALYSIS SERIES
Discharge: HOME | End: 2025-07-16
Payer: MEDICARE

## 2025-07-16 VITALS
HEART RATE: 79 BPM | WEIGHT: 179.9 LBS | BODY MASS INDEX: 35.13 KG/M2 | DIASTOLIC BLOOD PRESSURE: 69 MMHG | SYSTOLIC BLOOD PRESSURE: 104 MMHG

## 2025-07-16 DIAGNOSIS — N18.4 ANEMIA DUE TO STAGE 4 CHRONIC KIDNEY DISEASE: ICD-10-CM

## 2025-07-16 DIAGNOSIS — N18.4 CHRONIC KIDNEY DISEASE, STAGE 4 (SEVERE) (MULTI): Primary | ICD-10-CM

## 2025-07-16 DIAGNOSIS — E61.1 IRON DEFICIENCY: ICD-10-CM

## 2025-07-16 DIAGNOSIS — D63.1 ANEMIA DUE TO STAGE 4 CHRONIC KIDNEY DISEASE: ICD-10-CM

## 2025-07-16 LAB
ERYTHROCYTE [DISTWIDTH] IN BLOOD BY AUTOMATED COUNT: 14.6 % (ref 11.5–14.5)
HCT VFR BLD AUTO: 33.6 % (ref 36–46)
HGB BLD-MCNC: 10.8 G/DL (ref 12–16)
MCH RBC QN AUTO: 30.1 PG (ref 26–34)
MCHC RBC AUTO-ENTMCNC: 32.1 G/DL (ref 32–36)
MCV RBC AUTO: 94 FL (ref 80–100)
NRBC BLD-RTO: 0 /100 WBCS (ref 0–0)
PLATELET # BLD AUTO: 153 X10*3/UL (ref 150–450)
RBC # BLD AUTO: 3.59 X10*6/UL (ref 4–5.2)
WBC # BLD AUTO: 8.1 X10*3/UL (ref 4.4–11.3)

## 2025-07-16 PROCEDURE — 2500000004 HC RX 250 GENERAL PHARMACY W/ HCPCS (ALT 636 FOR OP/ED)

## 2025-07-16 PROCEDURE — 36415 COLL VENOUS BLD VENIPUNCTURE: CPT | Performed by: INTERNAL MEDICINE

## 2025-07-16 PROCEDURE — 36415 COLL VENOUS BLD VENIPUNCTURE: CPT

## 2025-07-16 PROCEDURE — 96375 TX/PRO/DX INJ NEW DRUG ADDON: CPT

## 2025-07-16 PROCEDURE — 85027 COMPLETE CBC AUTOMATED: CPT | Performed by: INTERNAL MEDICINE

## 2025-07-16 RX ORDER — EPINEPHRINE 1 MG/ML
0.3 INJECTION, SOLUTION, CONCENTRATE INTRAVENOUS EVERY 5 MIN PRN
OUTPATIENT
Start: 2025-08-11

## 2025-07-16 RX ORDER — ALBUTEROL SULFATE 0.83 MG/ML
3 SOLUTION RESPIRATORY (INHALATION) AS NEEDED
Status: DISCONTINUED | OUTPATIENT
Start: 2025-07-16 | End: 2025-07-17 | Stop reason: HOSPADM

## 2025-07-16 RX ORDER — ALBUTEROL SULFATE 0.83 MG/ML
3 SOLUTION RESPIRATORY (INHALATION) AS NEEDED
OUTPATIENT
Start: 2025-08-11

## 2025-07-16 RX ORDER — EPINEPHRINE 0.3 MG/.3ML
0.3 INJECTION SUBCUTANEOUS EVERY 5 MIN PRN
Status: DISCONTINUED | OUTPATIENT
Start: 2025-07-16 | End: 2025-07-17 | Stop reason: HOSPADM

## 2025-07-16 RX ORDER — FAMOTIDINE 10 MG/ML
20 INJECTION, SOLUTION INTRAVENOUS ONCE AS NEEDED
OUTPATIENT
Start: 2025-08-11

## 2025-07-16 RX ORDER — FAMOTIDINE 10 MG/ML
20 INJECTION, SOLUTION INTRAVENOUS ONCE AS NEEDED
Status: DISCONTINUED | OUTPATIENT
Start: 2025-07-16 | End: 2025-07-17 | Stop reason: HOSPADM

## 2025-07-16 RX ORDER — EPINEPHRINE 0.3 MG/.3ML
0.3 INJECTION SUBCUTANEOUS EVERY 5 MIN PRN
OUTPATIENT
Start: 2025-08-11

## 2025-07-16 RX ADMIN — DARBEPOETIN ALFA 100 MCG: 100 INJECTION, SOLUTION INTRAVENOUS; SUBCUTANEOUS at 13:40

## 2025-07-16 RX ADMIN — IRON SUCROSE 100 MG: 20 INJECTION, SOLUTION INTRAVENOUS at 13:40

## 2025-07-16 RX ADMIN — DARBEPOETIN ALFA 25 MCG: 25 INJECTION, SOLUTION INTRAVENOUS; SUBCUTANEOUS at 13:39

## 2025-07-16 NOTE — PROGRESS NOTES
Name: Janene Pinedo   : 1970   MRN: [id number]     Reason for Visit: iron, aranesp, labs      Vitals:    25 1313   Weight: 81.6 kg (179 lb 14.3 oz)    Visit Vitals  /69 (BP Location: Left arm, Patient Position: Sitting)   Pulse 79        How is your energy? fair  How is your appetite? Small breakfast lunch hamburger dinner soup an salad  Any nausea and vomiting after eating? no  Any diarrhea or constipation? no  Any SOB? no  Any Edema in lower extremities? no  Have you had any recent infections? no  Have you been on antibiotics recently? no  Any questions regarding your Kidney disease? no  DO you have a Dialysis accesses? 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  Overall feeling good.      RN: Nila Marina RN     25

## 2025-07-17 ENCOUNTER — LAB REQUISITION (OUTPATIENT)
Dept: LAB | Facility: HOSPITAL | Age: 55
End: 2025-07-17
Payer: MEDICARE

## 2025-07-17 DIAGNOSIS — Z79.01 LONG TERM (CURRENT) USE OF ANTICOAGULANTS: ICD-10-CM

## 2025-07-17 LAB
INR PPP: 1.8 (ref 0.9–1.1)
PROTHROMBIN TIME: 19.7 SECONDS (ref 9.8–12.4)

## 2025-07-17 PROCEDURE — 36415 COLL VENOUS BLD VENIPUNCTURE: CPT | Mod: OUT | Performed by: INTERNAL MEDICINE

## 2025-07-17 PROCEDURE — 85610 PROTHROMBIN TIME: CPT | Mod: OUT | Performed by: INTERNAL MEDICINE

## 2025-07-21 ENCOUNTER — LAB REQUISITION (OUTPATIENT)
Dept: LAB | Facility: HOSPITAL | Age: 55
End: 2025-07-21
Payer: MEDICARE

## 2025-07-21 DIAGNOSIS — Z79.01 LONG TERM (CURRENT) USE OF ANTICOAGULANTS: ICD-10-CM

## 2025-07-21 LAB
INR PPP: 3.4 (ref 0.9–1.1)
PROTHROMBIN TIME: 38.1 SECONDS (ref 9.8–12.4)

## 2025-07-21 PROCEDURE — 36415 COLL VENOUS BLD VENIPUNCTURE: CPT | Mod: OUT | Performed by: INTERNAL MEDICINE

## 2025-07-21 PROCEDURE — 85610 PROTHROMBIN TIME: CPT | Mod: OUT | Performed by: INTERNAL MEDICINE

## 2025-07-24 ENCOUNTER — LAB REQUISITION (OUTPATIENT)
Dept: LAB | Facility: HOSPITAL | Age: 55
End: 2025-07-24
Payer: MEDICARE

## 2025-07-24 DIAGNOSIS — Z79.01 LONG TERM (CURRENT) USE OF ANTICOAGULANTS: ICD-10-CM

## 2025-07-24 DIAGNOSIS — N18.9 CHRONIC KIDNEY DISEASE, UNSPECIFIED: ICD-10-CM

## 2025-07-24 LAB
INR PPP: 1.6 (ref 0.9–1.1)
PROTHROMBIN TIME: 17.7 SECONDS (ref 9.8–12.4)

## 2025-07-24 PROCEDURE — 36415 COLL VENOUS BLD VENIPUNCTURE: CPT | Mod: OUT | Performed by: INTERNAL MEDICINE

## 2025-07-24 PROCEDURE — 85610 PROTHROMBIN TIME: CPT | Mod: OUT | Performed by: INTERNAL MEDICINE

## 2025-07-27 LAB
ALBUMIN SERPL-MCNC: 4 G/DL (ref 3.6–5.1)
ALBUMIN/CREAT UR: NORMAL
APPEARANCE UR: CLEAR
BACTERIA #/AREA URNS HPF: ABNORMAL /HPF
BILIRUB UR QL STRIP: NEGATIVE
BUN SERPL-MCNC: 28 MG/DL (ref 7–25)
BUN/CREAT SERPL: 18 (CALC) (ref 6–22)
CALCIUM SERPL-MCNC: 8.7 MG/DL (ref 8.6–10.4)
CHLORIDE SERPL-SCNC: 105 MMOL/L (ref 98–110)
CO2 SERPL-SCNC: 28 MMOL/L (ref 20–32)
COLOR UR: YELLOW
CREAT SERPL-MCNC: 1.52 MG/DL (ref 0.5–1.03)
CREAT UR-MCNC: NORMAL MG/DL
CREAT UR-MCNC: NORMAL MG/DL
EGFRCR SERPLBLD CKD-EPI 2021: 41 ML/MIN/1.73M2
ERYTHROCYTE [DISTWIDTH] IN BLOOD BY AUTOMATED COUNT: 15.6 % (ref 11–15)
GLUCOSE SERPL-MCNC: 86 MG/DL (ref 65–99)
GLUCOSE UR QL STRIP: NEGATIVE
HCT VFR BLD AUTO: 35.9 % (ref 35–45)
HGB BLD-MCNC: 11.4 G/DL (ref 11.7–15.5)
HGB UR QL STRIP: NEGATIVE
HYALINE CASTS #/AREA URNS LPF: ABNORMAL /LPF
KETONES UR QL STRIP: NEGATIVE
LEUKOCYTE ESTERASE UR QL STRIP: ABNORMAL
MCH RBC QN AUTO: 30.4 PG (ref 27–33)
MCHC RBC AUTO-ENTMCNC: 31.8 G/DL (ref 32–36)
MCV RBC AUTO: 95.7 FL (ref 80–100)
MICROALBUMIN UR-MCNC: NORMAL
NITRITE UR QL STRIP: NEGATIVE
PH UR STRIP: 7.5 [PH] (ref 5–8)
PHOSPHATE SERPL-MCNC: 3.2 MG/DL (ref 2.5–4.5)
PLATELET # BLD AUTO: 167 THOUSAND/UL (ref 140–400)
PMV BLD REES-ECKER: 10.2 FL (ref 7.5–12.5)
POTASSIUM SERPL-SCNC: 3.7 MMOL/L (ref 3.5–5.3)
PROT UR QL STRIP: NEGATIVE
PROT UR-MCNC: NORMAL G/DL
PROT/CREAT UR: NORMAL MG/G{CREAT}
PROT/CREAT UR: NORMAL MG/G{CREAT}
RBC # BLD AUTO: 3.75 MILLION/UL (ref 3.8–5.1)
RBC #/AREA URNS HPF: ABNORMAL /HPF
SERVICE CMNT-IMP: ABNORMAL
SODIUM SERPL-SCNC: 140 MMOL/L (ref 135–146)
SP GR UR STRIP: 1.01 (ref 1–1.03)
SQUAMOUS #/AREA URNS HPF: ABNORMAL /HPF
WBC # BLD AUTO: 7.2 THOUSAND/UL (ref 3.8–10.8)
WBC #/AREA URNS HPF: ABNORMAL /HPF

## 2025-07-28 ENCOUNTER — LAB REQUISITION (OUTPATIENT)
Dept: LAB | Facility: HOSPITAL | Age: 55
End: 2025-07-28
Payer: MEDICARE

## 2025-07-28 DIAGNOSIS — N18.9 CHRONIC KIDNEY DISEASE, UNSPECIFIED: ICD-10-CM

## 2025-07-28 DIAGNOSIS — Z79.01 LONG TERM (CURRENT) USE OF ANTICOAGULANTS: ICD-10-CM

## 2025-07-28 LAB
ALBUMIN SERPL-MCNC: 4 G/DL (ref 3.6–5.1)
ALBUMIN/CREAT UR: 67 MG/G CREAT
APPEARANCE UR: CLEAR
BACTERIA #/AREA URNS HPF: ABNORMAL /HPF
BILIRUB UR QL STRIP: NEGATIVE
BUN SERPL-MCNC: 28 MG/DL (ref 7–25)
BUN/CREAT SERPL: 18 (CALC) (ref 6–22)
CALCIUM SERPL-MCNC: 8.7 MG/DL (ref 8.6–10.4)
CHLORIDE SERPL-SCNC: 105 MMOL/L (ref 98–110)
CO2 SERPL-SCNC: 28 MMOL/L (ref 20–32)
COLOR UR: YELLOW
CREAT SERPL-MCNC: 1.52 MG/DL (ref 0.5–1.03)
CREAT UR-MCNC: 30 MG/DL (ref 20–275)
CREAT UR-MCNC: 30 MG/DL (ref 20–275)
EGFRCR SERPLBLD CKD-EPI 2021: 41 ML/MIN/1.73M2
ERYTHROCYTE [DISTWIDTH] IN BLOOD BY AUTOMATED COUNT: 15.6 % (ref 11–15)
GLUCOSE SERPL-MCNC: 86 MG/DL (ref 65–99)
GLUCOSE UR QL STRIP: NEGATIVE
HCT VFR BLD AUTO: 35.9 % (ref 35–45)
HGB BLD-MCNC: 11.4 G/DL (ref 11.7–15.5)
HGB UR QL STRIP: NEGATIVE
HYALINE CASTS #/AREA URNS LPF: ABNORMAL /LPF
INR PPP: 1.9 (ref 0.9–1.1)
KETONES UR QL STRIP: NEGATIVE
LEUKOCYTE ESTERASE UR QL STRIP: ABNORMAL
MCH RBC QN AUTO: 30.4 PG (ref 27–33)
MCHC RBC AUTO-ENTMCNC: 31.8 G/DL (ref 32–36)
MCV RBC AUTO: 95.7 FL (ref 80–100)
MICROALBUMIN UR-MCNC: 2 MG/DL
NITRITE UR QL STRIP: NEGATIVE
PH UR STRIP: 7.5 [PH] (ref 5–8)
PHOSPHATE SERPL-MCNC: 3.2 MG/DL (ref 2.5–4.5)
PLATELET # BLD AUTO: 167 THOUSAND/UL (ref 140–400)
PMV BLD REES-ECKER: 10.2 FL (ref 7.5–12.5)
POTASSIUM SERPL-SCNC: 3.7 MMOL/L (ref 3.5–5.3)
PROT UR QL STRIP: NEGATIVE
PROT UR-MCNC: 10 MG/DL (ref 5–24)
PROT/CREAT UR: 0.33 MG/MG CREAT (ref 0.02–0.18)
PROT/CREAT UR: 333 MG/G CREAT (ref 24–184)
PROTHROMBIN TIME: 21.2 SECONDS (ref 9.8–12.4)
RBC # BLD AUTO: 3.75 MILLION/UL (ref 3.8–5.1)
RBC #/AREA URNS HPF: ABNORMAL /HPF
SERVICE CMNT-IMP: ABNORMAL
SODIUM SERPL-SCNC: 140 MMOL/L (ref 135–146)
SP GR UR STRIP: 1.01 (ref 1–1.03)
SQUAMOUS #/AREA URNS HPF: ABNORMAL /HPF
WBC # BLD AUTO: 7.2 THOUSAND/UL (ref 3.8–10.8)
WBC #/AREA URNS HPF: ABNORMAL /HPF

## 2025-07-28 PROCEDURE — 36415 COLL VENOUS BLD VENIPUNCTURE: CPT | Mod: OUT | Performed by: INTERNAL MEDICINE

## 2025-07-28 PROCEDURE — 85610 PROTHROMBIN TIME: CPT | Mod: OUT | Performed by: INTERNAL MEDICINE

## 2025-07-29 ENCOUNTER — APPOINTMENT (OUTPATIENT)
Dept: NEPHROLOGY | Facility: CLINIC | Age: 55
End: 2025-07-29
Payer: MEDICARE

## 2025-07-29 VITALS — OXYGEN SATURATION: 95 % | DIASTOLIC BLOOD PRESSURE: 73 MMHG | HEART RATE: 82 BPM | SYSTOLIC BLOOD PRESSURE: 113 MMHG

## 2025-07-29 DIAGNOSIS — I12.9 HYPERTENSIVE KIDNEY DISEASE WITH STAGE 3B CHRONIC KIDNEY DISEASE (MULTI): Primary | ICD-10-CM

## 2025-07-29 DIAGNOSIS — N18.32 HYPERTENSIVE KIDNEY DISEASE WITH STAGE 3B CHRONIC KIDNEY DISEASE (MULTI): Primary | ICD-10-CM

## 2025-07-29 PROCEDURE — 3074F SYST BP LT 130 MM HG: CPT | Performed by: INTERNAL MEDICINE

## 2025-07-29 PROCEDURE — 99213 OFFICE O/P EST LOW 20 MIN: CPT | Performed by: INTERNAL MEDICINE

## 2025-07-29 PROCEDURE — 3078F DIAST BP <80 MM HG: CPT | Performed by: INTERNAL MEDICINE

## 2025-07-29 PROCEDURE — 4010F ACE/ARB THERAPY RXD/TAKEN: CPT | Performed by: INTERNAL MEDICINE

## 2025-07-29 NOTE — PROGRESS NOTES
Chief Complaint: Follow up CKD    Still pains around   Losing weight with ozempic  Denies nausea, vomiting, chest pain, dyspnea  No urinary symptoms  Still living in nursing facility    NAD  Sclera AI s inj  MMM, no sores  Deferred secondary to COVID  No edema   No tremor  No rash    CKD stage 3b   BP at goal  Proteinuria much improved  PLA2R negative  L kidney hydro followed by urology    Follow up with urology given hydroureter  Labs and follow up in six months  Discussed sLT2 would hold off given hydroureter; will reach out to urology

## 2025-07-31 ENCOUNTER — LAB REQUISITION (OUTPATIENT)
Dept: LAB | Facility: HOSPITAL | Age: 55
End: 2025-07-31
Payer: MEDICARE

## 2025-07-31 DIAGNOSIS — N18.9 CHRONIC KIDNEY DISEASE, UNSPECIFIED: ICD-10-CM

## 2025-07-31 DIAGNOSIS — Z79.01 LONG TERM (CURRENT) USE OF ANTICOAGULANTS: ICD-10-CM

## 2025-07-31 LAB
INR PPP: 2.1 (ref 0.9–1.1)
PROTHROMBIN TIME: 22.9 SECONDS (ref 9.8–12.4)

## 2025-07-31 PROCEDURE — 85610 PROTHROMBIN TIME: CPT | Mod: OUT | Performed by: INTERNAL MEDICINE

## 2025-07-31 PROCEDURE — 36415 COLL VENOUS BLD VENIPUNCTURE: CPT | Mod: OUT | Performed by: INTERNAL MEDICINE

## 2025-08-04 ENCOUNTER — LAB REQUISITION (OUTPATIENT)
Dept: LAB | Facility: HOSPITAL | Age: 55
End: 2025-08-04
Payer: MEDICARE

## 2025-08-04 DIAGNOSIS — Z79.01 LONG TERM (CURRENT) USE OF ANTICOAGULANTS: ICD-10-CM

## 2025-08-04 LAB
INR PPP: 1.1 (ref 0.9–1.1)
PROTHROMBIN TIME: 11.7 SECONDS (ref 9.8–12.4)

## 2025-08-04 PROCEDURE — 36415 COLL VENOUS BLD VENIPUNCTURE: CPT | Mod: OUT | Performed by: INTERNAL MEDICINE

## 2025-08-04 PROCEDURE — 85610 PROTHROMBIN TIME: CPT | Mod: OUT | Performed by: INTERNAL MEDICINE

## 2025-08-07 ENCOUNTER — LAB REQUISITION (OUTPATIENT)
Dept: LAB | Facility: HOSPITAL | Age: 55
End: 2025-08-07
Payer: MEDICARE

## 2025-08-07 DIAGNOSIS — N18.9 CHRONIC KIDNEY DISEASE, UNSPECIFIED: ICD-10-CM

## 2025-08-07 LAB
INR PPP: 1.2 (ref 0.9–1.1)
PROTHROMBIN TIME: 12.9 SECONDS (ref 9.8–12.4)

## 2025-08-07 PROCEDURE — 85610 PROTHROMBIN TIME: CPT | Mod: OUT | Performed by: INTERNAL MEDICINE

## 2025-08-07 PROCEDURE — 36415 COLL VENOUS BLD VENIPUNCTURE: CPT | Mod: OUT | Performed by: INTERNAL MEDICINE

## 2025-08-11 ENCOUNTER — LAB REQUISITION (OUTPATIENT)
Dept: LAB | Facility: HOSPITAL | Age: 55
End: 2025-08-11
Payer: MEDICARE

## 2025-08-11 ENCOUNTER — HOSPITAL ENCOUNTER (OUTPATIENT)
Dept: DIALYSIS | Facility: HOSPITAL | Age: 55
Setting detail: DIALYSIS SERIES
Discharge: HOME | End: 2025-08-11
Payer: MEDICARE

## 2025-08-11 VITALS
HEART RATE: 76 BPM | DIASTOLIC BLOOD PRESSURE: 82 MMHG | WEIGHT: 181.44 LBS | SYSTOLIC BLOOD PRESSURE: 123 MMHG | BODY MASS INDEX: 35.43 KG/M2

## 2025-08-11 DIAGNOSIS — D63.1 ANEMIA DUE TO STAGE 4 CHRONIC KIDNEY DISEASE: Primary | ICD-10-CM

## 2025-08-11 DIAGNOSIS — N18.4 CHRONIC KIDNEY DISEASE, STAGE 4 (SEVERE) (MULTI): ICD-10-CM

## 2025-08-11 DIAGNOSIS — E61.1 IRON DEFICIENCY: ICD-10-CM

## 2025-08-11 DIAGNOSIS — Z79.01 LONG TERM (CURRENT) USE OF ANTICOAGULANTS: ICD-10-CM

## 2025-08-11 DIAGNOSIS — N18.4 ANEMIA DUE TO STAGE 4 CHRONIC KIDNEY DISEASE: Primary | ICD-10-CM

## 2025-08-11 DIAGNOSIS — N18.9 CHRONIC KIDNEY DISEASE, UNSPECIFIED: ICD-10-CM

## 2025-08-11 LAB
ERYTHROCYTE [DISTWIDTH] IN BLOOD BY AUTOMATED COUNT: 14.7 % (ref 11.5–14.5)
FERRITIN SERPL-MCNC: 449 NG/ML (ref 8–150)
HCT VFR BLD AUTO: 34.4 % (ref 36–46)
HGB BLD-MCNC: 10.7 G/DL (ref 12–16)
INR PPP: 1.7 (ref 0.9–1.1)
IRON SATN MFR SERPL: 22 % (ref 25–45)
IRON SERPL-MCNC: 56 UG/DL (ref 35–150)
MCH RBC QN AUTO: 29.8 PG (ref 26–34)
MCHC RBC AUTO-ENTMCNC: 31.1 G/DL (ref 32–36)
MCV RBC AUTO: 96 FL (ref 80–100)
NRBC BLD-RTO: 0 /100 WBCS (ref 0–0)
PLATELET # BLD AUTO: 153 X10*3/UL (ref 150–450)
PROTHROMBIN TIME: 18.6 SECONDS (ref 9.8–12.4)
RBC # BLD AUTO: 3.59 X10*6/UL (ref 4–5.2)
TIBC SERPL-MCNC: 250 UG/DL (ref 240–445)
UIBC SERPL-MCNC: 194 UG/DL (ref 110–370)
WBC # BLD AUTO: 7.3 X10*3/UL (ref 4.4–11.3)

## 2025-08-11 PROCEDURE — 36000 PLACE NEEDLE IN VEIN: CPT

## 2025-08-11 PROCEDURE — 96375 TX/PRO/DX INJ NEW DRUG ADDON: CPT

## 2025-08-11 PROCEDURE — 36415 COLL VENOUS BLD VENIPUNCTURE: CPT | Mod: OUT | Performed by: INTERNAL MEDICINE

## 2025-08-11 PROCEDURE — 2500000004 HC RX 250 GENERAL PHARMACY W/ HCPCS (ALT 636 FOR OP/ED): Performed by: NURSE PRACTITIONER

## 2025-08-11 PROCEDURE — 85027 COMPLETE CBC AUTOMATED: CPT | Performed by: INTERNAL MEDICINE

## 2025-08-11 PROCEDURE — 36415 COLL VENOUS BLD VENIPUNCTURE: CPT | Performed by: INTERNAL MEDICINE

## 2025-08-11 PROCEDURE — 83540 ASSAY OF IRON: CPT | Performed by: NURSE PRACTITIONER

## 2025-08-11 PROCEDURE — 36415 COLL VENOUS BLD VENIPUNCTURE: CPT

## 2025-08-11 PROCEDURE — 85610 PROTHROMBIN TIME: CPT | Mod: OUT | Performed by: INTERNAL MEDICINE

## 2025-08-11 PROCEDURE — 82728 ASSAY OF FERRITIN: CPT | Performed by: NURSE PRACTITIONER

## 2025-08-11 PROCEDURE — 96374 THER/PROPH/DIAG INJ IV PUSH: CPT

## 2025-08-11 RX ORDER — FAMOTIDINE 10 MG/ML
20 INJECTION, SOLUTION INTRAVENOUS ONCE AS NEEDED
Status: DISCONTINUED | OUTPATIENT
Start: 2025-08-11 | End: 2025-08-12 | Stop reason: HOSPADM

## 2025-08-11 RX ORDER — ALBUTEROL SULFATE 0.83 MG/ML
3 SOLUTION RESPIRATORY (INHALATION) AS NEEDED
OUTPATIENT
Start: 2025-08-13

## 2025-08-11 RX ORDER — EPINEPHRINE 1 MG/ML
0.3 INJECTION INTRAMUSCULAR; INTRAVENOUS; SUBCUTANEOUS EVERY 5 MIN PRN
Status: DISCONTINUED | OUTPATIENT
Start: 2025-08-11 | End: 2025-08-12 | Stop reason: HOSPADM

## 2025-08-11 RX ORDER — FAMOTIDINE 10 MG/ML
20 INJECTION, SOLUTION INTRAVENOUS ONCE AS NEEDED
OUTPATIENT
Start: 2025-08-13

## 2025-08-11 RX ORDER — EPINEPHRINE 0.3 MG/.3ML
0.3 INJECTION SUBCUTANEOUS EVERY 5 MIN PRN
OUTPATIENT
Start: 2025-08-13

## 2025-08-11 RX ORDER — EPINEPHRINE 1 MG/ML
0.3 INJECTION, SOLUTION, CONCENTRATE INTRAVENOUS EVERY 5 MIN PRN
OUTPATIENT
Start: 2025-08-13

## 2025-08-11 RX ORDER — ALBUTEROL SULFATE 0.83 MG/ML
3 SOLUTION RESPIRATORY (INHALATION) AS NEEDED
Status: DISCONTINUED | OUTPATIENT
Start: 2025-08-11 | End: 2025-08-12 | Stop reason: HOSPADM

## 2025-08-11 RX ADMIN — IRON SUCROSE 100 MG: 20 INJECTION, SOLUTION INTRAVENOUS at 11:21

## 2025-08-11 RX ADMIN — DARBEPOETIN ALFA 80 MCG: 100 INJECTION, SOLUTION INTRAVENOUS; SUBCUTANEOUS at 11:21

## 2025-08-11 ASSESSMENT — PAIN SCALES - GENERAL: PAINLEVEL_OUTOF10: 0-NO PAIN

## 2025-08-14 ENCOUNTER — LAB REQUISITION (OUTPATIENT)
Dept: LAB | Facility: HOSPITAL | Age: 55
End: 2025-08-14
Payer: MEDICARE

## 2025-08-14 DIAGNOSIS — Z79.01 LONG TERM (CURRENT) USE OF ANTICOAGULANTS: ICD-10-CM

## 2025-08-14 LAB
INR PPP: 1.4 (ref 0.9–1.1)
PROTHROMBIN TIME: 15.9 SECONDS (ref 9.8–12.4)

## 2025-08-14 PROCEDURE — 85610 PROTHROMBIN TIME: CPT | Mod: OUT | Performed by: INTERNAL MEDICINE

## 2025-08-14 PROCEDURE — 36415 COLL VENOUS BLD VENIPUNCTURE: CPT | Mod: OUT | Performed by: INTERNAL MEDICINE

## 2025-08-18 ENCOUNTER — LAB REQUISITION (OUTPATIENT)
Dept: LAB | Facility: HOSPITAL | Age: 55
End: 2025-08-18
Payer: MEDICARE

## 2025-08-18 DIAGNOSIS — Z79.01 LONG TERM (CURRENT) USE OF ANTICOAGULANTS: ICD-10-CM

## 2025-08-18 LAB
INR PPP: 2.2 (ref 0.9–1.1)
PROTHROMBIN TIME: 24.4 SECONDS (ref 9.8–12.4)

## 2025-08-18 PROCEDURE — 36415 COLL VENOUS BLD VENIPUNCTURE: CPT | Mod: OUT | Performed by: INTERNAL MEDICINE

## 2025-08-18 PROCEDURE — 85610 PROTHROMBIN TIME: CPT | Mod: OUT | Performed by: INTERNAL MEDICINE

## 2025-08-21 ENCOUNTER — LAB REQUISITION (OUTPATIENT)
Dept: LAB | Facility: HOSPITAL | Age: 55
End: 2025-08-21
Payer: MEDICARE

## 2025-08-21 DIAGNOSIS — N18.9 CHRONIC KIDNEY DISEASE, UNSPECIFIED: ICD-10-CM

## 2025-08-21 LAB
INR PPP: 2.6 (ref 0.9–1.1)
PROTHROMBIN TIME: 28.7 SECONDS (ref 9.8–12.4)

## 2025-08-21 PROCEDURE — 85610 PROTHROMBIN TIME: CPT | Mod: OUT | Performed by: INTERNAL MEDICINE

## 2025-08-21 PROCEDURE — 36415 COLL VENOUS BLD VENIPUNCTURE: CPT | Mod: OUT | Performed by: INTERNAL MEDICINE

## 2025-08-25 ENCOUNTER — LAB REQUISITION (OUTPATIENT)
Dept: LAB | Facility: HOSPITAL | Age: 55
End: 2025-08-25
Payer: MEDICARE

## 2025-08-25 DIAGNOSIS — N18.9 CHRONIC KIDNEY DISEASE, UNSPECIFIED: ICD-10-CM

## 2025-08-25 LAB
INR PPP: 3.3 (ref 0.9–1.1)
PROTHROMBIN TIME: 37.1 SECONDS (ref 9.8–12.4)

## 2025-08-25 PROCEDURE — 36415 COLL VENOUS BLD VENIPUNCTURE: CPT | Mod: OUT | Performed by: INTERNAL MEDICINE

## 2025-08-25 PROCEDURE — 85610 PROTHROMBIN TIME: CPT | Mod: OUT | Performed by: INTERNAL MEDICINE

## 2025-08-28 ENCOUNTER — LAB REQUISITION (OUTPATIENT)
Dept: LAB | Facility: HOSPITAL | Age: 55
End: 2025-08-28
Payer: MEDICARE

## 2025-08-28 DIAGNOSIS — Z79.01 LONG TERM (CURRENT) USE OF ANTICOAGULANTS: ICD-10-CM

## 2025-08-28 DIAGNOSIS — N18.9 CHRONIC KIDNEY DISEASE, UNSPECIFIED: ICD-10-CM

## 2025-08-28 LAB
INR PPP: 1.4 (ref 0.9–1.1)
PROTHROMBIN TIME: 15 SECONDS (ref 9.8–12.4)

## 2025-08-28 PROCEDURE — 36415 COLL VENOUS BLD VENIPUNCTURE: CPT | Mod: OUT | Performed by: INTERNAL MEDICINE

## 2025-08-28 PROCEDURE — 85610 PROTHROMBIN TIME: CPT | Mod: OUT | Performed by: INTERNAL MEDICINE

## 2026-02-03 ENCOUNTER — APPOINTMENT (OUTPATIENT)
Dept: NEPHROLOGY | Facility: CLINIC | Age: 56
End: 2026-02-03
Payer: MEDICARE

## 2026-06-01 ENCOUNTER — APPOINTMENT (OUTPATIENT)
Dept: UROLOGY | Facility: CLINIC | Age: 56
End: 2026-06-01
Payer: MEDICARE